# Patient Record
Sex: MALE | Race: WHITE | Employment: FULL TIME | URBAN - METROPOLITAN AREA
[De-identification: names, ages, dates, MRNs, and addresses within clinical notes are randomized per-mention and may not be internally consistent; named-entity substitution may affect disease eponyms.]

---

## 2019-08-11 ENCOUNTER — IP HISTORICAL/CONVERTED ENCOUNTER (OUTPATIENT)
Dept: OTHER | Age: 59
End: 2019-08-11

## 2020-11-02 ENCOUNTER — APPOINTMENT (OUTPATIENT)
Dept: NON INVASIVE DIAGNOSTICS | Age: 60
DRG: 201 | End: 2020-11-02
Attending: HOSPITALIST
Payer: MEDICAID

## 2020-11-02 ENCOUNTER — HOSPITAL ENCOUNTER (INPATIENT)
Age: 60
LOS: 3 days | Discharge: HOME | DRG: 201 | End: 2020-11-05
Attending: EMERGENCY MEDICINE | Admitting: HOSPITALIST
Payer: MEDICAID

## 2020-11-02 ENCOUNTER — APPOINTMENT (OUTPATIENT)
Dept: GENERAL RADIOLOGY | Age: 60
DRG: 201 | End: 2020-11-02
Attending: EMERGENCY MEDICINE
Payer: MEDICAID

## 2020-11-02 DIAGNOSIS — I48.91 ATRIAL FIBRILLATION WITH RVR (HCC): Primary | ICD-10-CM

## 2020-11-02 PROBLEM — Z72.0 TOBACCO ABUSE: Status: ACTIVE | Noted: 2020-11-02

## 2020-11-02 LAB
ALBUMIN SERPL-MCNC: 4 G/DL (ref 3.5–5)
ALBUMIN/GLOB SERPL: 1.1 {RATIO} (ref 1.1–2.2)
ALP SERPL-CCNC: 94 U/L (ref 45–117)
ALT SERPL-CCNC: 18 U/L (ref 12–78)
ANION GAP SERPL CALC-SCNC: 4 MMOL/L (ref 5–15)
APTT PPP: 31.1 SEC (ref 23–35.7)
AST SERPL W P-5'-P-CCNC: 15 U/L (ref 15–37)
ATRIAL RATE: 144 BPM
BASOPHILS # BLD: 0 K/UL (ref 0–0.1)
BASOPHILS NFR BLD: 0 % (ref 0–1)
BILIRUB SERPL-MCNC: 0.8 MG/DL (ref 0.2–1)
BUN SERPL-MCNC: 13 MG/DL (ref 6–20)
BUN/CREAT SERPL: 11 (ref 12–20)
CA-I BLD-MCNC: 9.3 MG/DL (ref 8.5–10.1)
CALCULATED R AXIS, ECG10: 80 DEGREES
CALCULATED T AXIS, ECG11: 41 DEGREES
CHLORIDE SERPL-SCNC: 106 MMOL/L (ref 97–108)
CK SERPL-CCNC: 155 NG/ML (ref 39–308)
CO2 SERPL-SCNC: 27 MMOL/L (ref 21–32)
CREAT SERPL-MCNC: 1.2 MG/DL (ref 0.7–1.3)
DIAGNOSIS, 93000: NORMAL
DIFFERENTIAL METHOD BLD: NORMAL
ECHO AV PEAK GRADIENT: 6 MMHG
ECHO EST RA PRESSURE: 8 MMHG
ECHO LV E' SEPTAL VELOCITY: 12.1 CM/S
ECHO LV EDV A2C: 120 CM3
ECHO LV EDV A4C: 135 CM3
ECHO LV EJECTION FRACTION A2C: 22 %
ECHO LV EJECTION FRACTION BIPLANE: 51.7 % (ref 55–100)
ECHO LV ESV A2C: 58 CM3
ECHO LV ESV A4C: 83 CM3
ECHO LV INTERNAL DIMENSION DIASTOLIC: 4.93 CM (ref 4.2–5.9)
ECHO LV INTERNAL DIMENSION SYSTOLIC: 3.87 CM
ECHO LV IVSD: 0.92 CM (ref 0.6–1)
ECHO LV MASS 2D: 156.8 G (ref 88–224)
ECHO LV MASS INDEX 2D: 74.4 G/M2 (ref 49–115)
ECHO LV POSTERIOR WALL DIASTOLIC: 0.9 CM (ref 0.6–1)
ECHO LVOT PEAK GRADIENT: 2 MMHG
ECHO MV A VELOCITY: 36.4 CM/S
ECHO MV E VELOCITY: 95 CM/S
ECHO MV E/A RATIO: 2.61
ECHO MV E/E' SEPTAL: 7.85
ECHO PV PEAK INSTANTANEOUS GRADIENT SYSTOLIC: 3 MMHG
ECHO PV PEAK INSTANTANEOUS GRADIENT SYSTOLIC: 6 MMHG
ECHO PV REGURGITANT MAX VELOCITY: 118 CM/S
ECHO PV REGURGITANT MAX VELOCITY: 127 CM/S
ECHO PV REGURGITANT MAX VELOCITY: 454 CM/S
ECHO PV REGURGITANT MAX VELOCITY: 70.3 CM/S
ECHO PV REGURGITANT MAX VELOCITY: 90.5 CM/S
ECHO RIGHT VENTRICULAR SYSTOLIC PRESSURE (RVSP): 35 MMHG
ECHO RV INTERNAL DIMENSION: 2.36 CM
ECHO TV MAX VELOCITY: 262 CM/S
ECHO TV REGURGITANT PEAK GRADIENT: 27 MMHG
EOSINOPHIL # BLD: 0.2 K/UL (ref 0–0.4)
EOSINOPHIL NFR BLD: 3 % (ref 0–7)
ERYTHROCYTE [DISTWIDTH] IN BLOOD BY AUTOMATED COUNT: 14.2 % (ref 11.5–14.5)
GLOBULIN SER CALC-MCNC: 3.8 G/DL (ref 2–4)
GLUCOSE SERPL-MCNC: 119 MG/DL (ref 65–100)
HCT VFR BLD AUTO: 48.5 % (ref 36.6–50.3)
HGB BLD-MCNC: 16.5 G/DL (ref 12.1–17)
IMM GRANULOCYTES # BLD AUTO: 0 K/UL (ref 0–0.04)
IMM GRANULOCYTES NFR BLD AUTO: 0 % (ref 0–0.5)
INR PPP: 1.1 (ref 0.9–1.1)
LYMPHOCYTES # BLD: 2.6 K/UL (ref 0.8–3.5)
LYMPHOCYTES NFR BLD: 29 % (ref 12–49)
MCH RBC QN AUTO: 31 PG (ref 26–34)
MCHC RBC AUTO-ENTMCNC: 34 G/DL (ref 30–36.5)
MCV RBC AUTO: 91 FL (ref 80–99)
MONOCYTES # BLD: 0.5 K/UL (ref 0–1)
MONOCYTES NFR BLD: 6 % (ref 5–13)
NEUTS SEG # BLD: 5.6 K/UL (ref 1.8–8)
NEUTS SEG NFR BLD: 62 % (ref 32–75)
PLATELET # BLD AUTO: 296 K/UL (ref 150–400)
PMV BLD AUTO: 10.2 FL (ref 8.9–12.9)
POTASSIUM SERPL-SCNC: 3.6 MMOL/L (ref 3.5–5.1)
PROT SERPL-MCNC: 7.8 G/DL (ref 6.4–8.2)
PROTHROMBIN TIME: 13.9 SEC (ref 11.9–14.7)
Q-T INTERVAL, ECG07: 318 MS
QRS DURATION, ECG06: 92 MS
QTC CALCULATION (BEZET), ECG08: 483 MS
RBC # BLD AUTO: 5.33 M/UL (ref 4.1–5.7)
SODIUM SERPL-SCNC: 137 MMOL/L (ref 136–145)
THERAPEUTIC RANGE,PTTT: NORMAL SEC (ref 68–109)
TROPONIN I SERPL-MCNC: <0.05 NG/ML
VENTRICULAR RATE, ECG03: 139 BPM
WBC # BLD AUTO: 8.9 K/UL (ref 4.1–11.1)

## 2020-11-02 PROCEDURE — 74011250637 HC RX REV CODE- 250/637: Performed by: HOSPITALIST

## 2020-11-02 PROCEDURE — 65270000029 HC RM PRIVATE

## 2020-11-02 PROCEDURE — 85025 COMPLETE CBC W/AUTO DIFF WBC: CPT

## 2020-11-02 PROCEDURE — 93306 TTE W/DOPPLER COMPLETE: CPT

## 2020-11-02 PROCEDURE — 80053 COMPREHEN METABOLIC PANEL: CPT

## 2020-11-02 PROCEDURE — 82550 ASSAY OF CK (CPK): CPT

## 2020-11-02 PROCEDURE — 96374 THER/PROPH/DIAG INJ IV PUSH: CPT

## 2020-11-02 PROCEDURE — 84484 ASSAY OF TROPONIN QUANT: CPT

## 2020-11-02 PROCEDURE — 93005 ELECTROCARDIOGRAM TRACING: CPT

## 2020-11-02 PROCEDURE — 85610 PROTHROMBIN TIME: CPT

## 2020-11-02 PROCEDURE — 85730 THROMBOPLASTIN TIME PARTIAL: CPT

## 2020-11-02 PROCEDURE — 36415 COLL VENOUS BLD VENIPUNCTURE: CPT

## 2020-11-02 PROCEDURE — 74011000250 HC RX REV CODE- 250: Performed by: EMERGENCY MEDICINE

## 2020-11-02 PROCEDURE — 99285 EMERGENCY DEPT VISIT HI MDM: CPT

## 2020-11-02 PROCEDURE — 71045 X-RAY EXAM CHEST 1 VIEW: CPT

## 2020-11-02 RX ORDER — SODIUM CHLORIDE 0.9 % (FLUSH) 0.9 %
5-40 SYRINGE (ML) INJECTION AS NEEDED
Status: DISCONTINUED | OUTPATIENT
Start: 2020-11-02 | End: 2020-11-05 | Stop reason: HOSPADM

## 2020-11-02 RX ORDER — POLYETHYLENE GLYCOL 3350 17 G/17G
17 POWDER, FOR SOLUTION ORAL DAILY PRN
Status: DISCONTINUED | OUTPATIENT
Start: 2020-11-02 | End: 2020-11-05 | Stop reason: HOSPADM

## 2020-11-02 RX ORDER — PROMETHAZINE HYDROCHLORIDE 25 MG/1
12.5 TABLET ORAL
Status: DISCONTINUED | OUTPATIENT
Start: 2020-11-02 | End: 2020-11-05 | Stop reason: HOSPADM

## 2020-11-02 RX ORDER — DILTIAZEM HCL/D5W 125 MG/125
5 PLASTIC BAG, INJECTION (ML) INTRAVENOUS CONTINUOUS
Status: DISCONTINUED | OUTPATIENT
Start: 2020-11-02 | End: 2020-11-04

## 2020-11-02 RX ORDER — FAMOTIDINE 20 MG/1
20 TABLET, FILM COATED ORAL 2 TIMES DAILY
Status: DISCONTINUED | OUTPATIENT
Start: 2020-11-02 | End: 2020-11-05 | Stop reason: HOSPADM

## 2020-11-02 RX ORDER — IBUPROFEN 200 MG
1 TABLET ORAL DAILY
Status: DISCONTINUED | OUTPATIENT
Start: 2020-11-02 | End: 2020-11-05 | Stop reason: HOSPADM

## 2020-11-02 RX ORDER — SODIUM CHLORIDE 0.9 % (FLUSH) 0.9 %
5-40 SYRINGE (ML) INJECTION EVERY 8 HOURS
Status: DISCONTINUED | OUTPATIENT
Start: 2020-11-02 | End: 2020-11-05 | Stop reason: HOSPADM

## 2020-11-02 RX ORDER — ACETAMINOPHEN 325 MG/1
650 TABLET ORAL
Status: DISCONTINUED | OUTPATIENT
Start: 2020-11-02 | End: 2020-11-05 | Stop reason: HOSPADM

## 2020-11-02 RX ORDER — ONDANSETRON 2 MG/ML
4 INJECTION INTRAMUSCULAR; INTRAVENOUS
Status: DISCONTINUED | OUTPATIENT
Start: 2020-11-02 | End: 2020-11-05 | Stop reason: HOSPADM

## 2020-11-02 RX ORDER — ACETAMINOPHEN 650 MG/1
650 SUPPOSITORY RECTAL
Status: DISCONTINUED | OUTPATIENT
Start: 2020-11-02 | End: 2020-11-05 | Stop reason: HOSPADM

## 2020-11-02 RX ORDER — DILTIAZEM HYDROCHLORIDE 5 MG/ML
20 INJECTION INTRAVENOUS
Status: COMPLETED | OUTPATIENT
Start: 2020-11-02 | End: 2020-11-02

## 2020-11-02 RX ORDER — DILTIAZEM HCL/D5W 125 MG/125
0-15 PLASTIC BAG, INJECTION (ML) INTRAVENOUS
Status: DISCONTINUED | OUTPATIENT
Start: 2020-11-02 | End: 2020-11-02

## 2020-11-02 RX ADMIN — APIXABAN 5 MG: 5 TABLET, FILM COATED ORAL at 21:45

## 2020-11-02 RX ADMIN — Medication 10 ML: at 17:31

## 2020-11-02 RX ADMIN — DILTIAZEM HYDROCHLORIDE 20 MG: 5 INJECTION INTRAVENOUS at 07:50

## 2020-11-02 RX ADMIN — APIXABAN 5 MG: 5 TABLET, FILM COATED ORAL at 11:30

## 2020-11-02 RX ADMIN — Medication 10 ML: at 21:45

## 2020-11-02 RX ADMIN — Medication 10 MG/HR: at 07:49

## 2020-11-02 RX ADMIN — FAMOTIDINE 20 MG: 20 TABLET ORAL at 11:30

## 2020-11-02 RX ADMIN — FAMOTIDINE 20 MG: 20 TABLET ORAL at 21:45

## 2020-11-02 NOTE — CONSULTS
Consult    NAME: Deejay Lay   :  1960   MRN:  [id number]     Date/Time:  2020 2:53 PM    Patient PCP: Christina Johnston MD  ________________________________________________________________________    This is an inpatient cardiology consultation requested by Dr. Milad Chairez for atrial fibrillation with rapid ventricular response. Assessment: This is a 23-year-old  man with a history of hypertension, paroxysmal atrial fibrillation and nicotine addiction who now presents with atrial fibrillation with rapid ventricular response. Patient in past has refused to take Coumadin and Xarelto for anticoagulation and has been noncompliant with medications and physician  follow-ups. Patient's cardiac enzymes in the hospital have been negative for myocardial injury. Plan:     1. Agree with IV Cardizem drip for rate control of atrial fibrillation along with Eliquis for anticoagulation. 2. Agree with echocardiogram for LV function wall motion abnormalities. 3.   Lexiscan stress Cardiolite scan to assess myocardial ischemia due to patient cardiac risk factors. This however can be done as an outpatient. 4.  I thank you for allowing me to see this patient. []        High complexity decision making was performed        Subjective:   CHIEF COMPLAINT: Atrial fibrillation with rapid ventricle response    HISTORY OF PRESENT ILLNESS:     This is a 23-year-old  man with history of hypertension, paroxysmal atrial fibrillation, noncompliance with medications and physician follow-up and nicotine addiction who a week ago post taking flu shot became febrile with feeling of palpitations and heart racing which resolved on its own. Patient however last night again had symptoms of heart racing with increasing shortness of breath and lightheadedness which persisted, making him come to emergency room.   In the emergency room patient was noted in atrial fibrillation with rapid ventricular response with a heart rate up to 140-150 range. Patient was started on IV Cardizem drip and now on IV Cardizem drip of 5 mg an hour patient's heart rate is less than 100 pulses per minute. Patient feels much better. Patient on close questioning denies any complaints of chest pain, orthopnea, PND or lower extremity edema. Past Medical History:   Diagnosis Date    A[facility]fib ([facility]     Bronchitis     Hypertension       History reviewed. No pertinent surgical history. No Known Allergies   Meds:  See below  Social History     Tobacco Use    Smoking status: Current Every Day Smoker     Packs/day: 1.00    Smokeless tobacco: Never Used   Substance Use Topics    Alcohol use: Yes     Comment: occasionally      History reviewed. No pertinent family history. REVIEW OF SYSTEMS:     []         Unable to obtain  ROS due to ---   [x]         Total of 12 systems reviewed as follows:    Constitutional: negative fever, negative chills, negative weight loss  Eyes:   negative visual changes  ENT:   negative sore throat, tongue or lip swelling  Respiratory:  Positive for smokers chronic cough, negative for wheezing   Cards: As per history of present illness  GI:   negative for nausea, vomiting, diarrhea, and abdominal pain  Genitourinary: negative for frequency, dysuria  Integument:  negative for rash   Hematologic:  negative for easy bruising and gum/nose bleeding  Musculoskel: negative for myalgias,  back pain  Neurological:  negative for headaches, dizziness, vertigo, weakness  Behavl/Psych: negative for feelings of anxiety, depression     Pertinent Positives include :    Objective:      Physical Exam:    Last 24hrs VS reviewed since prior progress note.  Most recent are:    Visit Vitals  BP (!) 135/93   Pulse 84   Temp 97.9 °F (36.6 °C)   Resp 20   Ht 5' 10.98\" (1.803 m)   Wt 90.7 kg (199 lb 15.3 oz)   SpO2 96%   BMI 27.90 kg/m²     No intake or output data in the 24 hours ending 11/02/20 1453     General Appearance: Well developed, well nourished, alert & oriented x 3,    no acute distress. Ears/Nose/Mouth/Throat: Pupils equal and round, Hearing grossly normal.  Neck: Supple. JVP within normal limits. Carotids good upstrokes, with no bruit. Chest: Lungs clear to auscultation bilaterally. Cardiovascular: Irregularly irregular, S1S2 normal, no murmur, rubs, gallops. No venous distention absent. Carotid bruits absent. Abdomen: Soft, non-tender, bowel sounds are active. No organomegaly. Extremities: No edema bilaterally. Femoral pulses +2, Distal Pulses +1. Skin: Warm and dry. Neuro: CN II-XII grossly intact, Strength and sensation grossly intact. []         Post-cath site without hematoma, bruit, tenderness, or thrill. Distal pulses intact. Data:      Telemetry:    EKG:  []  No new EKG for review. XR CHEST PORT   Final Result   Impression: No acute pulmonary process.                 Prior to Admission medications    Not on File       Recent Results (from the past 24 hour(s))   EKG, 12 LEAD, INITIAL    Collection Time: 11/02/20  6:52 AM   Result Value Ref Range    Ventricular Rate 139 BPM    Atrial Rate 144 BPM    QRS Duration 92 ms    Q-T Interval 318 ms    QTC Calculation (Bezet) 483 ms    Calculated R Axis 80 degrees    Calculated T Axis 41 degrees    Diagnosis       Atrial fibrillation with rapid ventricular response  Cannot rule out Anterior infarct , age undetermined  Abnormal ECG  When compared with ECG of 13-AUG-2019 11:33,  Atrial fibrillation has replaced Sinus rhythm  Confirmed by Benito Cantu MD, Ernesto Milligan ([id number]) on 11/2/2020 8:13:37 AM     CBC WITH AUTOMATED DIFF    Collection Time: 11/02/20  7:00 AM   Result Value Ref Range    WBC 8.9 4.1 - 11.1 K/uL    RBC 5.33 4.10 - 5.70 M/uL    HGB 16.5 12.1 - 17.0 g/dL    HCT 48.5 36.6 - 50.3 %    MCV 91.0 80.0 - 99.0 FL    MCH 31.0 26.0 - 34.0 PG    MCHC 34.0 30.0 - 36.5 g/dL    RDW 14.2 11.5 - 14.5 %    PLATELET 893 458 - 115 K/uL    MPV 10.2 8.9 - 12.9 FL    NEUTROPHILS 62 32 - 75 %    LYMPHOCYTES 29 12 - 49 %    MONOCYTES 6 5 - 13 %    EOSINOPHILS 3 0 - 7 %    BASOPHILS 0 0 - 1 %    IMMATURE GRANULOCYTES 0 0.0 - 0.5 %    ABS. NEUTROPHILS 5.6 1.8 - 8.0 K/UL    ABS. LYMPHOCYTES 2.6 0.8 - 3.5 K/UL    ABS. MONOCYTES 0.5 0.0 - 1.0 K/UL    ABS. EOSINOPHILS 0.2 0.0 - 0.4 K/UL    ABS. BASOPHILS 0.0 0.0 - 0.1 K/UL    ABS. IMM. GRANS. 0.0 0.00 - 0.04 K/UL    DF AUTOMATED     METABOLIC PANEL, COMPREHENSIVE    Collection Time: 11/02/20  7:00 AM   Result Value Ref Range    Sodium 137 136 - 145 mmol/L    Potassium 3.6 3.5 - 5.1 mmol/L    Chloride 106 97 - 108 mmol/L    CO2 27 21 - 32 mmol/L    Anion gap 4 (L) 5 - 15 mmol/L    Glucose 119 (H) 65 - 100 mg/dL    BUN 13 6 - 20 mg/dL    Creatinine 1.20 0.70 - 1.30 mg/dL    BUN/Creatinine ratio 11 (L) 12 - 20      GFR est AA >60 >60 ml/min/1.73m2    GFR est non-AA >60 >60 ml/min/1.73m2    Calcium 9.3 8.5 - 10.1 mg/dL    Bilirubin, total 0.8 0.2 - 1.0 mg/dL    AST (SGOT) 15 15 - 37 U/L    ALT (SGPT) 18 12 - 78 U/L    Alk. phosphatase 94 45 - 117 U/L    Protein, total 7.8 6.4 - 8.2 g/dL    Albumin 4.0 3.5 - 5.0 g/dL    Globulin 3.8 2.0 - 4.0 g/dL    A-G Ratio 1.1 1.1 - 2.2     TROPONIN I    Collection Time: 11/02/20  7:00 AM   Result Value Ref Range    Troponin-I, Qt. <0.05 <0.05 ng/mL   PROTHROMBIN TIME + INR    Collection Time: 11/02/20  7:25 AM   Result Value Ref Range    Prothrombin time 13.9 11.9 - 14.7 sec    INR 1.1 0.9 - 1.1     PTT    Collection Time: 11/02/20  7:25 AM   Result Value Ref Range    aPTT 31.1 23.0 - 35.7 sec    aPTT, therapeutic range   68 - 109 sec        Echo Results  (Last 48 hours)    None          Patient's  EKG and laboratory data were individually reviewed by me. Please send a copy of this dictation to above referring physician. Elina Valles MD    This dictation was done by Dragon computer voice recognition software. Occasionally grammatical, syntax and other interpretive errors escape final proof reading.  Please feel free to call me for any clarification.

## 2020-11-02 NOTE — H&P
History and Physical    Patient: Aileen Sims MRN: [id number]  SSN: [id number]    YOB: 1960  Age: 63 y.o. Sex: male      Subjective:      Chief Complaint: Palpitation and shortness of breath    HPI: Aileen Sims is a 63 y.o. male who has a history of A. Fibrillation came to ER with a complaint of shortness of breath and palpitation. Patient was found to have atrial fibrillation with rapid ventricular rate and was started on Cardizem drip. Patient states that he was diagnosed with atrial fibrillation about a year ago and was started on Xarelto and rate control medications. Patient could not afford Xarelto and stopped taking it after 3 months. He did not wanted to get anticoagulation with Coumadin as well if he had to get his blood work done for monitoring INR. Following which he completely stopped following with any cardiologist.  Today patient came to the ER with his worsening shortness of breath for last couple days and last night he started having palpitations and shortness of breath was much worse which prompted him to come to the hospital.  He denies any chest pain. Denies any fever rigors or chills. Hospitalist service was requested to admit the patient for further work-up and management. Past Medical History:   Diagnosis Date    [facility]     Bronchitis     Hypertension      History reviewed. No pertinent surgical history. History reviewed. No pertinent family history.   Social History     Tobacco Use    Smoking status: Current Every Day Smoker     Packs/day: 1.00    Smokeless tobacco: Never Used   Substance Use Topics    Alcohol use: Yes     Comment: occasionally      Prior to Admission medications    Not on File        No Known Allergies    Review of Systems:  Constitutional: No fevers, No chills, No fatigue, No weakness  Eyes: No visual disturbance  Ears, Nose, Mouth, Throat, and Face: No nasal congestion, No sore throat  Respiratory: No cough, No sputum, No wheezing,+++ SOB  Cardiovascular: No chest pain, No lower extremity edema, +++ Palpitations   Gastrointestinal: No nausea, No vomiting, No diarrhea, No constipation, No abdominal pain  Genitourinary: No frequency, No dysuria, No hematuria  Integument/Breast: No rash, No skin lesion(s), No dryness  Musculoskeletal: No arthralgias, No neck pain, No back pain  Neurological: No headaches, No dizziness, No confusion,  No seizures  Behavioral/Psychiatric: No anxiety, No depression      Objective:     Vitals:    11/02/20 0715 11/02/20 0758 11/02/20 0800 11/02/20 0918   BP:  (!) 149/103  114/85   Pulse:  (!) 115  87   Resp:  22  20   Temp:  97.9 °F (36.6 °C)     SpO2: 97% 98% 98% 96%   Weight:       Height:            Physical Exam:  General: Alert and Oriented x 3. Cooperative and friendly. No acute distress. HEAD: Normocephalic, atraumatic. Eye: PERRLA, EOMI. Throat and Neck: normal and no erythema or exudates noted. No mass   Lung: Clear to auscultation bilaterally without wheezes, rhonchi. Good air movement bilaterally. Heart:IRRegular rate and rhythm. NO appreciated murmurs, rubs or gallops. Abdomen: soft, non-tender. Bowel sounds present in all four quadrants. No masses appreciated. Extremities:  able to move all extremities normal, atraumatic  Skin: Clean, dry and intact without appreciated lesions. Neurologic: AOx3. Cranial nerves 2-12 and sensation grossly intact.   Psychiatric: non focal, normal affect, normal thought process    BMP:   Lab Results   Component Value Date/Time    Glucose 119 (H) 11/02/2020 07:00 AM    Sodium 137 11/02/2020 07:00 AM    Potassium 3.6 11/02/2020 07:00 AM    Chloride 106 11/02/2020 07:00 AM    CO2 27 11/02/2020 07:00 AM    BUN 13 11/02/2020 07:00 AM    Creatinine 1.20 11/02/2020 07:00 AM    Calcium 9.3 11/02/2020 07:00 AM     CMP:   Lab Results   Component Value Date/Time    Glucose 119 (H) 11/02/2020 07:00 AM    Sodium 137 11/02/2020 07:00 AM    Potassium 3.6 11/02/2020 07:00 AM    Chloride 106 11/02/2020 07:00 AM    CO2 27 11/02/2020 07:00 AM    BUN 13 11/02/2020 07:00 AM    Creatinine 1.20 11/02/2020 07:00 AM    Calcium 9.3 11/02/2020 07:00 AM    Anion gap 4 (L) 11/02/2020 07:00 AM    BUN/Creatinine ratio 11 (L) 11/02/2020 07:00 AM    Alk. phosphatase 94 11/02/2020 07:00 AM    Protein, total 7.8 11/02/2020 07:00 AM    Albumin 4.0 11/02/2020 07:00 AM    Globulin 3.8 11/02/2020 07:00 AM    A-G Ratio 1.1 11/02/2020 07:00 AM     Cardiac markers: No results found for: CPK, CKND1, HERNAN  ABGs: No results found for: PH, PO2, PCO2, HCO3, BD, BE  Radiology review:   XR CHEST PORT   Final Result   Impression: No acute pulmonary process.                Assessment:     Active Problems:    Atrial fibrillation with RVR ([address] (11/2/2020)      Tobacco abuse (11/2/2020)         Plan:     Admit to telemetry  Continue Cardizem drip  Start Eliquis  Discussed with cardiology- further workup as per cardiology  Check echo    Signed By: Pastor Carlie MD     November 2, 2020

## 2020-11-02 NOTE — LETTER
NOTIFICATION RETURN TO WORK / SCHOOL 
 
11/5/2020 Mr. Kimmy Gómez [address] [facility] [place] [address] To Whom It May Concern: 
 
Kimmy Gómez is currently under the care of [address] He was admitted to [facility] from 11/2/2020 to 11/5/2020. He will return to work/school on: 11/9/2020 If there are questions or concerns please have the patient contact our office. Sincerely, John Rush MD

## 2020-11-02 NOTE — ROUTINE PROCESS
TRANSFER - OUT REPORT: 
 
Verbal report given to Balbina(name) on Cinthia Batista  being transferred to 4(unit) for routine progression of care Report consisted of patients Situation, Background, Assessment and  
Recommendations(SBAR). Information from the following report(s) SBAR and ED Summary was reviewed with the receiving nurse. Lines:  
Peripheral IV 11/02/20 Left Antecubital (Active) Opportunity for questions and clarification was provided. Patient transported with: 
 Monitor Tech

## 2020-11-02 NOTE — ED TRIAGE NOTES
States the last 2 days has been sob worse when he is lying down. States some left arm uncomfortable feeling.  Has not been on bp meds or anticoags in months

## 2020-11-02 NOTE — ED PROVIDER NOTES
EMERGENCY DEPARTMENT HISTORY AND PHYSICAL EXAM        Date: 11/2/2020  Patient Name: Mere Palacios    History of Presenting Illness     Chief Complaint   Patient presents with    Chest Pain    Shortness of Breath       History Provided By: Patient    HPI: Mere Palacios, 63 y.o. male with past medical history of atrial fibrillation hypertension who presents with 2 days of shortness of breath. Symptoms were worse last night. States he has worse dyspnea with lying on his back. He also has worse dyspnea with exertion. He has some discomfort in his left arm but denies any chest pain. He does have history of atrial fibrillation and states he has not been taking his medications for the last 6 months including his anticoagulants and rate control medications. PCP: Jon Kraft MD        Past History     Past Medical History:  Past Medical History:   Diagnosis Date    [facility]     Bronchitis     Hypertension        Past Surgical History:  History reviewed. No pertinent surgical history. Family History:  History reviewed. No pertinent family history. Social History:  Social History     Tobacco Use    Smoking status: Current Every Day Smoker     Packs/day: 1.00    Smokeless tobacco: Never Used   Substance Use Topics    Alcohol use: Yes     Comment: occasionally    Drug use: Never       Allergies:  No Known Allergies    Review of Systems   Review of Systems   Constitutional: Negative for fever. HENT: Negative for congestion. Eyes: Negative for visual disturbance. Respiratory: Positive for shortness of breath. Negative for chest tightness. Cardiovascular: Negative for chest pain. Gastrointestinal: Negative for abdominal pain. Genitourinary: Negative for dysuria. Musculoskeletal: Negative for arthralgias. Skin: Negative for rash. Neurological: Negative for headaches. Physical Exam   General: No acute distressed. Well-nourished. Skin: No rash. Head: Normocephalic. Atraumatic. Eye: No proptosis or conjunctival injections. Respiratory: No apparent respiratory distress. Gastrointestinal: Nondistended. Musculoskeletal: No obvious bony deformities. Psychiatric: Cooperative. Appropriate mood and affect. Diagnostic Study Results     Labs -     Recent Results (from the past 24 hour(s))   EKG, 12 LEAD, INITIAL    Collection Time: 11/02/20  6:52 AM   Result Value Ref Range    Ventricular Rate 139 BPM    Atrial Rate 144 BPM    QRS Duration 92 ms    Q-T Interval 318 ms    QTC Calculation (Bezet) 483 ms    Calculated R Axis 80 degrees    Calculated T Axis 41 degrees    Diagnosis       Atrial fibrillation with rapid ventricular response  Cannot rule out Anterior infarct , age undetermined  Abnormal ECG  When compared with ECG of 13-AUG-2019 11:33,  Atrial fibrillation has replaced Sinus rhythm  Confirmed by Katherine Mullen MD, Angela Brownlee ([id number]) on 11/2/2020 8:13:37 AM     CBC WITH AUTOMATED DIFF    Collection Time: 11/02/20  7:00 AM   Result Value Ref Range    WBC 8.9 4.1 - 11.1 K/uL    RBC 5.33 4.10 - 5.70 M/uL    HGB 16.5 12.1 - 17.0 g/dL    HCT 48.5 36.6 - 50.3 %    MCV 91.0 80.0 - 99.0 FL    MCH 31.0 26.0 - 34.0 PG    MCHC 34.0 30.0 - 36.5 g/dL    RDW 14.2 11.5 - 14.5 %    PLATELET 135 376 - 135 K/uL    MPV 10.2 8.9 - 12.9 FL    NEUTROPHILS 62 32 - 75 %    LYMPHOCYTES 29 12 - 49 %    MONOCYTES 6 5 - 13 %    EOSINOPHILS 3 0 - 7 %    BASOPHILS 0 0 - 1 %    IMMATURE GRANULOCYTES 0 0.0 - 0.5 %    ABS. NEUTROPHILS 5.6 1.8 - 8.0 K/UL    ABS. LYMPHOCYTES 2.6 0.8 - 3.5 K/UL    ABS. MONOCYTES 0.5 0.0 - 1.0 K/UL    ABS. EOSINOPHILS 0.2 0.0 - 0.4 K/UL    ABS. BASOPHILS 0.0 0.0 - 0.1 K/UL    ABS. IMM.  GRANS. 0.0 0.00 - 0.04 K/UL    DF AUTOMATED     METABOLIC PANEL, COMPREHENSIVE    Collection Time: 11/02/20  7:00 AM   Result Value Ref Range    Sodium 137 136 - 145 mmol/L    Potassium 3.6 3.5 - 5.1 mmol/L    Chloride 106 97 - 108 mmol/L    CO2 27 21 - 32 mmol/L    Anion gap 4 (L) 5 - 15 mmol/L Glucose 119 (H) 65 - 100 mg/dL    BUN 13 6 - 20 mg/dL    Creatinine 1.20 0.70 - 1.30 mg/dL    BUN/Creatinine ratio 11 (L) 12 - 20      GFR est AA >60 >60 ml/min/1.73m2    GFR est non-AA >60 >60 ml/min/1.73m2    Calcium 9.3 8.5 - 10.1 mg/dL    Bilirubin, total 0.8 0.2 - 1.0 mg/dL    AST (SGOT) 15 15 - 37 U/L    ALT (SGPT) 18 12 - 78 U/L    Alk. phosphatase 94 45 - 117 U/L    Protein, total 7.8 6.4 - 8.2 g/dL    Albumin 4.0 3.5 - 5.0 g/dL    Globulin 3.8 2.0 - 4.0 g/dL    A-G Ratio 1.1 1.1 - 2.2     TROPONIN I    Collection Time: 11/02/20  7:00 AM   Result Value Ref Range    Troponin-I, Qt. <0.05 <0.05 ng/mL   PROTHROMBIN TIME + INR    Collection Time: 11/02/20  7:25 AM   Result Value Ref Range    Prothrombin time 13.9 11.9 - 14.7 sec    INR 1.1 0.9 - 1.1     PTT    Collection Time: 11/02/20  7:25 AM   Result Value Ref Range    aPTT 31.1 23.0 - 35.7 sec    aPTT, therapeutic range   68 - 109 sec       Radiologic Studies -   XR CHEST PORT   Final Result   Impression: No acute pulmonary process. CT Results  (Last 48 hours)    None        CXR Results  (Last 48 hours)               11/02/20 0724  XR CHEST PORT Final result    Impression:  Impression: No acute pulmonary process. Narrative:  Chest, frontal view, 11/2/2020       History: Chest pain. Comparison: Including chest 11/20/2019. Findings: The cardiac silhouette is within normal limits. The lungs are   adequately expanded. No hydrostatic edema is present. No focal consolidation,   pleural effusions or pneumothorax is identified. Degenerative changes are   present in the thoracic spine. Medical Decision Making and ED Course     I reviewed the available vital signs, nursing notes, past medical history, past surgical history, family history, and social history. Vital Signs - Reviewed the patient's vital signs.   Patient Vitals for the past 12 hrs:   Temp Pulse Resp BP SpO2   11/02/20 0800     98 %   11/02/20 0758 97.9 °F (36.6 °C) (!) 115 22 (!) 149/103 98 %   11/02/20 0715     97 %   11/02/20 0712  (!) 147 30 (!) 142/114    11/02/20 0655 97.9 °F (36.6 °C) (!) 107 22  97 %       EKG interpretation: Atrial fibrillation with RVR at 139 bpm.  NE interval is indistinguishable. Normal QRS duration. QTC is 483 ms. No significant ST segment abnormalities. Normal axis. Medical Decision Making:   Presented with dyspnea. The  differential diagnosis is ACS, atrial fibrillation with RVR, CHF, pneumonia. Work-up is negative except for EKG. He does have elevated heart rate with atrial fibrillation on EKG. Placed on diltiazem. Did give him a bolus as well as started him on a drip. Patient has been noncompliant with his medications which is likely cause of his presentation. No significant concern for pulmonary embolism or other severe cause of his symptoms at this time. Will admit for further care. Procedures       Critical Care Note:   7:07 AM  Amount of critical care time: 30 minutes  Impending deterioration: Cardiovascular  Associated risk factors: Hypotension and Dysrhythmia  Management: Bedside Assessment and Supervision of Care  Interpretation: ECG and Blood Pressure  Interventions: diltiazem infusion and bolus  Case review: hospitalist, nursing  Treatment response: improving  Performed by: Self  Notes: I have spent critical care time involved in lab review, decision making, bedside attention, and documentation. This time excludes time spent in any separate billed procedures. During this entire length of time I was immediately available to the patient. Chadwick Anderson DO    Disposition     Admitted        Diagnosis     Clinical impression:   1. Atrial fibrillation with RVR ([address]           Attestation:  Please note that this dictation was completed with [employer], the [employer] voice recognition software.  Quite often unanticipated grammatical, syntax, homophones, and other interpretive errors are inadvertently transcribed by the computer software. Please disregard these errors. Please excuse any errors that have escaped final proofreading. Thank you.   Suzie Donnelly, DO

## 2020-11-03 LAB
ALBUMIN SERPL-MCNC: 3.9 G/DL (ref 3.5–5)
ALBUMIN/GLOB SERPL: 1.2 {RATIO} (ref 1.1–2.2)
ALP SERPL-CCNC: 83 U/L (ref 45–117)
ALT SERPL-CCNC: 15 U/L (ref 12–78)
ANION GAP SERPL CALC-SCNC: 7 MMOL/L (ref 5–15)
AST SERPL W P-5'-P-CCNC: 12 U/L (ref 15–37)
BASOPHILS # BLD: 0 K/UL (ref 0–0.1)
BASOPHILS NFR BLD: 0 % (ref 0–1)
BILIRUB SERPL-MCNC: 0.7 MG/DL (ref 0.2–1)
BNP SERPL-MCNC: 1549 PG/ML
BUN SERPL-MCNC: 18 MG/DL (ref 6–20)
BUN/CREAT SERPL: 16 (ref 12–20)
CA-I BLD-MCNC: 9.2 MG/DL (ref 8.5–10.1)
CHLORIDE SERPL-SCNC: 108 MMOL/L (ref 97–108)
CO2 SERPL-SCNC: 25 MMOL/L (ref 21–32)
CREAT SERPL-MCNC: 1.11 MG/DL (ref 0.7–1.3)
DIFFERENTIAL METHOD BLD: NORMAL
EOSINOPHIL # BLD: 0.3 K/UL (ref 0–0.4)
EOSINOPHIL NFR BLD: 3 % (ref 0–7)
ERYTHROCYTE [DISTWIDTH] IN BLOOD BY AUTOMATED COUNT: 14.2 % (ref 11.5–14.5)
GLOBULIN SER CALC-MCNC: 3.3 G/DL (ref 2–4)
GLUCOSE SERPL-MCNC: 88 MG/DL (ref 65–100)
HCT VFR BLD AUTO: 46.8 % (ref 36.6–50.3)
HGB BLD-MCNC: 15.7 G/DL (ref 12.1–17)
IMM GRANULOCYTES # BLD AUTO: 0 K/UL (ref 0–0.04)
IMM GRANULOCYTES NFR BLD AUTO: 0 % (ref 0–0.5)
LYMPHOCYTES # BLD: 3.1 K/UL (ref 0.8–3.5)
LYMPHOCYTES NFR BLD: 33 % (ref 12–49)
MCH RBC QN AUTO: 30.7 PG (ref 26–34)
MCHC RBC AUTO-ENTMCNC: 33.5 G/DL (ref 30–36.5)
MCV RBC AUTO: 91.4 FL (ref 80–99)
MONOCYTES # BLD: 0.7 K/UL (ref 0–1)
MONOCYTES NFR BLD: 7 % (ref 5–13)
NEUTS SEG # BLD: 5.3 K/UL (ref 1.8–8)
NEUTS SEG NFR BLD: 57 % (ref 32–75)
PLATELET # BLD AUTO: 266 K/UL (ref 150–400)
PMV BLD AUTO: 10.7 FL (ref 8.9–12.9)
POTASSIUM SERPL-SCNC: 3.9 MMOL/L (ref 3.5–5.1)
PROT SERPL-MCNC: 7.2 G/DL (ref 6.4–8.2)
RBC # BLD AUTO: 5.12 M/UL (ref 4.1–5.7)
SODIUM SERPL-SCNC: 140 MMOL/L (ref 136–145)
WBC # BLD AUTO: 9.3 K/UL (ref 4.1–11.1)

## 2020-11-03 PROCEDURE — 74011250637 HC RX REV CODE- 250/637: Performed by: INTERNAL MEDICINE

## 2020-11-03 PROCEDURE — 85025 COMPLETE CBC W/AUTO DIFF WBC: CPT

## 2020-11-03 PROCEDURE — 74011250637 HC RX REV CODE- 250/637: Performed by: HOSPITALIST

## 2020-11-03 PROCEDURE — 74011000250 HC RX REV CODE- 250: Performed by: HOSPITALIST

## 2020-11-03 PROCEDURE — 65270000029 HC RM PRIVATE

## 2020-11-03 PROCEDURE — 36415 COLL VENOUS BLD VENIPUNCTURE: CPT

## 2020-11-03 PROCEDURE — 83880 ASSAY OF NATRIURETIC PEPTIDE: CPT

## 2020-11-03 PROCEDURE — 80053 COMPREHEN METABOLIC PANEL: CPT

## 2020-11-03 RX ORDER — DILTIAZEM HYDROCHLORIDE 120 MG/1
120 CAPSULE, COATED, EXTENDED RELEASE ORAL DAILY
Status: DISCONTINUED | OUTPATIENT
Start: 2020-11-03 | End: 2020-11-04

## 2020-11-03 RX ORDER — METOPROLOL TARTRATE 25 MG/1
25 TABLET, FILM COATED ORAL EVERY 12 HOURS
Status: DISCONTINUED | OUTPATIENT
Start: 2020-11-03 | End: 2020-11-05 | Stop reason: HOSPADM

## 2020-11-03 RX ADMIN — METOPROLOL TARTRATE 25 MG: 25 TABLET, FILM COATED ORAL at 17:26

## 2020-11-03 RX ADMIN — APIXABAN 5 MG: 5 TABLET, FILM COATED ORAL at 08:40

## 2020-11-03 RX ADMIN — APIXABAN 5 MG: 5 TABLET, FILM COATED ORAL at 22:27

## 2020-11-03 RX ADMIN — Medication 5 MG/HR: at 12:37

## 2020-11-03 RX ADMIN — Medication 10 ML: at 06:48

## 2020-11-03 RX ADMIN — METOPROLOL TARTRATE 25 MG: 25 TABLET, FILM COATED ORAL at 22:27

## 2020-11-03 RX ADMIN — ACETAMINOPHEN 650 MG: 325 TABLET, FILM COATED ORAL at 22:47

## 2020-11-03 RX ADMIN — FAMOTIDINE 20 MG: 20 TABLET ORAL at 22:27

## 2020-11-03 RX ADMIN — FAMOTIDINE 20 MG: 20 TABLET ORAL at 08:39

## 2020-11-03 RX ADMIN — Medication 10 ML: at 22:33

## 2020-11-03 RX ADMIN — DILTIAZEM HYDROCHLORIDE 120 MG: 120 CAPSULE, COATED, EXTENDED RELEASE ORAL at 11:41

## 2020-11-03 NOTE — PROGRESS NOTES
Progress Note      11/3/2020 1:09 PM  NAME: Latrice Cruz   MRN:  [id number]   Admit Diagnosis: Atrial fibrillation with RVR ([address] [I48.91]      Problem List:     1. Atrial fibrillation with rapid ventricular response. 2.  Hypertension     Assessment/Plan:   1. Patient on telemetry still experiencing atrial fibrillation with rapid ventricular response with heart rate up to 150 beats per minute with activity. I will go up on IV Cardizem drip dose and at beta-blocker to optimize rate control. Patient will continue Eliquis for anticoagulation. 2.   Hypertension, stable. 3.   Cardiomyopathy, LV ejection fraction 40-45%. No clinical evidence of congestive heart failure. I will check BNP. 3.  Lexiscan stress Cardiolite scan tomorrow  Due to patient's  Cardiomyopathy and cardiac risk factors. []       High complexity decision making was performed in this patient at high risk for decompensation with multiple organ involvement. Subjective:     Latrice Cruz denies chest pain, dyspnea. Discussed with RN events overnight. Review of Systems:    Symptom Y/N Comments  Symptom Y/N Comments   Fever/Chills N   Chest Pain N    Poor Appetite N   Edema N    Cough N   Abdominal Pain N    Sputum N   Joint Pain N    SOB/GONZALES N   Pruritis/Rash N    Nausea/vomit N   Tolerating PT/OT Y    Diarrhea N   Tolerating Diet Y    Constipation N   Other       Could NOT obtain due to:      Objective:      Physical Exam:    Last 24hrs VS reviewed since prior progress note. Most recent are:    Visit Vitals  /75   Pulse 82   Temp 98.2 °F (36.8 °C)   Resp 18   Ht 5' 10.98\" (1.803 m)   Wt 90.7 kg (199 lb 15.3 oz)   SpO2 96%   BMI 27.90 kg/m²     No intake or output data in the 24 hours ending 11/03/20 1309     General Appearance: Well developed, well nourished, alert & oriented x 3,    no acute distress. Ears/Nose/Mouth/Throat: Hearing grossly normal.  Neck: Supple.   Chest: Lungs clear to auscultation bilaterally. Cardiovascular: Regular rate and rhythm, S1S2 normal, no murmur. Abdomen: Soft, non-tender, bowel sounds are active. Extremities: No edema bilaterally. Skin: Warm and dry. []         Post-cath site without hematoma, bruit, tenderness, or thrill. Distal pulses intact. PMH/SH reviewed - no change compared to H&P    Data Review      Echocardiogram, 11/02/2020:  · LV: Estimated LVEF is 40 - 45%. Visually measured ejection fraction. Normal wall thickness. Mildly dilated left ventricle. Moderately and globally reduced systolic function. · LA: Mildly dilated left atrium. · AV: Cannot rule out bicuspid aortic valve. · MV: Mitral valve thickening. Mild to moderate mitral valve regurgitation is present. · TV: Moderate tricuspid valve regurgitation is present. · IVC: Severely elevated central venous pressure (15+ mmHg); IVC diameter is larger than 21 mm and collapses less than 50% with respiration. EKG:   []  No new EKG for review    XR CHEST PORT   Final Result   Impression: No acute pulmonary process.               Recent Results (from the past 24 hour(s))   ECHO ADULT COMPLETE    Collection Time: 11/02/20  2:47 PM   Result Value Ref Range    LV ED Vol A4C 135.00 cm3    LV ED Vol A2C 120.00 cm3    LV ES Vol A4C 83.00 cm3    LV ES Vol A2C 58.00 cm3    IVSd 0.92 0.6 - 1.0 cm    LVIDd 4.93 4.2 - 5.9 cm    LVIDs 3.87 cm    Pulmonic Regurgitant End Max Velocity 70.30 cm/s    LVOT Peak Gradient 2.00 mmHg    LVPWd 0.90 0.6 - 1.0 cm    LV E' Septal Velocity 12.10 cm/s    BP EF 51.7 (A) 55 - 100 %    E/E' septal 7.85     LV Ejection Fraction MOD 2C 22 %    Pulmonic Regurgitant End Max Velocity 118.00 cm/s    AoV PG 6.00 mmHg    Pulmonic Regurgitant End Max Velocity 454.00 cm/s    MV A Angus 36.40 cm/s    MV E Angus 95.00 cm/s    MV E/A 2.61     Pulmonic Regurgitant End Max Velocity 127.00 cm/s    Pulmonic Valve Systolic Peak Instantaneous Gradient 6.00 mmHg    Pulmonic Regurgitant End Max Velocity 90.50 cm/s    Pulmonic Valve Systolic Peak Instantaneous Gradient 3.00 mmHg    Est. RA Pressure 8.00 mmHg    RVIDd 2.36 cm    RVSP 35.00 mmHg    Tricuspid Valve Max Velocity 262.00 cm/s    Triscuspid Valve Regurgitation Peak Gradient 27.00 mmHg    LV Mass .8 88 - 224 g    LV Mass AL Index 74.4 49 - 807 g/m2   METABOLIC PANEL, COMPREHENSIVE    Collection Time: 11/03/20  2:42 AM   Result Value Ref Range    Sodium 140 136 - 145 mmol/L    Potassium 3.9 3.5 - 5.1 mmol/L    Chloride 108 97 - 108 mmol/L    CO2 25 21 - 32 mmol/L    Anion gap 7 5 - 15 mmol/L    Glucose 88 65 - 100 mg/dL    BUN 18 6 - 20 mg/dL    Creatinine 1.11 0.70 - 1.30 mg/dL    BUN/Creatinine ratio 16 12 - 20      GFR est AA >60 >60 ml/min/1.73m2    GFR est non-AA >60 >60 ml/min/1.73m2    Calcium 9.2 8.5 - 10.1 mg/dL    Bilirubin, total 0.7 0.2 - 1.0 mg/dL    AST (SGOT) 12 (L) 15 - 37 U/L    ALT (SGPT) 15 12 - 78 U/L    Alk. phosphatase 83 45 - 117 U/L    Protein, total 7.2 6.4 - 8.2 g/dL    Albumin 3.9 3.5 - 5.0 g/dL    Globulin 3.3 2.0 - 4.0 g/dL    A-G Ratio 1.2 1.1 - 2.2     CBC WITH AUTOMATED DIFF    Collection Time: 11/03/20  2:42 AM   Result Value Ref Range    WBC 9.3 4.1 - 11.1 K/uL    RBC 5.12 4.10 - 5.70 M/uL    HGB 15.7 12.1 - 17.0 g/dL    HCT 46.8 36.6 - 50.3 %    MCV 91.4 80.0 - 99.0 FL    MCH 30.7 26.0 - 34.0 PG    MCHC 33.5 30.0 - 36.5 g/dL    RDW 14.2 11.5 - 14.5 %    PLATELET 233 933 - 967 K/uL    MPV 10.7 8.9 - 12.9 FL    NEUTROPHILS 57 32 - 75 %    LYMPHOCYTES 33 12 - 49 %    MONOCYTES 7 5 - 13 %    EOSINOPHILS 3 0 - 7 %    BASOPHILS 0 0 - 1 %    IMMATURE GRANULOCYTES 0 0.0 - 0.5 %    ABS. NEUTROPHILS 5.3 1.8 - 8.0 K/UL    ABS. LYMPHOCYTES 3.1 0.8 - 3.5 K/UL    ABS. MONOCYTES 0.7 0.0 - 1.0 K/UL    ABS. EOSINOPHILS 0.3 0.0 - 0.4 K/UL    ABS. BASOPHILS 0.0 0.0 - 0.1 K/UL    ABS. IMM.  GRANS. 0.0 0.00 - 0.04 K/UL    DF AUTOMATED          Echo Results  (Last 48 hours)    None          Patient's  EKG, laboratory data and echocardiogram were individually reviewed by me. Lab Data:    Recent Labs     11/03/20 0242 11/02/20  0700   WBC 9.3 8.9   HGB 15.7 16.5   HCT 46.8 48.5    296     Recent Labs     11/02/20  0725   INR 1.1   PTP 13.9   APTT 31.1      Recent Labs     11/03/20 0242 11/02/20  0700    137   K 3.9 3.6    106   CO2 25 27   BUN 18 13   CREA 1.11 1.20   GLU 88 119*   CA 9.2 9.3     Recent Labs     11/02/20  0700   TROIQ <0.05     No results found for: CHOL, CHOLX, CHLST, CHOLV, HDL, HDLP, LDL, LDLC, DLDLP, TGLX, TRIGL, TRIGP, CHHD, CHHDX    Recent Labs     11/03/20 0242 11/02/20  0700   AP 83 94   TP 7.2 7.8   ALB 3.9 4.0   GLOB 3.3 3.8     No results for input(s): PH, PCO2, PO2 in the last 72 hours.     Medications Personally Reviewed:    Current Facility-Administered Medications   Medication Dose Route Frequency    dilTIAZem ER (CARDIZEM CD) capsule 120 mg  120 mg Oral DAILY    apixaban (ELIQUIS) tablet 5 mg  5 mg Oral BID    sodium chloride (NS) flush 5-40 mL  5-40 mL IntraVENous Q8H    sodium chloride (NS) flush 5-40 mL  5-40 mL IntraVENous PRN    acetaminophen (TYLENOL) tablet 650 mg  650 mg Oral Q6H PRN    Or    acetaminophen (TYLENOL) suppository 650 mg  650 mg Rectal Q6H PRN    polyethylene glycol (MIRALAX) packet 17 g  17 g Oral DAILY PRN    promethazine (PHENERGAN) tablet 12.5 mg  12.5 mg Oral Q6H PRN    Or    ondansetron (ZOFRAN) injection 4 mg  4 mg IntraVENous Q6H PRN    nicotine (NICODERM CQ) 21 mg/24 hr patch 1 Patch  1 Patch TransDERmal DAILY    famotidine (PEPCID) tablet 20 mg  20 mg Oral BID    dilTIAZem (CARDIZEM) 125 mg/125 mL (1 mg/mL) dextrose 5% infusion  5 mg/hr IntraVENous CONTINUOUS         Prior to Admission medications    Not on File      clinical care time spent 30 minutes      Rich Amaya MD

## 2020-11-03 NOTE — PROGRESS NOTES
Hospitalist Progress Note    NAME: Melissa Rivera   :  1960   MRN:  [id number]           Subjective:     Chief Complaint / Reason for Physician Visit  Patient seen and examined at bedside, of note patient feels significantly better, no acute events overnight, patient is currently still in atrial fibrillation with RVR. Discussed with RN events overnight. Review of Systems:  Symptom Y/N Comments  Symptom Y/N Comments   Fever/Chills N   Chest Pain N    Poor Appetite N   Edema N    Cough N   Abdominal Pain N    Sputum N   Joint Pain N    SOB/GONZALES Y   Pruritis/Rash N    Nausea/vomit N   Tolerating PT/OT NA    Diarrhea N   Tolerating Diet Y     Constipation N   Other       Could NOT obtain due to:    patient denies any fevers chills nausea vomiting lightheadedness dizziness orthopnea paroxysmal nocturnal dyspnea, chest pain headache focal weakness loss of sensation auditory or visual symptoms abdominal stool or urinary complaints or any other associated symptoms. Objective:     VITALS:   Last 24hrs VS reviewed since prior progress note. Most recent are:  Patient Vitals for the past 24 hrs:   Temp Pulse Resp BP SpO2   20 1532 97.9 °F (36.6 °C) (!) 110 18 134/79 97 %   20 1200  82      20 1125 98.2 °F (36.8 °C) 82 18 127/75 96 %   20 0800  82      20 0741 98 °F (36.7 °C) 76 18 131/78 97 %   20 0400  71      20 0000  71      20 2353 97.8 °F (36.6 °C) 79 20 123/86 96 %   20 2023 97.8 °F (36.6 °C) 97 20 (!) 146/99 98 %   20 2000  79      20 1757 97.4 °F (36.3 °C) 83 20 (!) 146/95 98 %     No intake or output data in the 24 hours ending 20 1719     PHYSICAL EXAM:  General: WD, WN. Alert, cooperative, no acute distress    EENT:  EOMI. Anicteric sclerae. MMM  Resp:  CTA bilaterally, no wheezing or rales.   No accessory muscle use  CV:  Irregularly irregular, tachycardic, no murmurs rubs or gallops,  No edema  GI:  Soft, Non distended, Non tender. +Bowel sounds  Neurologic:  Alert and oriented X 3, normal speech,   Psych:   Good insight. Not anxious nor agitated  Skin:  No rashes. No jaundice    Reviewed most current lab test results and cultures  YES  Reviewed most current radiology test results   YES  Review and summation of old records today    NO  Reviewed patient's current orders and MAR    YES  PMH/SH reviewed - no change compared to H&P    Procedures: see electronic medical records for all procedures/Xrays and details which were not copied into this note but were reviewed prior to creation of Plan. LABS:  I reviewed today's most current labs and imaging studies. Pertinent labs include:  Recent Labs     11/03/20 0242 11/02/20  0700   WBC 9.3 8.9   HGB 15.7 16.5   HCT 46.8 48.5    296     Recent Labs     11/03/20 0242 11/02/20  0725 11/02/20  0700     --  137   K 3.9  --  3.6     --  106   CO2 25  --  27   GLU 88  --  119*   BUN 18  --  13   CREA 1.11  --  1.20   CA 9.2  --  9.3   ALB 3.9  --  4.0   TBILI 0.7  --  0.8   ALT 15  --  18   INR  --  1.1  --        Signed: Yane Tracey MD      Assessment / Plan:  Atrial fibrillation with RVRpatient presented with above-mentioned symptomatology and was found to be in atrial fibrillation with RVR, of note patient is still on Cardizem gtt. at this time  Patient's transthoracic echo is consistent with an EF of 40 to 45%  Continue Cardizem GTT, initiate p.o. Cardizem, initiate Lopressor twice daily  Attempt to wean off of Cardizem gtt.   Continue Eliquis twice daily  Cardiology consult appreciated, will continue to follow    Hypokalemiacurrently resolved    Acute kidney injurylikely multifactorial, serum creatinine currently downtrending  Continue to trend serum creatinine    Dyspepsiacontinue Pepcid    Prophylaxispatient is on Eliquis  FENcardiac diet, replete potassium and magnesium  Full code  Dispositionpending clinical improvement, likely home tomorrow    18.5 - 24.9 Normal weight / Body mass index is 27.9 kg/m². Code status: Full  Prophylaxis: Eliquis  Recommended Disposition: Home w/Family     ________________________________________________________________________  Care Plan discussed with:    Comments   Patient X    Family      RN X    Care Manager X    Consultant  X                     X Multidiciplinary team rounds were held today with , nursing, pharmacist and clinical coordinator. Patient's plan of care was discussed; medications were reviewed and discharge planning was addressed.      ________________________________________________________________________  Total NON critical care TIME:  35   Minutes      Comments   >50% of visit spent in counseling and coordination of care X    ________________________________________________________________________  Harding Kussmaul, MD

## 2020-11-03 NOTE — PROGRESS NOTES
Bedside shift change report given to Giovani Stewart RN(oncoming nurse) by Genaro Ridley RN (offgoing nurse). Report included the following information SBAR, Kardex, Intake/Output, MAR, Accordion, Recent Results, Med Rec Status and Cardiac Rhythm Afib.     1936: Bedside and Verbal shift change report given to CHEYENNE Vivas (oncoming nurse) by Alida Ly RN (offgoing nurse). Report included the following information SBAR, Kardex, Intake/Output, MAR, Accordion, Recent Results, Med Rec Status and Cardiac Rhythm Afib.

## 2020-11-04 ENCOUNTER — APPOINTMENT (OUTPATIENT)
Dept: NON INVASIVE DIAGNOSTICS | Age: 60
DRG: 201 | End: 2020-11-04
Attending: INTERNAL MEDICINE
Payer: MEDICAID

## 2020-11-04 ENCOUNTER — APPOINTMENT (OUTPATIENT)
Dept: NUCLEAR MEDICINE | Age: 60
DRG: 201 | End: 2020-11-04
Attending: INTERNAL MEDICINE
Payer: MEDICAID

## 2020-11-04 LAB
ANION GAP SERPL CALC-SCNC: 6 MMOL/L (ref 5–15)
BUN SERPL-MCNC: 17 MG/DL (ref 6–20)
BUN/CREAT SERPL: 13 (ref 12–20)
CA-I BLD-MCNC: 9.7 MG/DL (ref 8.5–10.1)
CHLORIDE SERPL-SCNC: 105 MMOL/L (ref 97–108)
CO2 SERPL-SCNC: 26 MMOL/L (ref 21–32)
CREAT SERPL-MCNC: 1.29 MG/DL (ref 0.7–1.3)
ERYTHROCYTE [DISTWIDTH] IN BLOOD BY AUTOMATED COUNT: 14.4 % (ref 11.5–14.5)
GLUCOSE SERPL-MCNC: 117 MG/DL (ref 65–100)
HCT VFR BLD AUTO: 53.3 % (ref 36.6–50.3)
HGB BLD-MCNC: 18.2 G/DL (ref 12.1–17)
MAGNESIUM SERPL-MCNC: 2.3 MG/DL (ref 1.6–2.4)
MCH RBC QN AUTO: 31.4 PG (ref 26–34)
MCHC RBC AUTO-ENTMCNC: 34.1 G/DL (ref 30–36.5)
MCV RBC AUTO: 91.9 FL (ref 80–99)
NRBC # BLD: 0 K/UL (ref 0–0.01)
NRBC BLD-RTO: 0 PER 100 WBC
PLATELET # BLD AUTO: 288 K/UL (ref 150–400)
PMV BLD AUTO: 10.7 FL (ref 8.9–12.9)
POTASSIUM SERPL-SCNC: 4 MMOL/L (ref 3.5–5.1)
RBC # BLD AUTO: 5.8 M/UL (ref 4.1–5.7)
SODIUM SERPL-SCNC: 137 MMOL/L (ref 136–145)
STRESS BASELINE DIAS BP: 99 MMHG
STRESS BASELINE HR: 94 BPM
STRESS BASELINE SYS BP: 139 MMHG
STRESS PERCENT HR ACHIEVED: 85 %
STRESS POST PEAK HR: 135 BPM
STRESS ST DEPRESSION: 0 MM
STRESS ST ELEVATION: 0 MM
STRESS STAGE 1 DURATION: NORMAL MIN:SEC
STRESS STAGE 1 HR: 95 BPM
STRESS STAGE 2 BP: NORMAL MMHG
STRESS STAGE 2 DURATION: NORMAL MIN:SEC
STRESS STAGE 2 HR: 125 BPM
STRESS STAGE 3 BP: NORMAL MMHG
STRESS STAGE 3 DURATION: NORMAL MIN:SEC
STRESS STAGE 3 HR: 115 BPM
STRESS STAGE 4 BP: NORMAL MMHG
STRESS STAGE 4 DURATION: NORMAL MIN:SEC
STRESS STAGE 4 HR: 109 BPM
STRESS TARGET HR: 159 BPM
WBC # BLD AUTO: 11.6 K/UL (ref 4.1–11.1)

## 2020-11-04 PROCEDURE — A9500 TC99M SESTAMIBI: HCPCS

## 2020-11-04 PROCEDURE — 74011000250 HC RX REV CODE- 250: Performed by: HOSPITALIST

## 2020-11-04 PROCEDURE — 74011250637 HC RX REV CODE- 250/637: Performed by: HOSPITALIST

## 2020-11-04 PROCEDURE — 65270000029 HC RM PRIVATE

## 2020-11-04 PROCEDURE — 85027 COMPLETE CBC AUTOMATED: CPT

## 2020-11-04 PROCEDURE — 74011250636 HC RX REV CODE- 250/636: Performed by: INTERNAL MEDICINE

## 2020-11-04 PROCEDURE — 74011250636 HC RX REV CODE- 250/636

## 2020-11-04 PROCEDURE — 36415 COLL VENOUS BLD VENIPUNCTURE: CPT

## 2020-11-04 PROCEDURE — 74011250637 HC RX REV CODE- 250/637: Performed by: INTERNAL MEDICINE

## 2020-11-04 PROCEDURE — 93017 CV STRESS TEST TRACING ONLY: CPT

## 2020-11-04 PROCEDURE — 83735 ASSAY OF MAGNESIUM: CPT

## 2020-11-04 PROCEDURE — 74011000258 HC RX REV CODE- 258: Performed by: INTERNAL MEDICINE

## 2020-11-04 PROCEDURE — 80048 BASIC METABOLIC PNL TOTAL CA: CPT

## 2020-11-04 RX ORDER — DILTIAZEM HYDROCHLORIDE 120 MG/1
240 CAPSULE, COATED, EXTENDED RELEASE ORAL DAILY
Qty: 30 CAP | Refills: 0 | Status: SHIPPED | OUTPATIENT
Start: 2020-11-04 | End: 2021-09-04

## 2020-11-04 RX ORDER — DILTIAZEM HYDROCHLORIDE 120 MG/1
240 CAPSULE, COATED, EXTENDED RELEASE ORAL DAILY
Status: DISCONTINUED | OUTPATIENT
Start: 2020-11-04 | End: 2020-11-05 | Stop reason: HOSPADM

## 2020-11-04 RX ORDER — METOPROLOL TARTRATE 25 MG/1
25 TABLET, FILM COATED ORAL EVERY 12 HOURS
Qty: 60 TAB | Refills: 0 | Status: SHIPPED | OUTPATIENT
Start: 2020-11-04 | End: 2020-11-05

## 2020-11-04 RX ADMIN — Medication 10 ML: at 05:56

## 2020-11-04 RX ADMIN — DILTIAZEM HYDROCHLORIDE 240 MG: 120 CAPSULE, COATED, EXTENDED RELEASE ORAL at 10:40

## 2020-11-04 RX ADMIN — FAMOTIDINE 20 MG: 20 TABLET ORAL at 21:16

## 2020-11-04 RX ADMIN — METOPROLOL TARTRATE 25 MG: 25 TABLET, FILM COATED ORAL at 21:17

## 2020-11-04 RX ADMIN — REGADENOSON 0.4 MG: 0.08 INJECTION, SOLUTION INTRAVENOUS at 11:00

## 2020-11-04 RX ADMIN — APIXABAN 5 MG: 5 TABLET, FILM COATED ORAL at 12:27

## 2020-11-04 RX ADMIN — METOPROLOL TARTRATE 25 MG: 25 TABLET, FILM COATED ORAL at 12:25

## 2020-11-04 RX ADMIN — AMIODARONE HYDROCHLORIDE 0.5 MG/MIN: 50 INJECTION, SOLUTION INTRAVENOUS at 16:30

## 2020-11-04 RX ADMIN — ACETAMINOPHEN 650 MG: 325 TABLET, FILM COATED ORAL at 12:32

## 2020-11-04 RX ADMIN — FAMOTIDINE 20 MG: 20 TABLET ORAL at 12:25

## 2020-11-04 RX ADMIN — AMIODARONE HYDROCHLORIDE: 50 INJECTION, SOLUTION INTRAVENOUS at 15:48

## 2020-11-04 RX ADMIN — Medication 10 ML: at 15:57

## 2020-11-04 RX ADMIN — Medication 10 ML: at 21:16

## 2020-11-04 RX ADMIN — APIXABAN 5 MG: 5 TABLET, FILM COATED ORAL at 21:16

## 2020-11-04 RX ADMIN — Medication 5 MG/HR: at 08:30

## 2020-11-04 NOTE — PROGRESS NOTES
Bedside and Verbal shift change report given to Fahad Gillis RN (oncoming nurse) by Abiodun Tucker RN (offgoing nurse). Report included the following information SBAR, Kardex, Procedure Summary, Intake/Output, MAR and Recent Results. 1800: Bedside and Verbal shift change report given to CHEYENNE Cisneros (oncoming nurse) by Fahad Gillis RN (offgoing nurse). Report included the following information SBAR, Kardex, Intake/Output and Recent Results.

## 2020-11-04 NOTE — PROGRESS NOTES
Progress Note      11/4/2020 1:09 PM  NAME: Allen Castillo   MRN:  [id number]   Admit Diagnosis: Atrial fibrillation with RVR ([address] [I48.91]      Problem List:     1. Atrial fibrillation with rapid ventricular response. 2.  Hypertension     Assessment/Plan:   1. Patient on telemetry still experiencing atrial fibrillation with rapid ventricular response with heart rate up to 110-120 beats per minute  At rest which increases to 150 beats per minute with activity  with shortness of breath. I will change patient's Cardizem to IV amiodarone to optimize rate control with possible chemical cardioversion. Patient will continue Eliquis for anticoagulation. If patient's rate control despite amiodarone therapy remains an issue then I will consider electro-cardioversion. 2.   Hypertension, stable. 3.   Cardiomyopathy, LV ejection fraction 40-45%. No clinical evidence of congestive heart failure. I will check BNP. 4.    Question CAD with ischemic cardiomyopathy in view of patient's echo findings and risk factors. Lexiscan stress Cardiolite scan  results  from today are pending. 5.  Patient's care plan was discussed with attending physician Dr. Joey Carrillo.         []       High complexity decision making was performed in this patient at high risk for decompensation with multiple organ involvement. Subjective:     Allen Castillo denies chest pain, dyspnea. Discussed with RN events overnight. Review of Systems:    Symptom Y/N Comments  Symptom Y/N Comments   Fever/Chills N   Chest Pain N    Poor Appetite N   Edema N    Cough N   Abdominal Pain N    Sputum N   Joint Pain N    SOB/GONZALES y   Pruritis/Rash N    Nausea/vomit N   Tolerating PT/OT Y    Diarrhea N   Tolerating Diet Y    Constipation N   Other       Could NOT obtain due to:      Objective:      Physical Exam:    Last 24hrs VS reviewed since prior progress note.  Most recent are:    Visit Vitals  /85 (BP 1 Location: Right arm, BP Patient Position: Supine)   Pulse (!) 57   Temp 97.8 °F (36.6 °C)   Resp 18   Ht 5' 10\" (1.778 m)   Wt 90.3 kg (199 lb)   SpO2 96%   BMI 28.55 kg/m²     No intake or output data in the 24 hours ending 11/04/20 1402     General Appearance: Well developed, well nourished, alert & oriented x 3,    no acute distress. Ears/Nose/Mouth/Throat: Hearing grossly normal.  Neck: Supple. Chest: Lungs clear to auscultation bilaterally. Cardiovascular:   Tachycardia,Irregularly regular, S1S2 normal, no murmur. Abdomen: Soft, non-tender, bowel sounds are active. Extremities: No edema bilaterally. Skin: Warm and dry. []         Post-cath site without hematoma, bruit, tenderness, or thrill. Distal pulses intact. PMH/SH reviewed - no change compared to H&P    Data Review      Echocardiogram, 11/02/2020:  · LV: Estimated LVEF is 40 - 45%. Visually measured ejection fraction. Normal wall thickness. Mildly dilated left ventricle. Moderately and globally reduced systolic function. · LA: Mildly dilated left atrium. · AV: Cannot rule out bicuspid aortic valve. · MV: Mitral valve thickening. Mild to moderate mitral valve regurgitation is present. · TV: Moderate tricuspid valve regurgitation is present. · IVC: Severely elevated central venous pressure (15+ mmHg); IVC diameter is larger than 21 mm and collapses less than 50% with respiration. EKG:   []  No new EKG for review    XR CHEST PORT   Final Result   Impression: No acute pulmonary process.               Recent Results (from the past 24 hour(s))   BNP    Collection Time: 11/03/20  4:26 PM   Result Value Ref Range    NT pro-BNP 1,549 (H) <125 pg/mL   CBC W/O DIFF    Collection Time: 11/04/20  8:30 AM   Result Value Ref Range    WBC 11.6 (H) 4.1 - 11.1 K/uL    RBC 5.80 (H) 4.10 - 5.70 M/uL    HGB 18.2 (H) 12.1 - 17.0 g/dL    HCT 53.3 (H) 36.6 - 50.3 %    MCV 91.9 80.0 - 99.0 FL    MCH 31.4 26.0 - 34.0 PG    MCHC 34.1 30.0 - 36.5 g/dL    RDW 14.4 11.5 - 14.5 %    PLATELET 458 150 - 400 K/uL    MPV 10.7 8.9 - 12.9 FL    NRBC 0.0 0  WBC    ABSOLUTE NRBC 0.00 0.00 - 0.01 K/uL   NUCLEAR CARDIAC STRESS TEST    Collection Time: 11/04/20 12:07 PM   Result Value Ref Range    Target  bpm    Baseline HR 94 bpm    Baseline  mmHg    Percent HR 85 %    Post peak  bpm    Stress Base Diastolic BP 99 mmHg    Stress Stage 1 Duration 1min min:sec    Stress Stage 1 HR 95 bpm    Stress Stage 2 Duration 2min min:sec    Stress Stage 2  bpm    Stress Stage 2 /99 mmHg    Stress Stage 3 Duration 3min min:sec    Stress Stage 3  bpm    Stress Stage 3 /98 mmHg    Stress Stage 4 Duration 4min min:sec    Stress Stage 4  bpm    Stress Stage 4 /100 mmHg    ST Elevation (mm) 0 mm    ST Depression (mm) 0 mm        Echo Results  (Last 48 hours)    None          Patient's  EKG, laboratory data and echocardiogram were individually reviewed by me. Lab Data:    Recent Labs     11/04/20  0830 11/03/20  0242   WBC 11.6* 9.3   HGB 18.2* 15.7   HCT 53.3* 46.8    266     Recent Labs     11/02/20  0725   INR 1.1   PTP 13.9   APTT 31.1      Recent Labs     11/03/20  0242 11/02/20  0700    137   K 3.9 3.6    106   CO2 25 27   BUN 18 13   CREA 1.11 1.20   GLU 88 119*   CA 9.2 9.3     Recent Labs     11/02/20  0700   TROIQ <0.05     No results found for: CHOL, CHOLX, CHLST, CHOLV, HDL, HDLP, LDL, LDLC, DLDLP, TGLX, TRIGL, TRIGP, CHHD, CHHDX    Recent Labs     11/03/20  0242 11/02/20  0700   AP 83 94   TP 7.2 7.8   ALB 3.9 4.0   GLOB 3.3 3.8     No results for input(s): PH, PCO2, PO2 in the last 72 hours.     Medications Personally Reviewed:    Current Facility-Administered Medications   Medication Dose Route Frequency    dilTIAZem ER (CARDIZEM CD) capsule 240 mg  240 mg Oral DAILY    metoprolol tartrate (LOPRESSOR) tablet 25 mg  25 mg Oral Q12H    apixaban (ELIQUIS) tablet 5 mg  5 mg Oral BID    sodium chloride (NS) flush 5-40 mL  5-40 mL IntraVENous Q8H    sodium chloride (NS) flush 5-40 mL  5-40 mL IntraVENous PRN    acetaminophen (TYLENOL) tablet 650 mg  650 mg Oral Q6H PRN    Or    acetaminophen (TYLENOL) suppository 650 mg  650 mg Rectal Q6H PRN    polyethylene glycol (MIRALAX) packet 17 g  17 g Oral DAILY PRN    promethazine (PHENERGAN) tablet 12.5 mg  12.5 mg Oral Q6H PRN    Or    ondansetron (ZOFRAN) injection 4 mg  4 mg IntraVENous Q6H PRN    nicotine (NICODERM CQ) 21 mg/24 hr patch 1 Patch  1 Patch TransDERmal DAILY    famotidine (PEPCID) tablet 20 mg  20 mg Oral BID         Prior to Admission medications    Medication Sig Start Date End Date Taking? Authorizing Provider   apixaban (ELIQUIS) 5 mg tablet Take 1 Tab by mouth two (2) times a day. 11/4/20  Yes Jg Polk MD   metoprolol tartrate (LOPRESSOR) 25 mg tablet Take 1 Tab by mouth every twelve (12) hours. 11/4/20  Yes Jg Polk MD   dilTIAZem ER (CARDIZEM CD) 120 mg capsule Take 2 Caps by mouth daily.  11/4/20  Yes Jg Polk MD       Critical care time spent 40 minutes      Osmani Otto MD

## 2020-11-04 NOTE — DISCHARGE INSTRUCTIONS

## 2020-11-04 NOTE — PROGRESS NOTES
Problem: Discharge Planning  Goal: *Discharge to safe environment  Outcome: Progressing Towards Goal  Goal: *Knowledge of medication management  Outcome: Progressing Towards Goal     Problem: Discharge Planning  Goal: *Knowledge of medication management  Outcome: Progressing Towards Goal

## 2020-11-04 NOTE — DISCHARGE SUMMARY
Hospitalist Discharge Summary     Patient ID:  Elliott Nolan  [id number]  89 y.o.  1960  11/2/2020    PCP on record: Alonzo Alfredo MD    Admit date: 11/2/2020  Discharge date and time: 11/4/2020    DISCHARGE DIAGNOSIS:    Atrial fibrillation with RVR    CONSULTATIONS:  IP CONSULT TO CARDIOLOGY    Excerpted HPI from H&P of Yoko Lion MD:  Elliott Nolan is a 63 y.o. male who has a history of A. Fibrillation came to ER with a complaint of shortness of breath and palpitation. Patient was found to have atrial fibrillation with rapid ventricular rate and was started on Cardizem drip. Patient states that he was diagnosed with atrial fibrillation about a year ago and was started on Xarelto and rate control medications. Patient could not afford Xarelto and stopped taking it after 3 months. He did not wanted to get anticoagulation with Coumadin as well if he had to get his blood work done for monitoring INR. Following which he completely stopped following with any cardiologist.  Today patient came to the ER with his worsening shortness of breath for last couple days and last night he started having palpitations and shortness of breath was much worse which prompted him to come to the hospital.  He denies any chest pain. Denies any fever rigors or chills. Hospitalist service was requested to admit the patient for further work-up and management.    ______________________________________________________________________  DISCHARGE SUMMARY/HOSPITAL COURSE:  for full details see H&P, daily progress notes, labs, consult notes. Patient was subsequently admitted with a diagnosis of atrial fibrillation with RVR, patient was evaluated by cardiology, patient received a transthoracic echo that showed an EF of 45%. Patient was started on p.o.  Cardizem as well as metoprolol twice a day, patient's anticoagulation was transitioned to Eliquis twice daily, subsequent to this patient received a stress test, following which as patient remained hemodynamically stable they were deemed stable for discharge with outpatient follow-up with his primary care physician as well as with cardiology in a 2-week.          _______________________________________________________________________  Patient seen and examined by me on discharge day. Pertinent Findings:  Gen:    Not in distress  Chest: Clear lungs  CVS:   Regular rhythm. No edema  Abd:  Soft, not distended, not tender  Neuro:  Alert, Oriented x 4  _______________________________________________________________________  DISCHARGE MEDICATIONS:   Current Discharge Medication List      START taking these medications    Details   apixaban (ELIQUIS) 5 mg tablet Take 1 Tab by mouth two (2) times a day. Qty: 60 Tab, Refills: 0      metoprolol tartrate (LOPRESSOR) 25 mg tablet Take 1 Tab by mouth every twelve (12) hours. Qty: 60 Tab, Refills: 0      dilTIAZem ER (CARDIZEM CD) 120 mg capsule Take 2 Caps by mouth daily. Qty: 30 Cap, Refills: 0               Patient Follow Up Instructions: Activity: Activity as tolerated  Diet: Cardiac Diet  Wound Care: None needed    Follow-up with PCP/Dr. Elissa Burch in 2 weeks.   Follow-up tests/labs As per PCP/Cardiology  Follow-up Information     Follow up With Specialties Details Why Contact Info    Frantz Fletcher MD Family Medicine In 2 weeks  [address] [facility] Essencerupert Sanchez MD Cardiology In 2 weeks  [address] [facility]  [phone]          ________________________________________________________________    Risk of deterioration: Low    Condition at Discharge:  Stable  __________________________________________________________________    Disposition  Home with family, no needs    ____________________________________________________________________    Code Status: Full Code  ___________________________________________________________________      Total time in minutes spent coordinating this discharge (includes going over instructions, follow-up, prescriptions, and preparing report for sign off to her PCP) :  40 minutes    Signed:   Kim Clarke MD

## 2020-11-04 NOTE — PROGRESS NOTES
Hospitalist Progress Note    NAME: Bernadette Womack   :  1960   MRN:  [id number]           Subjective:     Chief Complaint / Reason for Physician Visit  Patient seen and examined at bedside, of note patient feels significantly better, no acute events overnight, patient is currently still in atrial fibrillation with RVR. Discussed with RN events overnight. Review of Systems:  Symptom Y/N Comments  Symptom Y/N Comments   Fever/Chills N   Chest Pain N    Poor Appetite N   Edema N    Cough N   Abdominal Pain N    Sputum N   Joint Pain N    SOB/GONZALES Y   Pruritis/Rash N    Nausea/vomit N   Tolerating PT/OT NA    Diarrhea N   Tolerating Diet Y     Constipation N   Other       Could NOT obtain due to:    patient denies any fevers chills nausea vomiting lightheadedness dizziness orthopnea paroxysmal nocturnal dyspnea, chest pain headache focal weakness loss of sensation auditory or visual symptoms abdominal stool or urinary complaints or any other associated symptoms. Objective:     VITALS:   Last 24hrs VS reviewed since prior progress note. Most recent are:  Patient Vitals for the past 24 hrs:   Temp Pulse Resp BP SpO2   20 1217 97.8 °F (36.6 °C) (!) 57 18 130/85 96 %   20 1200  (!) 120      20 1053    (!) 139/99    20 1040  91      20 0844 97.8 °F (36.6 °C) (!) 53 20 112/82 97 %   20 0800  (!) 53      20 0535 97.4 °F (36.3 °C) 70 20 126/75 98 %   20 0400  70      20 0120 97.9 °F (36.6 °C) 75 20 113/82 96 %   20 0000  70      20 2059 97.8 °F (36.6 °C) 76 18 134/85 97 %   20 2000  70      20 1532 97.9 °F (36.6 °C) (!) 110 18 134/79 97 %     No intake or output data in the 24 hours ending 20 1335     PHYSICAL EXAM:  General: WD, WN. Alert, cooperative, no acute distress    EENT:  EOMI. Anicteric sclerae. MMM  Resp:  CTA bilaterally, no wheezing or rales.   No accessory muscle use  CV:  Irregularly irregular, tachycardic, no murmurs rubs or gallops,  No edema  GI:  Soft, Non distended, Non tender. +Bowel sounds  Neurologic:  Alert and oriented X 3, normal speech,   Psych:   Good insight. Not anxious nor agitated  Skin:  No rashes. No jaundice    Reviewed most current lab test results and cultures  YES  Reviewed most current radiology test results   YES  Review and summation of old records today    NO  Reviewed patient's current orders and MAR    YES  PMH/SH reviewed - no change compared to H&P    Procedures: see electronic medical records for all procedures/Xrays and details which were not copied into this note but were reviewed prior to creation of Plan. LABS:  I reviewed today's most current labs and imaging studies. Pertinent labs include:  Recent Labs     11/03/20 0242 11/02/20  0700   WBC 9.3 8.9   HGB 15.7 16.5   HCT 46.8 48.5    296     Recent Labs     11/03/20 0242 11/02/20  0725 11/02/20  0700     --  137   K 3.9  --  3.6     --  106   CO2 25  --  27   GLU 88  --  119*   BUN 18  --  13   CREA 1.11  --  1.20   CA 9.2  --  9.3   ALB 3.9  --  4.0   TBILI 0.7  --  0.8   ALT 15  --  18   INR  --  1.1  --        Signed: Cynthia Wayne MD      Assessment / Plan:  Atrial fibrillation with RVRpatient presented with above-mentioned symptomatology and was found to be in atrial fibrillation with RVR, of note patient is still in atrial fibrillation with RVR on increased p.o. Cardizem dosage as well as metoprolol twice daily  Initiate amiodarone gtt.   If patient remains in A. fib tomorrow we will cardiovert  Continue Eliquis twice daily  Cardiology consult appreciated, will continue to follow    Hypokalemiacurrently resolved    Acute kidney injurylikely multifactorial, serum creatinine currently downtrending  Follow-up repeat serum creatinine    Dyspepsiacontinue Pepcid    Prophylaxispatient is on Eliquis  FENcardiac diet, replete potassium and magnesium  Full code  [address] [facility] clinical improvement, likely home tomorrow depending on progress with atrial fibrillation    18.5 - 24.9 Normal weight / Body mass index is 27.9 kg/m². Code status: Full  Prophylaxis: Eliquis  Recommended Disposition: Home w/Family     ________________________________________________________________________  Care Plan discussed with:    Comments   Patient X    Family      RN X    Care Manager X    Consultant  X                     X Multidiciplinary team rounds were held today with , nursing, pharmacist and clinical coordinator. Patient's plan of care was discussed; medications were reviewed and discharge planning was addressed.      ________________________________________________________________________  Total NON critical care TIME:  35   Minutes      Comments   >50% of visit spent in counseling and coordination of care X    ________________________________________________________________________  Ashlee Howard MD

## 2020-11-04 NOTE — PROGRESS NOTES
Physician Progress Note      PATIENT:               Krishna Thompson  CSN #:                  [id number]  :                       1960  ADMIT DATE:       2020 6:59 AM  DISCH DATE:  RESPONDING  PROVIDER #:        Duyen Collazo MD          QUERY TEXT:    Dear Hospitalist,    Patient admitted with shortness of breath and palpitations, noted to have atrial fibrillation. If possible, please document in progress notes and discharge summary further specificity regarding the type of atrial fibrillation: The medical record reflects the following:  Risk Factors: 63year old male, PMH A-Fib  Clinical Indicators:  H&P - has a history of A. Fibrillation came to ER with a complaint of shortness of breath and palpitation. Patient was found to have atrial fibrillation with rapid ventricular rate and was started on Cardizem drip.  EKG - Atrial fibrillation with rapid ventricular response  Treatment: IV Cardizem and Eliquis    Chronic: nonspecific term that could be referring to paroxysmal, persistent, or permanent  Longstanding persistent: persistent and continuous, lasting > 1 year. Paroxysmal - self-terminating or intermittent; resolves with or without intervention within 7 days of onset; may recur with various frequency. Persistent - Fails to terminate within 7 days; Often requires meds or cardioversion to restore to NSR. Permanent - longstanding & persistent; Medication has been ineffective in restoring NSR &/or cardioversion is contraindicated    Definitions per MS-[employer] Training Guide and Quick Reference Guide, Nelsy Crouch 112 5 Diseases and Disorders of the Circulatory System; 2019; play140. Software content from the play140?  [employer] Transformation Program.    Please call [phone] with any questions  Options provided:  -- Paroxysmal Atrial Fibrillation  -- Longstanding Persistent Atrial Fibrillation  -- Permanent Atrial Fibrillation  -- Persistent Atrial Fibrillation  -- Chronic Atrial Fibrillation, unspecified  -- Other - I will add my own diagnosis  -- Disagree - Not applicable / Not valid  -- Disagree - Clinically unable to determine / Unknown  -- Refer to Clinical Documentation Reviewer    PROVIDER RESPONSE TEXT:    This patient has paroxysmal atrial fibrillation. Query created by:  Abner Holbrook on 11/4/2020 12:25 PM      Electronically signed by:  Shahram Villagran MD 11/4/2020 12:33 PM

## 2020-11-05 VITALS
OXYGEN SATURATION: 95 % | BODY MASS INDEX: 28.49 KG/M2 | RESPIRATION RATE: 20 BRPM | HEART RATE: 98 BPM | SYSTOLIC BLOOD PRESSURE: 122 MMHG | HEIGHT: 70 IN | DIASTOLIC BLOOD PRESSURE: 78 MMHG | WEIGHT: 199 LBS | TEMPERATURE: 98.1 F

## 2020-11-05 LAB
ATRIAL RATE: 85 BPM
CALCULATED R AXIS, ECG10: 96 DEGREES
CALCULATED T AXIS, ECG11: 82 DEGREES
DIAGNOSIS, 93000: NORMAL
Q-T INTERVAL, ECG07: 406 MS
QRS DURATION, ECG06: 102 MS
QTC CALCULATION (BEZET), ECG08: 483 MS
VENTRICULAR RATE, ECG03: 85 BPM

## 2020-11-05 PROCEDURE — 74011250637 HC RX REV CODE- 250/637: Performed by: INTERNAL MEDICINE

## 2020-11-05 PROCEDURE — 93005 ELECTROCARDIOGRAM TRACING: CPT

## 2020-11-05 PROCEDURE — 74011250637 HC RX REV CODE- 250/637: Performed by: HOSPITALIST

## 2020-11-05 RX ORDER — AMIODARONE HYDROCHLORIDE 200 MG/1
200 TABLET ORAL 2 TIMES DAILY
Status: DISCONTINUED | OUTPATIENT
Start: 2020-11-05 | End: 2020-11-05 | Stop reason: HOSPADM

## 2020-11-05 RX ORDER — AMIODARONE HYDROCHLORIDE 200 MG/1
200 TABLET ORAL EVERY 12 HOURS
Qty: 60 TAB | Refills: 0 | Status: SHIPPED | OUTPATIENT
Start: 2020-11-05

## 2020-11-05 RX ADMIN — ACETAMINOPHEN 650 MG: 325 TABLET, FILM COATED ORAL at 01:57

## 2020-11-05 RX ADMIN — AMIODARONE HYDROCHLORIDE 200 MG: 200 TABLET ORAL at 14:45

## 2020-11-05 RX ADMIN — METOPROLOL TARTRATE 25 MG: 25 TABLET, FILM COATED ORAL at 09:31

## 2020-11-05 RX ADMIN — Medication 10 ML: at 06:00

## 2020-11-05 RX ADMIN — FAMOTIDINE 20 MG: 20 TABLET ORAL at 09:31

## 2020-11-05 RX ADMIN — APIXABAN 5 MG: 5 TABLET, FILM COATED ORAL at 09:31

## 2020-11-05 RX ADMIN — DILTIAZEM HYDROCHLORIDE 240 MG: 120 CAPSULE, COATED, EXTENDED RELEASE ORAL at 09:31

## 2020-11-05 NOTE — DISCHARGE SUMMARY
Hospitalist Discharge Summary     Patient ID:  Samanta Hanna  [id number]  89 y.o.  1960  11/2/2020    PCP on record: Дмитрий Wilson MD    Admit date: 11/2/2020  Discharge date and time: 11/5/2020    DISCHARGE DIAGNOSIS:    Atrial fibrillation with RVR    CONSULTATIONS:  IP CONSULT TO CARDIOLOGY    Excerpted HPI from H&P of Alethea Jenkins MD:  Nurialance Paul a 63 y. o. male who has a history of A.  Fibrillation came to ER with a complaint of shortness of breath and palpitation.  Patient was found to have atrial fibrillation with rapid ventricular rate and was started on Cardizem drip.  Patient states that he was diagnosed with atrial fibrillation about a year ago and was started on Xarelto and rate control medications.  Patient could not afford Xarelto and stopped taking it after 3 months.  He did not wanted to get anticoagulation with Coumadin as well if he had to get his blood work done for monitoring INR.  Following which he completely stopped following with any cardiologist. Marnie Mendoza patient came to the ER with his worsening shortness of breath for last couple days and last night he started having palpitations and shortness of breath was much worse which prompted him to come to the hospital. Dagmar Álvarez denies any chest pain.  Denies any fever rigors or chills.  Hospitalist service was requested to admit the patient for further work-up and management.    ______________________________________________________________________  DISCHARGE SUMMARY/HOSPITAL COURSE:  for full details see H&P, daily progress notes, labs, consult notes. Patient was subsequently admitted with a diagnosis of atrial fibrillation with RVR, patient was evaluated by cardiology, patient received a transthoracic echo that showed an EF of 45%. Patient was started on p.o.  Cardizem as well as metoprolol twice a day, with no improvement, pt subsequently was placed on IV amiodarone and transitioned to PO amiodarone and PO Cardizem, patient's anticoagulation was transitioned to Eliquis twice daily, subsequent to this patient received a stress test, following which as patient remained hemodynamically stable they were deemed stable for discharge with outpatient follow-up with his primary care physician as well as with cardiology in a 4 weeks for further evaluation. _______________________________________________________________________  Patient seen and examined by me on discharge day. Pertinent Findings:  Gen:    Not in distress  Chest: Clear lungs  CVS:   Regular rhythm. No edema  Abd:  Soft, not distended, not tender  Neuro:  Alert,Oriented x 4  _______________________________________________________________________  DISCHARGE MEDICATIONS:   Current Discharge Medication List      START taking these medications    Details   amiodarone (CORDARONE) 200 mg tablet Take 1 Tab by mouth every twelve (12) hours every twelve (12) hours. Qty: 60 Tab, Refills: 0      apixaban (ELIQUIS) 5 mg tablet Take 1 Tab by mouth two (2) times a day. Qty: 60 Tab, Refills: 0      dilTIAZem ER (CARDIZEM CD) 120 mg capsule Take 2 Caps by mouth daily. Qty: 30 Cap, Refills: 0               Patient Follow Up Instructions: Activity: Activity as tolerated  Diet: Cardiac Diet  Wound Care: As directed    Follow-up with PCP/Cardiology in 2 weeks.   Follow-up tests/labs As per PCP/Cardiology  Follow-up Information     Follow up With Specialties Details Why Contact Info    Long Sim MD Family Medicine In 2 weeks Patient or family member must call to make appointment [address] [facility] Essencerupert Mackey MD Internal Medicine On 11/18/2020 @ 10:45am [place] [address]      Charly Velarde MD Cardiology In 4 weeks  [place] [address]  [phone]          ________________________________________________________________    Risk of deterioration: Low    Condition at Discharge: Stable  __________________________________________________________________    Disposition  Home with family, no needs    ____________________________________________________________________    Code Status: Full Code  ___________________________________________________________________      Total time in minutes spent coordinating this discharge (includes going over instructions, follow-up, prescriptions, and preparing report for sign off to her PCP) :  40 minutes    Signed:   Harding Kussmaul, MD

## 2020-11-05 NOTE — PROGRESS NOTES
Progress Note      11/5/2020 1:09 PM  NAME: Allen Castillo   MRN:  [id number]   Admit Diagnosis: Atrial fibrillation with RVR ([facility] [address] [I48.91]      Problem List:     1. Atrial fibrillation with rapid ventricular response. 2.  Hypertension     Assessment/Plan:   1. Patient feels better today post optimization of rate control with the addition of IV amiodarone drip. Patient's heart rate now is ranging from 70s low 100 on ambulation. I will change patient's IV amiodarone drip to oral amiodarone 200 mg p.o. twice daily. Patient will continue long-acting Cardizem and Eliquis at present doses. 2.   Hypertension, stable. 3.   Cardiomyopathy, LV ejection fraction 40-45%. Patient's BNP is elevated however chest x-ray and clinically has no evidence of CHF. Patient's stress Cardiolite unremarkable for ischemia. Cardiomyopathy appears to be nonischemic cardiomyopathy probably related to A. fib with RVR. 5.  Patient's care plan was discussed with attending physician Dr. Joey Carrillo. Patient has been cleared for discharge with follow-up with me in 3 to 4 weeks time for consideration of of an electrocardioversion as an outpatient. []       High complexity decision making was performed in this patient at high risk for decompensation with multiple organ involvement. Subjective:     Allen Castillo denies chest pain, dyspnea. Discussed with RN events overnight. Review of Systems:    Symptom Y/N Comments  Symptom Y/N Comments   Fever/Chills N   Chest Pain N    Poor Appetite N   Edema N    Cough N   Abdominal Pain N    Sputum N   Joint Pain N    SOB/GONZALES y   Pruritis/Rash N    Nausea/vomit N   Tolerating PT/OT Y    Diarrhea N   Tolerating Diet Y    Constipation N   Other       Could NOT obtain due to:      Objective:      Physical Exam:    Last 24hrs VS reviewed since prior progress note. Most recent are:    Visit Vitals  /76 (BP 1 Location: Left arm, BP Patient Position: At rest;Supine; Head of bed elevated (Comment degrees))   Pulse 88   Temp 97.6 °F (36.4 °C)   Resp 18   Ht 5' 10\" (1.778 m)   Wt 90.3 kg (199 lb)   SpO2 100%   BMI 28.55 kg/m²       Intake/Output Summary (Last 24 hours) at 11/5/2020 1105  Last data filed at 11/4/2020 1841  Gross per 24 hour   Intake 906 ml   Output    Net 906 ml        General Appearance: Well developed, well nourished, alert & oriented x 3,    no acute distress. Ears/Nose/Mouth/Throat: Hearing grossly normal.  Neck: Supple. Chest: Lungs clear to auscultation bilaterally. Cardiovascular:   Tachycardia,Irregularly regular, S1S2 normal, no murmur. Abdomen: Soft, non-tender, bowel sounds are active. Extremities: No edema bilaterally. Skin: Warm and dry. []         Post-cath site without hematoma, bruit, tenderness, or thrill. Distal pulses intact. PMH/SH reviewed - no change compared to H&P    Data Review      Echocardiogram, 11/02/2020:  · LV: Estimated LVEF is 40 - 45%. Visually measured ejection fraction. Normal wall thickness. Mildly dilated left ventricle. Moderately and globally reduced systolic function. · LA: Mildly dilated left atrium. · AV: Cannot rule out bicuspid aortic valve. · MV: Mitral valve thickening. Mild to moderate mitral valve regurgitation is present. · TV: Moderate tricuspid valve regurgitation is present. · IVC: Severely elevated central venous pressure (15+ mmHg); IVC diameter is larger than 21 mm and collapses less than 50% with respiration. EKG:   []  No new EKG for review    XR CHEST PORT   Final Result   Impression: No acute pulmonary process.               Recent Results (from the past 24 hour(s))   NUCLEAR CARDIAC STRESS TEST    Collection Time: 11/04/20 12:07 PM   Result Value Ref Range    Target  bpm    Baseline HR 94 bpm    Baseline  mmHg    Percent HR 85 %    Post peak  bpm    Stress Base Diastolic BP 99 mmHg    Stress Stage 1 Duration 1min min:sec    Stress Stage 1 HR 95 bpm    Stress Stage 2 Duration 2min min:sec    Stress Stage 2  bpm    Stress Stage 2 /99 mmHg    Stress Stage 3 Duration 3min min:sec    Stress Stage 3  bpm    Stress Stage 3 /98 mmHg    Stress Stage 4 Duration 4min min:sec    Stress Stage 4  bpm    Stress Stage 4 /100 mmHg    ST Elevation (mm) 0 mm    ST Depression (mm) 0 mm   EKG, 12 LEAD, SUBSEQUENT    Collection Time: 11/05/20  8:45 AM   Result Value Ref Range    Ventricular Rate 85 BPM    Atrial Rate 85 BPM    QRS Duration 102 ms    Q-T Interval 406 ms    QTC Calculation (Bezet) 483 ms    Calculated R Axis 96 degrees    Calculated T Axis 82 degrees    Diagnosis       Afib with  controlled VR  Rightward axis  Prolonged QT  Abnormal ECG  No previous ECGs available  Confirmed by AZALIA Noriega MD ([id number]) on 11/5/2020 11:03:09 AM          Echo Results  (Last 48 hours)    None          Patient's  EKG, laboratory data and echocardiogram were individually reviewed by me. Lab Data:    Recent Labs     11/04/20  0830 11/03/20  0242   WBC 11.6* 9.3   HGB 18.2* 15.7   HCT 53.3* 46.8    266     No results for input(s): INR, PTP, APTT, INREXT, INREXT in the last 72 hours. Recent Labs     11/04/20  1030 11/03/20  0242    140   K 4.0 3.9    108   CO2 26 25   BUN 17 18   CREA 1.29 1.11   * 88   CA 9.7 9.2   MG 2.3  --      No results for input(s): CPK, CKNDX, TROIQ in the last 72 hours. No lab exists for component: CPKMB  No results found for: CHOL, CHOLX, CHLST, CHOLV, HDL, HDLP, LDL, LDLC, DLDLP, TGLX, TRIGL, TRIGP, CHHD, CHHDX    Recent Labs     11/03/20  0242   AP 83   TP 7.2   ALB 3.9   GLOB 3.3     No results for input(s): PH, PCO2, PO2 in the last 72 hours.     Medications Personally Reviewed:    Current Facility-Administered Medications   Medication Dose Route Frequency    dilTIAZem ER (CARDIZEM CD) capsule 240 mg  240 mg Oral DAILY    amiodarone (CORDARONE) 900 mg in dextrose 5% 250 mL infusion  0.5-1 mg/min IntraVENous TITRATE  metoprolol tartrate (LOPRESSOR) tablet 25 mg  25 mg Oral Q12H    apixaban (ELIQUIS) tablet 5 mg  5 mg Oral BID    sodium chloride (NS) flush 5-40 mL  5-40 mL IntraVENous Q8H    sodium chloride (NS) flush 5-40 mL  5-40 mL IntraVENous PRN    acetaminophen (TYLENOL) tablet 650 mg  650 mg Oral Q6H PRN    Or    acetaminophen (TYLENOL) suppository 650 mg  650 mg Rectal Q6H PRN    polyethylene glycol (MIRALAX) packet 17 g  17 g Oral DAILY PRN    promethazine (PHENERGAN) tablet 12.5 mg  12.5 mg Oral Q6H PRN    Or    ondansetron (ZOFRAN) injection 4 mg  4 mg IntraVENous Q6H PRN    nicotine (NICODERM CQ) 21 mg/24 hr patch 1 Patch  1 Patch TransDERmal DAILY    famotidine (PEPCID) tablet 20 mg  20 mg Oral BID         Prior to Admission medications    Medication Sig Start Date End Date Taking? Authorizing Provider   amiodarone (CORDARONE) 200 mg tablet Take 1 Tab by mouth every twelve (12) hours every twelve (12) hours. 11/5/20  Yes Trena Lind MD   apixaban (ELIQUIS) 5 mg tablet Take 1 Tab by mouth two (2) times a day. 11/4/20  Yes Trena Lind MD   dilTIAZem ER (CARDIZEM CD) 120 mg capsule Take 2 Caps by mouth daily.  11/4/20  Yes Trena Lind MD       Clinical  care time spent 30 minutes      Brooke Duncan MD

## 2020-11-05 NOTE — PROGRESS NOTES
Hospitalist Progress Note    NAME: Orin Garner   :  1960   MRN:  [id number]           Subjective:     Chief Complaint / Reason for Physician Visit  Patient seen and examined at bedside. He notes nocturnal dyspnea that improved upon sitting up; he states that he was unable to sleep due to the dyspnea. Denies any chest pain, nausea, vomiting, or diarrhea. Review of Systems:  Symptom Y/N Comments  Symptom Y/N Comments   Fever/Chills N   Chest Pain N    Poor Appetite N   Edema N    Cough N   Abdominal Pain N    Sputum N   Joint Pain N    SOB/GONZALES Y   Pruritis/Rash N    Nausea/vomit N   Tolerating PT/OT NA    Diarrhea N   Tolerating Diet Y     Constipation N   Other       Could NOT obtain due to:      Objective:     VITALS:   Last 24hrs VS reviewed since prior progress note. Most recent are:  Patient Vitals for the past 24 hrs:   Temp Pulse Resp BP SpO2   20 0720 97.6 °F (36.4 °C) 88 18 117/76 100 %   20 0301 98 °F (36.7 °C) 72 18 110/71 97 %   20 2309 97.7 °F (36.5 °C) (!) 107 18 114/84 96 %   20 1923 97.7 °F (36.5 °C) 64 18 113/84 100 %   20 1842  84  108/80    20 1818  86      20 1628 97.4 °F (36.3 °C) 65 20 106/72 97 %   20 1217 97.8 °F (36.6 °C) (!) 57 18 130/85 96 %   20 1200  (!) 120      20 1053    (!) 139/99    20 1040  91          Intake/Output Summary (Last 24 hours) at 2020 0911  Last data filed at 2020 1841  Gross per 24 hour   Intake 906 ml   Output    Net 906 ml        PHYSICAL EXAM:  General: WD, WN. Alert, cooperative, no acute distress    EENT:  EOMI. Anicteric sclerae. MMM  Resp:  CTA bilaterally, no wheezing or rales. No accessory muscle use  CV:  Irregularly irregular, tachycardic, no murmurs rubs or gallops,  No edema  GI:  Soft, Non distended, Non tender. +Bowel sounds  Neurologic:  Alert and oriented X 3, normal speech,   Psych:   Good insight. Not anxious nor agitated  Skin:  No rashes. No jaundice    Reviewed most current lab test results and cultures  YES  Reviewed most current radiology test results   YES  Review and summation of old records today    NO  Reviewed patient's current orders and MAR    YES  PMH/SH reviewed - no change compared to H&P    Procedures: see electronic medical records for all procedures/Xrays and details which were not copied into this note but were reviewed prior to creation of Plan. LABS:  I reviewed today's most current labs and imaging studies. Pertinent labs include:  Recent Labs     11/03/20  0242 11/02/20  0700   WBC 9.3 8.9   HGB 15.7 16.5   HCT 46.8 48.5    296     Recent Labs     11/03/20  0242 11/02/20  0725 11/02/20  0700     --  137   K 3.9  --  3.6     --  106   CO2 25  --  27   GLU 88  --  119*   BUN 18  --  13   CREA 1.11  --  1.20   CA 9.2  --  9.3   ALB 3.9  --  4.0   TBILI 0.7  --  0.8   ALT 15  --  18   INR  --  1.1  --        Signed: Oscar Cano MD      Assessment / Plan:  Atrial fibrillation with RVRpatient presented with above-mentioned symptomatology and was found to be in atrial fibrillation with RVR, of note patient is still in atrial fibrillation with RVR on increased p.o. Cardizem dosage as well as metoprolol twice daily  Initiate amiodarone gtt. If patient remains in A. fib we will cardiovert  Continue Eliquis twice daily  Cardiology consult appreciated, will continue to follow    Hypokalemiacurrently resolved    Acute kidney injurylikely multifactorial, serum creatinine currently downtrending  Follow-up repeat serum creatinine    Dyspepsiacontinue Pepcid    Prophylaxispatient is on Eliquis  FENcardiac diet, replete potassium and magnesium  Full code  Dispositionpending clinical improvement, likely home tomorrow depending on progress with atrial fibrillation    18.5 - 24.9 Normal weight / Body mass index is 27.9 kg/m².     Code status: Full  Prophylaxis: Eliquis  Recommended Disposition: Home w/Family     ________________________________________________________________________  Care Plan discussed with:    Comments   Patient X    Family      RN X    Care Manager X    Consultant  X                     X Multidiciplinary team rounds were held today with , nursing, pharmacist and clinical coordinator. Patient's plan of care was discussed; medications were reviewed and discharge planning was addressed.      ________________________________________________________________________  Total NON critical care TIME:  35   Minutes      Comments   >50% of visit spent in counseling and coordination of care X    ________________________________________________________________________  Marijo Holter

## 2021-03-07 ENCOUNTER — HOSPITAL ENCOUNTER (EMERGENCY)
Age: 61
Discharge: HOME | End: 2021-03-07
Attending: EMERGENCY MEDICINE
Payer: COMMERCIAL

## 2021-03-07 VITALS
BODY MASS INDEX: 28 KG/M2 | SYSTOLIC BLOOD PRESSURE: 137 MMHG | WEIGHT: 200 LBS | TEMPERATURE: 97.6 F | RESPIRATION RATE: 16 BRPM | DIASTOLIC BLOOD PRESSURE: 98 MMHG | HEIGHT: 71 IN | HEART RATE: 124 BPM | OXYGEN SATURATION: 100 %

## 2021-03-07 DIAGNOSIS — T50.904A DRUG OVERDOSE, UNDETERMINED INTENT, INITIAL ENCOUNTER: Primary | ICD-10-CM

## 2021-03-07 DIAGNOSIS — R55 SYNCOPE AND COLLAPSE: ICD-10-CM

## 2021-03-07 DIAGNOSIS — I48.20 ATRIAL FIBRILLATION, CHRONIC (HCC): ICD-10-CM

## 2021-03-07 LAB
AMPHET UR QL SCN: NEGATIVE
ANION GAP SERPL CALC-SCNC: 6 MMOL/L (ref 5–15)
BARBITURATES UR QL SCN: NEGATIVE
BASOPHILS # BLD: 0 K/UL (ref 0–0.1)
BASOPHILS NFR BLD: 0 % (ref 0–1)
BENZODIAZ UR QL: NEGATIVE
BUN SERPL-MCNC: 15 MG/DL (ref 6–20)
BUN/CREAT SERPL: 12 (ref 12–20)
BUPRENORPHINE UR QL: NEGATIVE
CA-I BLD-MCNC: 9.5 MG/DL (ref 8.5–10.1)
CANNABINOIDS UR QL SCN: NEGATIVE
CHLORIDE SERPL-SCNC: 101 MMOL/L (ref 97–108)
CO2 SERPL-SCNC: 31 MMOL/L (ref 21–32)
COCAINE UR QL SCN: POSITIVE
CREAT SERPL-MCNC: 1.22 MG/DL (ref 0.7–1.3)
DIFFERENTIAL METHOD BLD: ABNORMAL
EOSINOPHIL # BLD: 0.1 K/UL (ref 0–0.4)
EOSINOPHIL NFR BLD: 1 % (ref 0–7)
ERYTHROCYTE [DISTWIDTH] IN BLOOD BY AUTOMATED COUNT: 14.8 % (ref 11.5–14.5)
ETHANOL SERPL-MCNC: <4 MG/DL
GLUCOSE SERPL-MCNC: 190 MG/DL (ref 65–100)
HCT VFR BLD AUTO: 46.4 % (ref 36.6–50.3)
HGB BLD-MCNC: 15.1 G/DL (ref 12.1–17)
IMM GRANULOCYTES # BLD AUTO: 0 K/UL (ref 0–0.04)
IMM GRANULOCYTES NFR BLD AUTO: 0 % (ref 0–0.5)
INR PPP: 1.1 (ref 0.9–1.1)
LYMPHOCYTES # BLD: 2.5 K/UL (ref 0.8–3.5)
LYMPHOCYTES NFR BLD: 20 % (ref 12–49)
MCH RBC QN AUTO: 31.5 PG (ref 26–34)
MCHC RBC AUTO-ENTMCNC: 32.5 G/DL (ref 30–36.5)
MCV RBC AUTO: 96.7 FL (ref 80–99)
METHADONE UR QL: NEGATIVE
METHAMPHET UR QL: NEGATIVE
MONOCYTES # BLD: 0.7 K/UL (ref 0–1)
MONOCYTES NFR BLD: 5 % (ref 5–13)
NEUTS SEG # BLD: 9.2 K/UL (ref 1.8–8)
NEUTS SEG NFR BLD: 74 % (ref 32–75)
OPIATES UR QL: NEGATIVE
OXYCODONE UR QL SCN: NEGATIVE
PCP UR QL: NEGATIVE
PLATELET # BLD AUTO: 286 K/UL (ref 150–400)
PMV BLD AUTO: 9.6 FL (ref 8.9–12.9)
POTASSIUM SERPL-SCNC: 4.7 MMOL/L (ref 3.5–5.1)
PROPOXYPH UR QL: NEGATIVE
PROTHROMBIN TIME: 11.1 SEC (ref 9–11.1)
RBC # BLD AUTO: 4.8 M/UL (ref 4.1–5.7)
SODIUM SERPL-SCNC: 138 MMOL/L (ref 136–145)
TRICYCLICS UR QL: NEGATIVE
TROPONIN I SERPL-MCNC: <0.05 NG/ML
WBC # BLD AUTO: 12.5 K/UL (ref 4.1–11.1)

## 2021-03-07 PROCEDURE — 96374 THER/PROPH/DIAG INJ IV PUSH: CPT

## 2021-03-07 PROCEDURE — 82077 ASSAY SPEC XCP UR&BREATH IA: CPT

## 2021-03-07 PROCEDURE — 36415 COLL VENOUS BLD VENIPUNCTURE: CPT

## 2021-03-07 PROCEDURE — 93005 ELECTROCARDIOGRAM TRACING: CPT

## 2021-03-07 PROCEDURE — 74011000258 HC RX REV CODE- 258: Performed by: EMERGENCY MEDICINE

## 2021-03-07 PROCEDURE — 80048 BASIC METABOLIC PNL TOTAL CA: CPT

## 2021-03-07 PROCEDURE — 99284 EMERGENCY DEPT VISIT MOD MDM: CPT

## 2021-03-07 PROCEDURE — 80307 DRUG TEST PRSMV CHEM ANLYZR: CPT

## 2021-03-07 PROCEDURE — 85610 PROTHROMBIN TIME: CPT

## 2021-03-07 PROCEDURE — 85025 COMPLETE CBC W/AUTO DIFF WBC: CPT

## 2021-03-07 PROCEDURE — 74011250636 HC RX REV CODE- 250/636: Performed by: EMERGENCY MEDICINE

## 2021-03-07 PROCEDURE — 84484 ASSAY OF TROPONIN QUANT: CPT

## 2021-03-07 RX ADMIN — AMIODARONE HYDROCHLORIDE 150 MG: 50 INJECTION, SOLUTION INTRAVENOUS at 22:00

## 2021-03-08 LAB
ATRIAL RATE: 192 BPM
CALCULATED R AXIS, ECG10: 81 DEGREES
CALCULATED T AXIS, ECG11: 29 DEGREES
DIAGNOSIS, 93000: NORMAL
Q-T INTERVAL, ECG07: 288 MS
QRS DURATION, ECG06: 100 MS
QTC CALCULATION (BEZET), ECG08: 405 MS
VENTRICULAR RATE, ECG03: 119 BPM
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
## 2021-06-11 ENCOUNTER — APPOINTMENT (OUTPATIENT)
Dept: CT IMAGING | Age: 61
End: 2021-06-11
Attending: EMERGENCY MEDICINE
Payer: COMMERCIAL

## 2021-06-11 ENCOUNTER — HOSPITAL ENCOUNTER (EMERGENCY)
Age: 61
Discharge: HOME | End: 2021-06-11
Attending: EMERGENCY MEDICINE
Payer: COMMERCIAL

## 2021-06-11 VITALS
RESPIRATION RATE: 13 BRPM | OXYGEN SATURATION: 94 % | BODY MASS INDEX: 28 KG/M2 | HEIGHT: 71 IN | WEIGHT: 200 LBS | TEMPERATURE: 97.7 F | HEART RATE: 91 BPM | DIASTOLIC BLOOD PRESSURE: 95 MMHG | SYSTOLIC BLOOD PRESSURE: 122 MMHG

## 2021-06-11 DIAGNOSIS — T40.2X1A OPIOID OVERDOSE, ACCIDENTAL OR UNINTENTIONAL, INITIAL ENCOUNTER (HCC): Primary | ICD-10-CM

## 2021-06-11 LAB
AMPHET UR QL SCN: NEGATIVE
ANION GAP SERPL CALC-SCNC: 5 MMOL/L (ref 5–15)
BARBITURATES UR QL SCN: NEGATIVE
BENZODIAZ UR QL: NEGATIVE
BUN SERPL-MCNC: 27 MG/DL (ref 6–20)
BUN/CREAT SERPL: 19 (ref 12–20)
CA-I BLD-MCNC: 9.6 MG/DL (ref 8.5–10.1)
CANNABINOIDS UR QL SCN: NEGATIVE
CHLORIDE SERPL-SCNC: 99 MMOL/L (ref 97–108)
CO2 SERPL-SCNC: 30 MMOL/L (ref 21–32)
COCAINE UR QL SCN: POSITIVE
CREAT SERPL-MCNC: 1.4 MG/DL (ref 0.7–1.3)
DRUG SCRN COMMENT,DRGCM: ABNORMAL
ERYTHROCYTE [DISTWIDTH] IN BLOOD BY AUTOMATED COUNT: 13.8 % (ref 11.5–14.5)
GLUCOSE SERPL-MCNC: 92 MG/DL (ref 65–100)
HCT VFR BLD AUTO: 53.4 % (ref 36.6–50.3)
HGB BLD-MCNC: 17.6 G/DL (ref 12.1–17)
MCH RBC QN AUTO: 30 PG (ref 26–34)
MCHC RBC AUTO-ENTMCNC: 33 G/DL (ref 30–36.5)
MCV RBC AUTO: 91 FL (ref 80–99)
METHADONE UR QL: NEGATIVE
NRBC # BLD: 0 K/UL (ref 0–0.01)
NRBC BLD-RTO: 0 PER 100 WBC
OPIATES UR QL: NEGATIVE
PCP UR QL: NEGATIVE
PLATELET # BLD AUTO: 326 K/UL (ref 150–400)
PMV BLD AUTO: 9.5 FL (ref 8.9–12.9)
POTASSIUM SERPL-SCNC: 4.3 MMOL/L (ref 3.5–5.1)
RBC # BLD AUTO: 5.87 M/UL (ref 4.1–5.7)
SODIUM SERPL-SCNC: 134 MMOL/L (ref 136–145)
WBC # BLD AUTO: 14.6 K/UL (ref 4.1–11.1)

## 2021-06-11 PROCEDURE — 80307 DRUG TEST PRSMV CHEM ANLYZR: CPT

## 2021-06-11 PROCEDURE — 93005 ELECTROCARDIOGRAM TRACING: CPT

## 2021-06-11 PROCEDURE — 36415 COLL VENOUS BLD VENIPUNCTURE: CPT

## 2021-06-11 PROCEDURE — 70450 CT HEAD/BRAIN W/O DYE: CPT

## 2021-06-11 PROCEDURE — 74011250636 HC RX REV CODE- 250/636

## 2021-06-11 PROCEDURE — 99285 EMERGENCY DEPT VISIT HI MDM: CPT

## 2021-06-11 PROCEDURE — 74011250636 HC RX REV CODE- 250/636: Performed by: EMERGENCY MEDICINE

## 2021-06-11 PROCEDURE — 85027 COMPLETE CBC AUTOMATED: CPT

## 2021-06-11 PROCEDURE — 80048 BASIC METABOLIC PNL TOTAL CA: CPT

## 2021-06-11 RX ORDER — NALOXONE HYDROCHLORIDE 0.4 MG/ML
INJECTION, SOLUTION INTRAMUSCULAR; INTRAVENOUS; SUBCUTANEOUS
Status: COMPLETED
Start: 2021-06-11 | End: 2021-06-11

## 2021-06-11 RX ORDER — NALOXONE HYDROCHLORIDE 1 MG/ML
0.4 INJECTION INTRAMUSCULAR; INTRAVENOUS; SUBCUTANEOUS
Status: DISCONTINUED | OUTPATIENT
Start: 2021-06-11 | End: 2021-06-11 | Stop reason: HOSPADM

## 2021-06-11 RX ORDER — NALOXONE HYDROCHLORIDE 4 MG/.1ML
SPRAY NASAL
Qty: 2 EACH | Refills: 0 | Status: SHIPPED | OUTPATIENT
Start: 2021-06-11 | End: 2021-09-04

## 2021-06-11 RX ADMIN — SODIUM CHLORIDE 1000 ML: 9 INJECTION, SOLUTION INTRAVENOUS at 16:19

## 2021-06-11 RX ADMIN — NALOXONE HYDROCHLORIDE 0.4 MG: 0.4 INJECTION, SOLUTION INTRAMUSCULAR; INTRAVENOUS; SUBCUTANEOUS at 15:10
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
## 2021-06-11 NOTE — ED PROVIDER NOTES
EMERGENCY DEPARTMENT HISTORY AND PHYSICAL EXAM        Date: 6/11/2021  Patient Name: Theresa Bess    History of Presenting Illness     Chief Complaint   Patient presents with    Drug Overdose     History Provided By: Patient, nurse in ER    HPI: Theresa Bess, 57 y.o. male with history of A. fib, bronchitis, hypertension, and polysubstance abuse who presents with episode of altered mental status. EMS was called for altered mental status. They given Narcan which helped resolve his altered mental status. There was concern for fentanyl use at the scene. Patient denies any drug use. He states that he has had a headache for a couple days and neck pain. He states that he has been in the heat a lot and thinks he may have overheated. States that his headache is moderate, constant, posterior, bilateral.  Denies any other symptoms. PCP: None    Current Facility-Administered Medications   Medication Dose Route Frequency Provider Last Rate Last Admin    naloxone (NARCAN) injection 0.4 mg  0.4 mg IntraVENous NOW Evelia Harrington MD        sodium chloride 0.9 % bolus infusion 1,000 mL  1,000 mL IntraVENous ONCE Amrit Borges, DO 1,000 mL/hr at 06/11/21 1619 1,000 mL at 06/11/21 1619     Current Outpatient Medications   Medication Sig Dispense Refill    naloxone (Narcan) 4 mg/actuation nasal spray Use 1 spray intranasally, then discard. Repeat with new spray every 2 min as needed for opioid overdose symptoms, alternating nostrils. 2 Each 0    amiodarone (CORDARONE) 200 mg tablet Take 1 Tab by mouth every twelve (12) hours every twelve (12) hours. 60 Tab 0    apixaban (ELIQUIS) 5 mg tablet Take 1 Tab by mouth two (2) times a day. 60 Tab 0    dilTIAZem ER (CARDIZEM CD) 120 mg capsule Take 2 Caps by mouth daily.  30 Cap 0       Past History     Past Medical History:  Past Medical History:   Diagnosis Date    A-fib ([address]     Bronchitis     Hypertension        Past Surgical History:  Reviewed and noncontributory    Family History:  History reviewed. No pertinent family history. Social History:  Social History     Tobacco Use    Smoking status: Current Every Day Smoker     Packs/day: 1.50    Smokeless tobacco: Never Used   Substance Use Topics    Alcohol use: Yes     Comment: weekly    Drug use: Yes     Types: Cocaine       Allergies:  No Known Allergies      Review of Systems   Review of Systems   Constitutional: Negative for fever. HENT: Negative for congestion. Eyes: Negative for visual disturbance. Respiratory: Negative for shortness of breath. Cardiovascular: Negative for chest pain. Gastrointestinal: Negative for abdominal pain. Genitourinary: Negative for dysuria. Musculoskeletal: Negative for arthralgias. Skin: Negative for rash. Neurological: Positive for headaches. Physical Exam   Constitutional: No acute distress. Well-nourished. Skin: No rash. ENT: No rhinorrhea. No cough. Head is normocephalic and atraumatic. Eye: No proptosis or conjunctival injections. Respiratory: No apparent respiratory distress. Gastrointestinal: Nondistended. Musculoskeletal: No obvious bony deformities. Psychiatric: Cooperative. Appropriate mood and affect.     Diagnostic Study Results     Labs -     Recent Results (from the past 24 hour(s))   METABOLIC PANEL, BASIC    Collection Time: 06/11/21  4:36 PM   Result Value Ref Range    Sodium 134 (L) 136 - 145 mmol/L    Potassium 4.3 3.5 - 5.1 mmol/L    Chloride 99 97 - 108 mmol/L    CO2 30 21 - 32 mmol/L    Anion gap 5 5 - 15 mmol/L    Glucose 92 65 - 100 mg/dL    BUN 27 (H) 6 - 20 mg/dL    Creatinine 1.40 (H) 0.70 - 1.30 mg/dL    BUN/Creatinine ratio 19 12 - 20      GFR est AA >60 >60 ml/min/1.73m2    GFR est non-AA 51 (L) >60 ml/min/1.73m2    Calcium 9.6 8.5 - 10.1 mg/dL   CBC W/O DIFF    Collection Time: 06/11/21  4:36 PM   Result Value Ref Range    WBC 14.6 (H) 4.1 - 11.1 K/uL    RBC 5.87 (H) 4.10 - 5.70 M/uL    HGB 17.6 (H) 12.1 - 17.0 g/dL    HCT 53.4 (H) 36.6 - 50.3 %    MCV 91.0 80.0 - 99.0 FL    MCH 30.0 26.0 - 34.0 PG    MCHC 33.0 30.0 - 36.5 g/dL    RDW 13.8 11.5 - 14.5 %    PLATELET 253 819 - 623 K/uL    MPV 9.5 8.9 - 12.9 FL    NRBC 0.0 0.0  WBC    ABSOLUTE NRBC 0.00 0.00 - 0.01 K/uL       Radiologic Studies -   CT HEAD WO CONT   Final Result      1. No acute intracranial hemorrhage. 2. White matter hypodensities are nonspecific, most compatible with chronic   microvascular disease in a patient this stated age. CT Results  (Last 48 hours)               06/11/21 1611  CT HEAD WO CONT Final result    Impression:      1. No acute intracranial hemorrhage. 2. White matter hypodensities are nonspecific, most compatible with chronic   microvascular disease in a patient this stated age. Narrative:      Technique: axial noncontrast images were obtained from the skull base through   the vertex. All CT scans at this facility are performed using dose reduction optimization   techniques as appropriate to a performed exam including the following: Automated   exposure control, adjustments to the MA and/or KV according to patient size or   use of iterative reconstruction technique       Comparison: None available       Findings:       Motion artifact at the skull base       There is no acute intracranial hemorrhage. There is no midline shift, mass   effect or herniation. The ventricles are symmetric, and within normal limits   for size. There is no discrete extra-axial fluid collection. Cortical grey/white differentiation is preserved. Scattered hypodensities in the   supratentorial white matter, most probably in the anterior left frontal corona   radiata        The paranasal sinuses and mastoid air cells are clear. The osseous structures   are intact. The orbits are unremarkable.                CXR Results  (Last 48 hours)    None          Medical Decision Making and ED Course     I reviewed the available vital signs, nursing notes, past medical history, past surgical history, family history, and social history. Vital Signs - Reviewed the patient's vital signs. Patient Vitals for the past 12 hrs:   Temp Pulse Resp BP SpO2   06/11/21 1651     100 %   06/11/21 1650  91 13 (!) 122/95 100 %   06/11/21 1509     98 %   06/11/21 1507     90 %   06/11/21 1505     (!) 84 %   06/11/21 1432 97.7 °F (36.5 °C) 67 (!) 67 109/70 96 %       EKG interpretation: Obtained on 6/11/2021 at 1516. Read at [id number] by myself. Atrial fibrillation at rate of 88 bpm.  WI interval indistinguishable. QRS duration 94 ms. QTc 454 ms. Normal axis. No ST segment abnormalities. Medical Decision Making:   Presented with altered mental status. Differential diagnosis includes seizure, syncope, opioid overdose, heroin overdose, polysubstance abuse, ethanol overdose. Work-up is negative. I did do CT head since patient is complaining of headache. He did require another dose of Narcan while in the ED. He was observed for multiple hours without decline. Reassured and discharged in good condition with return cautions. Procedures     Critical Care Note:   3:27 PM  Amount of critical care time: 30 minutes  Impending deterioration: CNS  Associated risk factors: Overdose  Management: Bedside Assessment and Supervision of Care  Interpretation: CT Scan  Interventions: Observation, Narcan  Case review: n/a  Treatment response: improved  Performed by: Self  Notes: I have spent critical care time involved in lab review, decision making, bedside attention, and documentation. This time excludes time spent in any separate billed procedures. During this entire length of time I was immediately available to the patient. Charmaine Guido DO    Disposition     Discharged    DISCHARGE PLAN:  1.    Current Discharge Medication List      CONTINUE these medications which have NOT CHANGED    Details   amiodarone (CORDARONE) 200 mg tablet Take 1 Tab by mouth every twelve (12) hours every twelve (12) hours. Qty: 60 Tab, Refills: 0      apixaban (ELIQUIS) 5 mg tablet Take 1 Tab by mouth two (2) times a day. Qty: 60 Tab, Refills: 0      dilTIAZem ER (CARDIZEM CD) 120 mg capsule Take 2 Caps by mouth daily. Qty: 30 Cap, Refills: 0           2. Follow-up Information     Follow up With Specialties Details Why [address] [place] [address] EMERGENCY DEPT Emergency Medicine Go today As soon as possible if symptoms worsen [address] [facility] [address] [phone]    Primary care doctor  Schedule an appointment as soon as possible for a visit in 3 days          3. Return to ED if worse     Diagnosis     Clinical impression:   1. Opioid overdose, accidental or unintentional, initial encounter [facility])           Attestation:  Please note that this dictation was completed with [employer], the computer voice recognition software. Quite often unanticipated grammatical, syntax, homophones, and other interpretive errors are inadvertently transcribed by the computer software. Please disregard these errors. Please excuse any errors that have escaped final proofreading. Thank you.   Clau Ramos, DO

## 2021-06-11 NOTE — ED NOTES
1710 This RN walked into pt room - pt was standing over bed, connected to monitor, trying to leave. I tried to convince pt to stay as his oxygen has been low, but he refused. Talked to Karlee Weeks DO, who stated if pt's RA O2 is fine he can leave. 1730 O2 sat 94% RA. Peripheral IVx2 removed. Pt ambulated A&Ox4, GCS 15 to ED lobby. In possession of personal belongings and discharge instructions.

## 2021-06-14 LAB
ATRIAL RATE: 101 BPM
CALCULATED R AXIS, ECG10: 57 DEGREES
CALCULATED T AXIS, ECG11: 169 DEGREES
DIAGNOSIS, 93000: NORMAL
Q-T INTERVAL, ECG07: 376 MS
QRS DURATION, ECG06: 94 MS
QTC CALCULATION (BEZET), ECG08: 454 MS
VENTRICULAR RATE, ECG03: 88 BPM

## 2021-09-02 ENCOUNTER — APPOINTMENT (OUTPATIENT)
Dept: GENERAL RADIOLOGY | Age: 61
End: 2021-09-02
Attending: EMERGENCY MEDICINE
Payer: COMMERCIAL

## 2021-09-02 ENCOUNTER — APPOINTMENT (OUTPATIENT)
Dept: CT IMAGING | Age: 61
End: 2021-09-02
Attending: EMERGENCY MEDICINE
Payer: COMMERCIAL

## 2021-09-02 ENCOUNTER — HOSPITAL ENCOUNTER (OUTPATIENT)
Age: 61
Setting detail: OBSERVATION
Discharge: HOME | End: 2021-09-04
Attending: EMERGENCY MEDICINE | Admitting: FAMILY MEDICINE
Payer: COMMERCIAL

## 2021-09-02 DIAGNOSIS — I63.9 CEREBROVASCULAR ACCIDENT (CVA), UNSPECIFIED MECHANISM (HCC): Primary | ICD-10-CM

## 2021-09-02 LAB
ALBUMIN SERPL-MCNC: 3.9 G/DL (ref 3.5–5)
ALBUMIN/GLOB SERPL: 1.1 {RATIO} (ref 1.1–2.2)
ALP SERPL-CCNC: 76 U/L (ref 45–117)
ALT SERPL-CCNC: 17 U/L (ref 12–78)
ANION GAP SERPL CALC-SCNC: 5 MMOL/L (ref 5–15)
APTT PPP: 31.2 SEC (ref 21.2–34.1)
AST SERPL W P-5'-P-CCNC: 13 U/L (ref 15–37)
ATRIAL RATE: 97 BPM
BASOPHILS # BLD: 0 K/UL (ref 0–0.1)
BASOPHILS NFR BLD: 0 % (ref 0–1)
BILIRUB SERPL-MCNC: 0.7 MG/DL (ref 0.2–1)
BUN SERPL-MCNC: 14 MG/DL (ref 6–20)
BUN/CREAT SERPL: 15 (ref 12–20)
CA-I BLD-MCNC: 9.2 MG/DL (ref 8.5–10.1)
CALCULATED R AXIS, ECG10: 84 DEGREES
CALCULATED T AXIS, ECG11: 67 DEGREES
CHLORIDE SERPL-SCNC: 103 MMOL/L (ref 97–108)
CO2 SERPL-SCNC: 25 MMOL/L (ref 21–32)
CREAT SERPL-MCNC: 0.93 MG/DL (ref 0.7–1.3)
DIAGNOSIS, 93000: NORMAL
DIFFERENTIAL METHOD BLD: NORMAL
EOSINOPHIL # BLD: 0.2 K/UL (ref 0–0.4)
EOSINOPHIL NFR BLD: 2 % (ref 0–7)
ERYTHROCYTE [DISTWIDTH] IN BLOOD BY AUTOMATED COUNT: 14.1 % (ref 11.5–14.5)
GLOBULIN SER CALC-MCNC: 3.7 G/DL (ref 2–4)
GLUCOSE SERPL-MCNC: 128 MG/DL (ref 65–100)
HCT VFR BLD AUTO: 49.2 % (ref 36.6–50.3)
HGB BLD-MCNC: 16.8 G/DL (ref 12.1–17)
IMM GRANULOCYTES # BLD AUTO: 0 K/UL (ref 0–0.04)
IMM GRANULOCYTES NFR BLD AUTO: 0 % (ref 0–0.5)
INR PPP: 1 (ref 0.9–1.1)
LYMPHOCYTES # BLD: 2.8 K/UL (ref 0.8–3.5)
LYMPHOCYTES NFR BLD: 29 % (ref 12–49)
MCH RBC QN AUTO: 30.7 PG (ref 26–34)
MCHC RBC AUTO-ENTMCNC: 34.1 G/DL (ref 30–36.5)
MCV RBC AUTO: 89.9 FL (ref 80–99)
MONOCYTES # BLD: 0.8 K/UL (ref 0–1)
MONOCYTES NFR BLD: 8 % (ref 5–13)
NEUTS SEG # BLD: 6 K/UL (ref 1.8–8)
NEUTS SEG NFR BLD: 61 % (ref 32–75)
NRBC # BLD: 0 K/UL (ref 0–0.01)
NRBC BLD-RTO: 0 PER 100 WBC
PLATELET # BLD AUTO: 263 K/UL (ref 150–400)
PMV BLD AUTO: 10.6 FL (ref 8.9–12.9)
POTASSIUM SERPL-SCNC: 3.9 MMOL/L (ref 3.5–5.1)
PROT SERPL-MCNC: 7.6 G/DL (ref 6.4–8.2)
PROTHROMBIN TIME: 13.4 SEC (ref 11.9–14.7)
Q-T INTERVAL, ECG07: 348 MS
QRS DURATION, ECG06: 94 MS
QTC CALCULATION (BEZET), ECG08: 457 MS
RBC # BLD AUTO: 5.47 M/UL (ref 4.1–5.7)
SODIUM SERPL-SCNC: 133 MMOL/L (ref 136–145)
THERAPEUTIC RANGE,PTTT: NORMAL SEC (ref 82–109)
TROPONIN I SERPL-MCNC: <0.05 NG/ML
VENTRICULAR RATE, ECG03: 104 BPM
WBC # BLD AUTO: 9.8 K/UL (ref 4.1–11.1)

## 2021-09-02 PROCEDURE — 74011250636 HC RX REV CODE- 250/636: Performed by: FAMILY MEDICINE

## 2021-09-02 PROCEDURE — 80053 COMPREHEN METABOLIC PANEL: CPT

## 2021-09-02 PROCEDURE — 70450 CT HEAD/BRAIN W/O DYE: CPT

## 2021-09-02 PROCEDURE — 36415 COLL VENOUS BLD VENIPUNCTURE: CPT

## 2021-09-02 PROCEDURE — 74011000636 HC RX REV CODE- 636: Performed by: EMERGENCY MEDICINE

## 2021-09-02 PROCEDURE — 99284 EMERGENCY DEPT VISIT MOD MDM: CPT

## 2021-09-02 PROCEDURE — 85730 THROMBOPLASTIN TIME PARTIAL: CPT

## 2021-09-02 PROCEDURE — 74011250637 HC RX REV CODE- 250/637: Performed by: EMERGENCY MEDICINE

## 2021-09-02 PROCEDURE — 93005 ELECTROCARDIOGRAM TRACING: CPT

## 2021-09-02 PROCEDURE — 85610 PROTHROMBIN TIME: CPT

## 2021-09-02 PROCEDURE — 84484 ASSAY OF TROPONIN QUANT: CPT

## 2021-09-02 PROCEDURE — 71045 X-RAY EXAM CHEST 1 VIEW: CPT

## 2021-09-02 PROCEDURE — 74011250637 HC RX REV CODE- 250/637: Performed by: FAMILY MEDICINE

## 2021-09-02 PROCEDURE — 70496 CT ANGIOGRAPHY HEAD: CPT

## 2021-09-02 PROCEDURE — 65270000029 HC RM PRIVATE

## 2021-09-02 PROCEDURE — G0378 HOSPITAL OBSERVATION PER HR: HCPCS

## 2021-09-02 PROCEDURE — 85025 COMPLETE CBC W/AUTO DIFF WBC: CPT

## 2021-09-02 RX ORDER — DILTIAZEM HYDROCHLORIDE 120 MG/1
240 CAPSULE, COATED, EXTENDED RELEASE ORAL DAILY
Status: DISCONTINUED | OUTPATIENT
Start: 2021-09-03 | End: 2021-09-04 | Stop reason: HOSPADM

## 2021-09-02 RX ORDER — AMIODARONE HYDROCHLORIDE 200 MG/1
200 TABLET ORAL EVERY 12 HOURS
Status: DISCONTINUED | OUTPATIENT
Start: 2021-09-02 | End: 2021-09-04 | Stop reason: HOSPADM

## 2021-09-02 RX ORDER — ATORVASTATIN CALCIUM 40 MG/1
40 TABLET, FILM COATED ORAL
Status: DISCONTINUED | OUTPATIENT
Start: 2021-09-02 | End: 2021-09-04 | Stop reason: HOSPADM

## 2021-09-02 RX ORDER — ASPIRIN 325 MG
325 TABLET ORAL
Status: COMPLETED | OUTPATIENT
Start: 2021-09-02 | End: 2021-09-02

## 2021-09-02 RX ORDER — ACETAMINOPHEN 650 MG/1
650 SUPPOSITORY RECTAL
Status: DISCONTINUED | OUTPATIENT
Start: 2021-09-02 | End: 2021-09-04 | Stop reason: HOSPADM

## 2021-09-02 RX ORDER — ASPIRIN 325 MG
325 TABLET ORAL DAILY
Status: DISCONTINUED | OUTPATIENT
Start: 2021-09-03 | End: 2021-09-04 | Stop reason: HOSPADM

## 2021-09-02 RX ORDER — ONDANSETRON 4 MG/1
4 TABLET, ORALLY DISINTEGRATING ORAL
Status: DISCONTINUED | OUTPATIENT
Start: 2021-09-02 | End: 2021-09-04 | Stop reason: HOSPADM

## 2021-09-02 RX ORDER — ACETAMINOPHEN 325 MG/1
650 TABLET ORAL
Status: DISCONTINUED | OUTPATIENT
Start: 2021-09-02 | End: 2021-09-04 | Stop reason: HOSPADM

## 2021-09-02 RX ORDER — SODIUM CHLORIDE 9 MG/ML
75 INJECTION, SOLUTION INTRAVENOUS CONTINUOUS
Status: DISCONTINUED | OUTPATIENT
Start: 2021-09-02 | End: 2021-09-04

## 2021-09-02 RX ORDER — POLYETHYLENE GLYCOL 3350 17 G/17G
17 POWDER, FOR SOLUTION ORAL DAILY PRN
Status: DISCONTINUED | OUTPATIENT
Start: 2021-09-02 | End: 2021-09-04 | Stop reason: HOSPADM

## 2021-09-02 RX ORDER — ONDANSETRON 2 MG/ML
4 INJECTION INTRAMUSCULAR; INTRAVENOUS
Status: DISCONTINUED | OUTPATIENT
Start: 2021-09-02 | End: 2021-09-04 | Stop reason: HOSPADM

## 2021-09-02 RX ADMIN — ASPIRIN 325 MG ORAL TABLET 325 MG: 325 PILL ORAL at 17:17

## 2021-09-02 RX ADMIN — SODIUM CHLORIDE 75 ML/HR: 9 INJECTION, SOLUTION INTRAVENOUS at 23:38

## 2021-09-02 RX ADMIN — IOPAMIDOL 100 ML: 755 INJECTION, SOLUTION INTRAVENOUS at 12:34

## 2021-09-02 RX ADMIN — ATORVASTATIN CALCIUM 40 MG: 40 TABLET, FILM COATED ORAL at 22:59

## 2021-09-02 RX ADMIN — AMIODARONE HYDROCHLORIDE 200 MG: 200 TABLET ORAL at 22:59

## 2021-09-02 RX ADMIN — APIXABAN 5 MG: 5 TABLET, FILM COATED ORAL at 22:59

## 2021-09-02 NOTE — Clinical Note
Status[de-identified] INPATIENT [101]   Type of Bed: Telemetry [19]   Cardiac Monitoring Required?: Yes   Inpatient Hospitalization Certified Necessary for the Following Reasons: 3.  Patient receiving treatment that can only be provided in an inpatient setting (further clarification in H&P documentation)   Admitting Diagnosis: CVA (cerebral vascular accident) [facility]) [[id number]]   Admitting Physician: Jaclyn Roy   Attending Physician: Milagro Elizondo [[id number]]   Estimated Length of Stay: 2 Midnights   Discharge Plan[de-identified] Home with Office Follow-up

## 2021-09-02 NOTE — ACP (ADVANCE CARE PLANNING)
Advance Care Planning   Healthcare Decision Maker:       Primary Decision Maker: Demetrio Pham - Daughter - [phone]

## 2021-09-02 NOTE — ED TRIAGE NOTES
Left side leg and arm weakness with numbness and tingling onset last night . Pt state he had a ha as well.

## 2021-09-02 NOTE — H&P
History and Physical    NAME: Britton Velarde   :  1960   MRN:  [id number]     Date/Time:  2021 7:47 PM    Patient PCP: None  ______________________________________________________________________             Subjective:     CHIEF COMPLAINT:     Headache and left-sided weakness    HISTORY OF PRESENT ILLNESS:       Patient is a 57y.o. year old male with signal past medical history of chronic A. fib hypertension came to emergency room complaining of left-sided weakness left facial numbness unsteady gait for last few days patient have slurred speech this morning smoke 1 pack for 4 days came to emergency room seen by the ER physician and patient was recommend to be admitted for further evaluation and treatment of possible stroke patient on Eliquis for A. fib due to delay in mail order unable to take Eliquis for last 5 days    Past Medical History:   Diagnosis Date    A-fib ([facility] [address]     Bronchitis     Hypertension         No past surgical history on file. Social History     Tobacco Use    Smoking status: Current Every Day Smoker     Packs/day: 1.50    Smokeless tobacco: Never Used   Substance Use Topics    Alcohol use: Yes     Comment: weekly        No family history on file. No Known Allergies     Prior to Admission medications    Medication Sig Start Date End Date Taking? Authorizing Provider   naloxone (Narcan) 4 mg/actuation nasal spray Use 1 spray intranasally, then discard. Repeat with new spray every 2 min as needed for opioid overdose symptoms, alternating nostrils. 21   Elida Hernandez C,    amiodarone (CORDARONE) 200 mg tablet Take 1 Tab by mouth every twelve (12) hours every twelve (12) hours. 20   Claudetta Reins, MD   apixaban (ELIQUIS) 5 mg tablet Take 1 Tab by mouth two (2) times a day. 20   Claudetta Reins, MD   dilTIAZem ER (CARDIZEM CD) 120 mg capsule Take 2 Caps by mouth daily.  20   Claudetta Reins, MD         Current Facility-Administered Medications:     amiodarone (CORDARONE) tablet 200 mg, 200 mg, Oral, Q12H, Heike Mendoza MD    apixaban (ELIQUIS) tablet 5 mg, 5 mg, Oral, BID, Heike Mendoza MD  [place]  [START ON 9/3/2021] dilTIAZem ER (CARDIZEM CD) capsule 240 mg, 240 mg, Oral, DAILY, Heike Mendoza MD    0.9% sodium chloride infusion, 75 mL/hr, IntraVENous, CONTINUOUS, Heike Mendoza MD    acetaminophen (TYLENOL) tablet 650 mg, 650 mg, Oral, Q6H PRN **OR** acetaminophen (TYLENOL) suppository 650 mg, 650 mg, Rectal, Q6H PRN, Heike Mendoza MD    polyethylene glycol (MIRALAX) packet 17 g, 17 g, Oral, DAILY PRN, Heike Mendoza MD    ondansetron (ZOFRAN ODT) tablet 4 mg, 4 mg, Oral, Q8H PRN **OR** ondansetron (ZOFRAN) injection 4 mg, 4 mg, IntraVENous, Q6H PRN, Rosmery Mendoza MD    [START ON 9/3/2021] aspirin tablet 325 mg, 325 mg, Oral, DAILY, Rosmery Mendoza MD    atorvastatin (LIPITOR) tablet 40 mg, 40 mg, Oral, QHS, Rosmery Mendoza MD    Current Outpatient Medications:     naloxone (Narcan) 4 mg/actuation nasal spray, Use 1 spray intranasally, then discard. Repeat with new spray every 2 min as needed for opioid overdose symptoms, alternating nostrils. , Disp: 2 Each, Rfl: 0    amiodarone (CORDARONE) 200 mg tablet, Take 1 Tab by mouth every twelve (12) hours every twelve (12) hours. , Disp: 60 Tab, Rfl: 0    apixaban (ELIQUIS) 5 mg tablet, Take 1 Tab by mouth two (2) times a day., Disp: 60 Tab, Rfl: 0    dilTIAZem ER (CARDIZEM CD) 120 mg capsule, Take 2 Caps by mouth daily. , Disp: 30 Cap, Rfl: 0    LAB DATA REVIEWED:    Recent Results (from the past 24 hour(s))   CBC WITH AUTOMATED DIFF    Collection Time: 09/02/21  1:00 PM   Result Value Ref Range    WBC 9.8 4.1 - 11.1 K/uL    RBC 5.47 4.10 - 5.70 M/uL    HGB 16.8 12.1 - 17.0 g/dL    HCT 49.2 36.6 - 50.3 %    MCV 89.9 80.0 - 99.0 FL    MCH 30.7 26.0 - 34.0 PG    MCHC 34.1 30.0 - 36.5 g/dL    RDW 14.1 11.5 - 14.5 %    PLATELET 656 707 - 694 K/uL    MPV 10.6 8.9 - 12.9 FL    NRBC 0.0 0.0  WBC    ABSOLUTE NRBC 0.00 0.00 - 0.01 K/uL    NEUTROPHILS 61 32 - 75 %    LYMPHOCYTES 29 12 - 49 %    MONOCYTES 8 5 - 13 %    EOSINOPHILS 2 0 - 7 %    BASOPHILS 0 0 - 1 %    IMMATURE GRANULOCYTES 0 0 - 0.5 %    ABS. NEUTROPHILS 6.0 1.8 - 8.0 K/UL    ABS. LYMPHOCYTES 2.8 0.8 - 3.5 K/UL    ABS. MONOCYTES 0.8 0.0 - 1.0 K/UL    ABS. EOSINOPHILS 0.2 0.0 - 0.4 K/UL    ABS. BASOPHILS 0.0 0.0 - 0.1 K/UL    ABS. IMM. GRANS. 0.0 0.00 - 0.04 K/UL    DF AUTOMATED     METABOLIC PANEL, COMPREHENSIVE    Collection Time: 09/02/21  1:00 PM   Result Value Ref Range    Sodium 133 (L) 136 - 145 mmol/L    Potassium 3.9 3.5 - 5.1 mmol/L    Chloride 103 97 - 108 mmol/L    CO2 25 21 - 32 mmol/L    Anion gap 5 5 - 15 mmol/L    Glucose 128 (H) 65 - 100 mg/dL    BUN 14 6 - 20 mg/dL    Creatinine 0.93 0.70 - 1.30 mg/dL    BUN/Creatinine ratio 15 12 - 20      GFR est AA >60 >60 ml/min/1.73m2    GFR est non-AA >60 >60 ml/min/1.73m2    Calcium 9.2 8.5 - 10.1 mg/dL    Bilirubin, total 0.7 0.2 - 1.0 mg/dL    AST (SGOT) 13 (L) 15 - 37 U/L    ALT (SGPT) 17 12 - 78 U/L    Alk.  phosphatase 76 45 - 117 U/L    Protein, total 7.6 6.4 - 8.2 g/dL    Albumin 3.9 3.5 - 5.0 g/dL    Globulin 3.7 2.0 - 4.0 g/dL    A-G Ratio 1.1 1.1 - 2.2     PROTHROMBIN TIME + INR    Collection Time: 09/02/21  1:00 PM   Result Value Ref Range    Prothrombin time 13.4 11.9 - 14.7 sec    INR 1.0 0.9 - 1.1     TROPONIN I    Collection Time: 09/02/21  1:00 PM   Result Value Ref Range    Troponin-I, Qt. <0.05 <0.05 ng/mL   PTT    Collection Time: 09/02/21  1:00 PM   Result Value Ref Range    aPTT 31.2 21.2 - 34.1 sec    aPTT, therapeutic range   82 - 109 sec   EKG, 12 LEAD, INITIAL    Collection Time: 09/02/21  5:12 PM   Result Value Ref Range    Ventricular Rate 104 BPM    Atrial Rate 97 BPM    QRS Duration 94 ms    Q-T Interval 348 ms    QTC Calculation (Bezet) 457 ms    Calculated R Axis 84 degrees    Calculated T Axis 67 degrees    Diagnosis       Afib with moderate VR  Nonspecific ST and T wave abnormality  Abnormal ECG    Confirmed by Yudy Hernandez MD, [facility] ([id number]) on 9/2/2021 6:32:44 PM         XR Results (most recent):  Results from [facility] encounter on 09/02/21    XR CHEST SNGL V    Narrative  Shortness of breath. Comparison chest x-ray 11/2/2020. Findings: Single frontal view of the chest. Normal cardiomediastinal silhouette. No vascular congestion or pulmonary edema. The lungs are well-inflated. No  infiltrate, effusion, pneumothorax. No free air under the hemidiaphragms. Bones  grossly intact. Impression  No acute findings. XR CHEST SNGL V   Final Result   No acute findings. CT CODE NEURO HEAD WO CONTRAST   Final Result   Evidence for degree nonacute end vessel occlusive change. No gross intracranial hemorrhage as above. Study findings called to Dr. Gordon De Anda 12:45 PM 9/20/2021. CTA CODE NEURO HEAD AND NECK W CONT   Final Result   1. Normal head CT examination. No evidence for aneurysms, focal stenoses or   branch occlusions. Neck CTA         Technique: 3 mm noncontrasted images were obtained through the entire neck. Then   1.5mm axial images with nonionic intravenous contrast were obtained. 2-D and 3-D   reformats of head and neck CT angiographic data performed. Comparisons: None. Findings: A standard arch is present. No significant stenosis of the origin of   the great vessels evident. Right carotid artery: No stenoses of the right common carotid artery evident. There is no stenosis of the origin of the right internal carotid artery by   NASCET criteria. Left carotid artery: No stenoses of the left common carotid artery evident. There is no stenosis of the origin of the left internal carotid artery by NASCET   criteria. The vertebral arteries are codominant without focal stenosis. IMPRESSION:    1.  No stenosis of the origin of the right internal carotid artery by NASCET   criteria. 2. No stenosis of the origin of the left internal carotid artery by NASCET   criteria. DUPLEX CAROTID BILATERAL    (Results Pending)   MRI BRAIN W WO CONT    (Results Pending)        Review of Systems:  Constitutional: Negative for chills and fever. HENT: Negative. Eyes: Negative. Respiratory: Negative. Cardiovascular: Negative. Gastrointestinal: Negative for abdominal pain and nausea. Skin: Negative. Neurological: Negative. Objective:   VITALS:    Visit Vitals  BP (!) 165/106   Pulse 100   Temp 97.4 °F (36.3 °C)   Resp 18   Ht 5' 11\" (1.803 m)   Wt 90.7 kg (200 lb)   SpO2 99%   BMI 27.89 kg/m²       Physical Exam:   Constitutional: pt is oriented to person, place, and time. HENT:   Head: Normocephalic and atraumatic. Eyes: Pupils are equal, round, and reactive to light. EOM are normal.   Cardiovascular: Irregularly irregular  Pulmonary/Chest: Breath sounds normal. No wheezes. No rales. Exhibits no tenderness. Abdominal: Soft. Bowel sounds are normal. There is no abdominal tenderness. There is no rebound and no guarding. Musculoskeletal: Normal range of motion. Neurological: pt is alert and oriented to person, place, and time. Alert. Normal strength. No cranial nerve deficit or sensory deficit. Displays a negative Romberg sign. ASSESSMENT & PLAN:    CVA  Chronic A.  Fib  Hypertension  Ongoing smoking      Current Facility-Administered Medications:     amiodarone (CORDARONE) tablet 200 mg, 200 mg, Oral, Q12H, Rosmery Mendoza MD    apixaban (ELIQUIS) tablet 5 mg, 5 mg, Oral, BID, Yury Mendoza MD  Lety Larger  [START ON 9/3/2021] dilTIAZem ER (CARDIZEM CD) capsule 240 mg, 240 mg, Oral, DAILY, Rosmery Mendoza MD    0.9% sodium chloride infusion, 75 mL/hr, IntraVENous, CONTINUOUS, Rosmery Mendoza MD    acetaminophen (TYLENOL) tablet 650 mg, 650 mg, Oral, Q6H PRN **OR** acetaminophen (TYLENOL) suppository 650 mg, 650 mg, Rectal, Q6H PRN, Mohiuddin, Laban Mcardle, MD    polyethylene glycol (MIRALAX) packet 17 g, 17 g, Oral, DAILY PRN, Mohiuddin, Laban Mcardle, MD    ondansetron (ZOFRAN ODT) tablet 4 mg, 4 mg, Oral, Q8H PRN **OR** ondansetron (ZOFRAN) injection 4 mg, 4 mg, IntraVENous, Q6H PRN, Rosmery Mendoza MD    [START ON 9/3/2021] aspirin tablet 325 mg, 325 mg, Oral, DAILY, Rosmery Mendoza MD    atorvastatin (LIPITOR) tablet 40 mg, 40 mg, Oral, QHS, Rosmery Mendoza MD    Current Outpatient Medications:     naloxone (Narcan) 4 mg/actuation nasal spray, Use 1 spray intranasally, then discard. Repeat with new spray every 2 min as needed for opioid overdose symptoms, alternating nostrils. , Disp: 2 Each, Rfl: 0    amiodarone (CORDARONE) 200 mg tablet, Take 1 Tab by mouth every twelve (12) hours every twelve (12) hours. , Disp: 60 Tab, Rfl: 0    apixaban (ELIQUIS) 5 mg tablet, Take 1 Tab by mouth two (2) times a day., Disp: 60 Tab, Rfl: 0    dilTIAZem ER (CARDIZEM CD) 120 mg capsule, Take 2 Caps by mouth daily. , Disp: 30 Cap, Rfl: 0    Patient admitted to telemetry floor continue home medication  Start on aspirin and statin  2D echo carotid Doppler  Neurology consult  PT OT speech therapy consult  Neuro check  ________________________________________________________________________    Signed: Dillon Dunbar MD

## 2021-09-02 NOTE — PROGRESS NOTES
Spiritual Care Assessment/Progress Note  [facility]      NAME: Shalonda Reynolds      MRN: [id number]  AGE: 57 y.o. SEX: male  Quaker Affiliation: No preference   Language: English     9/2/2021     Total Time (in minutes): 20     Spiritual Assessment begun in [facility] EMERGENCY DEPT through conversation with:         [x]Patient        [x] Family    [] Friend(s)        Reason for Consult:  Other (comment) (STROKE ALERT)     Spiritual beliefs: (Please include comment if needed)     [x] Identifies with a stefani tradition:         [] Supported by a stefani community:            [] Claims no spiritual orientation:           [] Seeking spiritual identity:                [x] Adheres to an individual form of spirituality:           [] Not able to assess:                           Identified resources for coping:      [] Prayer                               [] Music                  [] Guided Imagery     [x] Family/friends                 [] Pet visits     [] Devotional reading                         [] Unknown     [] Other:                                             Interventions offered during this visit: (See comments for more details)    Patient Interventions: Affirmation of emotions/emotional suffering, Affirmation of stefani, Catharsis/review of pertinent events in supportive environment, Coping skills reviewed/reinforced, Initial visit, Other (comment)           Plan of Care:     [] Support spiritual and/or cultural needs    [] Support AMD and/or advance care planning process      [] Support grieving process   [] Coordinate Rites and/or Rituals    [] Coordination with community clergy   [] No spiritual needs identified at this time   [] Detailed Plan of Care below (See Comments)  [] Make referral to Music Therapy  [] Make referral to Pet Therapy     [] Make referral to Addiction services  [] Make referral to [facility]  [] Make referral to Spiritual Care Partner  [] No future visits requested [x] Follow up upon further referrals     Comments: The purpose of the visit was in response to a [address] on the patient. The patient expressed appreciation for the visit. He mentioned that he appreciates his wife who was with him during the visit. He expressed having a relationship with God and how he valued the presence of the . The  provided the ministry of presence and the comfort of spiritual care to the patient and on the unit. [address] [facility] RICKY Baez.Div.    can be reached by calling the  at [facility]  [phone]

## 2021-09-02 NOTE — ED PROVIDER NOTES
EMERGENCY DEPARTMENT HISTORY AND PHYSICAL EXAM      Date: 9/2/2021  Patient Name: Brannon Jewell    History of Presenting Illness     Chief Complaint   Patient presents with    Extremity Weakness       History Provided By: Patient    HPI: Brannon Jewell, 57 y.o. male with a past medical history significant hypertension and afib presents to the ED with chief complaint of Extremity Weakness  . 71-year-old male history of atrial fibrillation. Is on a blood thinner. He gets it from the mail order but had not shown up in 4 days in the mail. So he has been off of it for 4 days. Eliquis. Patient developed left leg weakness left arm left arm tingling yesterday before he went to bed noticed at about 12. Is also having some dizziness now. No head injury no falls or trauma. Presents as a level 2 stroke alert. Also denies any chest pain or shortness of breath. There are no other complaints, changes, or physical findings at this time. PCP: None    Current Facility-Administered Medications   Medication Dose Route Frequency Provider Last Rate Last Admin    aspirin tablet 325 mg  325 mg Oral NOW Hayde Frausto MD         Current Outpatient Medications   Medication Sig Dispense Refill    naloxone (Narcan) 4 mg/actuation nasal spray Use 1 spray intranasally, then discard. Repeat with new spray every 2 min as needed for opioid overdose symptoms, alternating nostrils. 2 Each 0    amiodarone (CORDARONE) 200 mg tablet Take 1 Tab by mouth every twelve (12) hours every twelve (12) hours. 60 Tab 0    apixaban (ELIQUIS) 5 mg tablet Take 1 Tab by mouth two (2) times a day. 60 Tab 0    dilTIAZem ER (CARDIZEM CD) 120 mg capsule Take 2 Caps by mouth daily. 30 Cap 0       Past History     Past Medical History:  Past Medical History:   Diagnosis Date    A-fib ([facility] [address]     Bronchitis     Hypertension        Past Surgical History:  No past surgical history on file.     Family History:  No family history on file.    Social History:  Social History     Tobacco Use    Smoking status: Current Every Day Smoker     Packs/day: 1.50    Smokeless tobacco: Never Used   Substance Use Topics    Alcohol use: Yes     Comment: weekly    Drug use: Yes     Types: Cocaine       Allergies:  No Known Allergies      Review of Systems   Review of Systems   Constitutional: Negative. Negative for chills, fatigue and fever. HENT: Negative. Negative for congestion, ear pain, nosebleeds and sore throat. Eyes: Negative. Negative for pain, discharge and visual disturbance. Respiratory: Negative. Negative for cough, chest tightness and shortness of breath. Cardiovascular: Negative. Negative for chest pain and leg swelling. Gastrointestinal: Negative. Negative for abdominal pain, blood in stool, constipation, diarrhea, nausea and vomiting. Endocrine: Negative. Genitourinary: Negative. Negative for difficulty urinating, dysuria and flank pain. Musculoskeletal: Negative. Negative for back pain and myalgias. Skin: Negative. Negative for rash and wound. Allergic/Immunologic: Negative. Neurological: Positive for weakness and numbness. Negative for dizziness, syncope and headaches. Hematological: Negative. Does not bruise/bleed easily. Psychiatric/Behavioral: Negative. Negative for agitation, confusion, hallucinations and suicidal ideas. All other systems reviewed and are negative. Physical Exam   Physical Exam  Vitals and nursing note reviewed. Constitutional:       General: He is not in acute distress. Appearance: He is normal weight. He is not ill-appearing. HENT:      Head: Normocephalic and atraumatic. Right Ear: External ear normal.      Left Ear: External ear normal.      Nose: Nose normal. No rhinorrhea. Mouth/Throat:      Mouth: Mucous membranes are moist.      Pharynx: Oropharynx is clear. Eyes:      Extraocular Movements: Extraocular movements intact. Conjunctiva/sclera: Conjunctivae normal.      Pupils: Pupils are equal, round, and reactive to light. Cardiovascular:      Rate and Rhythm: Normal rate and regular rhythm. Pulses: Normal pulses. Heart sounds: Normal heart sounds. Pulmonary:      Effort: Pulmonary effort is normal. No respiratory distress. Breath sounds: Normal breath sounds. Abdominal:      General: Abdomen is flat. Bowel sounds are normal.      Palpations: Abdomen is soft. Musculoskeletal:         General: No tenderness or deformity. Normal range of motion. Cervical back: Normal range of motion and neck supple. Skin:     General: Skin is warm and dry. Capillary Refill: Capillary refill takes less than 2 seconds. Findings: No bruising, lesion or rash. Neurological:      General: No focal deficit present. Mental Status: He is alert and oriented to person, place, and time. Mental status is at baseline. Psychiatric:         Mood and Affect: Mood normal.         Behavior: Behavior normal.         Thought Content: Thought content normal.         Judgment: Judgment normal.         Diagnostic Study Results     Labs -     Recent Results (from the past 12 hour(s))   CBC WITH AUTOMATED DIFF    Collection Time: 09/02/21  1:00 PM   Result Value Ref Range    WBC 9.8 4.1 - 11.1 K/uL    RBC 5.47 4.10 - 5.70 M/uL    HGB 16.8 12.1 - 17.0 g/dL    HCT 49.2 36.6 - 50.3 %    MCV 89.9 80.0 - 99.0 FL    MCH 30.7 26.0 - 34.0 PG    MCHC 34.1 30.0 - 36.5 g/dL    RDW 14.1 11.5 - 14.5 %    PLATELET 844 406 - 445 K/uL    MPV 10.6 8.9 - 12.9 FL    NRBC 0.0 0.0  WBC    ABSOLUTE NRBC 0.00 0.00 - 0.01 K/uL    NEUTROPHILS 61 32 - 75 %    LYMPHOCYTES 29 12 - 49 %    MONOCYTES 8 5 - 13 %    EOSINOPHILS 2 0 - 7 %    BASOPHILS 0 0 - 1 %    IMMATURE GRANULOCYTES 0 0 - 0.5 %    ABS. NEUTROPHILS 6.0 1.8 - 8.0 K/UL    ABS. LYMPHOCYTES 2.8 0.8 - 3.5 K/UL    ABS. MONOCYTES 0.8 0.0 - 1.0 K/UL    ABS. EOSINOPHILS 0.2 0.0 - 0.4 K/UL    ABS. BASOPHILS 0.0 0.0 - 0.1 K/UL    ABS. IMM. GRANS. 0.0 0.00 - 0.04 K/UL    DF AUTOMATED     METABOLIC PANEL, COMPREHENSIVE    Collection Time: 09/02/21  1:00 PM   Result Value Ref Range    Sodium 133 (L) 136 - 145 mmol/L    Potassium 3.9 3.5 - 5.1 mmol/L    Chloride 103 97 - 108 mmol/L    CO2 25 21 - 32 mmol/L    Anion gap 5 5 - 15 mmol/L    Glucose 128 (H) 65 - 100 mg/dL    BUN 14 6 - 20 mg/dL    Creatinine 0.93 0.70 - 1.30 mg/dL    BUN/Creatinine ratio 15 12 - 20      GFR est AA >60 >60 ml/min/1.73m2    GFR est non-AA >60 >60 ml/min/1.73m2    Calcium 9.2 8.5 - 10.1 mg/dL    Bilirubin, total 0.7 0.2 - 1.0 mg/dL    AST (SGOT) 13 (L) 15 - 37 U/L    ALT (SGPT) 17 12 - 78 U/L    Alk. phosphatase 76 45 - 117 U/L    Protein, total 7.6 6.4 - 8.2 g/dL    Albumin 3.9 3.5 - 5.0 g/dL    Globulin 3.7 2.0 - 4.0 g/dL    A-G Ratio 1.1 1.1 - 2.2     PROTHROMBIN TIME + INR    Collection Time: 09/02/21  1:00 PM   Result Value Ref Range    Prothrombin time 13.4 11.9 - 14.7 sec    INR 1.0 0.9 - 1.1     TROPONIN I    Collection Time: 09/02/21  1:00 PM   Result Value Ref Range    Troponin-I, Qt. <0.05 <0.05 ng/mL   PTT    Collection Time: 09/02/21  1:00 PM   Result Value Ref Range    aPTT 31.2 21.2 - 34.1 sec    aPTT, therapeutic range   82 - 109 sec       Radiologic Studies -   CT CODE NEURO HEAD WO CONTRAST   Final Result   Evidence for degree nonacute end vessel occlusive change. No gross intracranial hemorrhage as above. Study findings called to Dr. Kathy Clancy 12:45 PM 9/20/2021. CTA CODE NEURO HEAD AND NECK W CONT    (Results Pending)   XR CHEST SNGL V    (Results Pending)     CT Results  (Last 48 hours)               09/02/21 1238  CT CODE NEURO HEAD WO CONTRAST Final result    Impression:  Evidence for degree nonacute end vessel occlusive change. No gross intracranial hemorrhage as above. Study findings called to Dr. Kathy Clancy 12:45 PM 9/20/2021. Narrative:  CT head.        Axial images are reviewed along with reformatted sagittal/coronal images. Dose reduction: All CT scans at this facility are performed using dose reduction   optimization techniques as appropriate to a performed exam including the   following-   automated exposure control, adjustments of mA and/or Kv according to patient   size, or use of iterative reconstructive technique. Bone windows demonstrate normal aeration sinuses and mastoid air cells. Review of intracranial content reveals degree periventricular/subcortical white   matter gliosis, evidence to suggest nonacute end vessel occlusive change. Suspect small amount of IV contrast through central vessels of undetermined   origin on this labeled nonenhanced CT head. This may obscure small content   intracranial bleed. No gross intracranial hemorrhage. No hydrocephalus. CXR Results  (Last 48 hours)    None          Medical Decision Making and ED Course   I am the first provider for this patient. I reviewed the vital signs, available nursing notes, past medical history, past surgical history, family history and social history. Vital Signs-Reviewed the patient's vital signs. Patient Vitals for the past 12 hrs:   Temp Pulse Resp BP SpO2   09/02/21 1217 97.4 °F (36.3 °C) 100 18 (!) 165/106 99 %       EKG interpretation:         Records Reviewed: Previous Hospital chart. EMS run report      ED Course:   Initial assessment performed. The patients presenting problems have been discussed, and they are in agreement with the care plan formulated and outlined with them. I have encouraged them to ask questions as they arise throughout their visit.     Orders Placed This Encounter    CT CODE NEURO HEAD WO CONTRAST     Standing Status:   Standing     Number of Occurrences:   1     Order Specific Question:   Transport     Answer:   Wheelchair [7]     Order Specific Question:   Reason for Exam     Answer:   stroke alert     Order Specific Question: Decision Support Exception     Answer:   Emergency Medical Condition (MA) [1]    CTA CODE NEURO HEAD AND NECK W CONT     Standing Status:   Standing     Number of Occurrences:   1     Order Specific Question:   Does patient have history of Renal Disease? Answer:   No     Order Specific Question:   Reason for Exam     Answer:   Code Stroke     Order Specific Question:   Transport     Answer:   Stretcher [5]     Order Specific Question:   Decision Support Exception     Answer:   Emergency Medical Condition (MA) [1]    XR CHEST SNGL V     Standing Status:   Standing     Number of Occurrences:   1     Order Specific Question:   Transport     Answer:   BED [2]     Order Specific Question:   Reason for Exam     Answer:   sob    CBC WITH AUTOMATED DIFF     Standing Status:   Standing     Number of Occurrences:   1    METABOLIC PANEL, COMPREHENSIVE     Standing Status:   Standing     Number of Occurrences:   1    PROTHROMBIN TIME + INR     Standing Status:   Standing     Number of Occurrences:   1    TROPONIN I     Standing Status:   Standing     Number of Occurrences:   1    PTT     Standing Status:   Standing     Number of Occurrences:   1    PT     Standing Status:   Standing     Number of Occurrences:   1    DIET NPO     Standing Status:   Standing     Number of Occurrences:   1    CARDIAC MONITOR - ED ONLY     Standing Status:   Standing     Number of Occurrences:   1     Order Specific Question:   Type:      Answer:   Bedside    PULSE OXIMETRY CONTINUOUS     Standing Status:   Standing     Number of Occurrences:   1    VITAL SIGNS     Standing Status:   Standing     Number of Occurrences:   1    INITIATE NURSING DYSPHAGIA SCREENING     Standing Status:   Standing     Number of Occurrences:   1    MEASURE HEIGHT     Standing Status:   Standing     Number of Occurrences:   1    WEIGH PATIENT     Standing Status:   Standing     Number of Occurrences:   1    NEUROLOGIC STATUS ASSESSMENT     Standing Status:   Standing     Number of Occurrences:   1    NIH STROKE SCALE ASSESSMENT     Standing Status:   Standing     Number of Occurrences:   1    ELEVATE HEAD OF BED     Standing Status:   Standing     Number of Occurrences:   1     Order Specific Question:   Degrees of Elevation     Answer:   30    OXYGEN CANNULA     Titrate up to 4l/minute to maintain oxygen saturations at 90% or greater     Standing Status:   Standing     Number of Occurrences:   1     Order Specific Question:   Liters per minute: Answer:   2     Order Specific Question:   Indications for O2 therapy     Answer:   OTHER    POC GLUCOSE     Standing Status:   Standing     Number of Occurrences:   1    EKG, 12 LEAD, INITIAL     Standing Status:   Standing     Number of Occurrences:   1     Order Specific Question:   Reason for Exam:     Answer:   CVA    SALINE LOCK IV ONE TIME STAT     Standing Status:   Standing     Number of Occurrences:   1    iopamidoL (ISOVUE-370) 76 % injection 100 mL    aspirin tablet 325 mg              CONSULTANTS:  Consults      Provider Notes (Medical Decision Making):   40-year-old male presents as a level 2 stroke alert with left arm left leg weakness since 12 am last night. .  Patient is not a TPA candidate secondary to duration of time. CTA performed. Patient's not a thrombectomy candidate as well. Was seen by telemetry neurology recommend stroke work-up admission for MRI/MRA. Symptoms may be related to not being on his anticoagulation for the last 4 days. Is an embolic stroke.     kameron admit    aalam cosult    Procedures           CRITICAL CARE NOTE :  2:22 PM  Amount of Critical Care Time: 45 minutes    IMPENDING DETERIORATION -Airway, Respiratory and Cardiovascular  ASSOCIATED RISK FACTORS - Hypotension and CNS Decompensation  MANAGEMENT- Bedside Assessment and Supervision of Care  INTERPRETATION -  CT Scan  INTERVENTIONS - hemodynamic mngmt  CASE REVIEW - Hospitalist/Intensivist and Medical Sub-Specialist  TREATMENT RESPONSE -Improved  PERFORMED BY - Self    NOTES   :  I have spent critical care time involved in lab review, consultations with specialist, family decision- making, bedside attention and documentation. This time excludes time spent in any separate billed procedures. During this entire length of time I was immediately available to the patient . Angela Leon MD                Disposition       Emergency Department Disposition:  Admitted      Diagnosis     Clinical Impression:   1. Cerebrovascular accident (CVA), unspecified mechanism ([facility] [address]        Attestations:    Angela Leon MD    Please note that this dictation was completed with [employer], the computer voice recognition software. Quite often unanticipated grammatical, syntax, homophones, and other interpretive errors are inadvertently transcribed by the computer software. Please disregard these errors. Please excuse any errors that have escaped final proofreading. Thank you.

## 2021-09-02 NOTE — PROGRESS NOTES
Reason for Admission:  CVA                     RUR Score: 9%                    Plan for utilizing home health:  None/uses no DME/no needs. PCP: First and Last name:  Javon Wilson     Name of Practice:    Are you a current patient: Yes/No: Yes   Approximate date of last visit: Been a while. Can you participate in a virtual visit with your PCP:                   Yes/Call  Current Advanced Directive/Advance Care Plan: Prior      Healthcare Decision Maker:                Primary Decision Maker: Hai Phelps - [facility] - [phone]                  Transition of Care Plan:   D/C Plan is home with family & family member will transport home upon discharge.

## 2021-09-02 NOTE — PROGRESS NOTES
9/2/21. PCP is Dr. Zahira Bullard. D/C Plan is home with family & family member will transport home upon discharge. Uses no DME/no needs for home health.

## 2021-09-02 NOTE — CONSULTS
Neurology Consult Note    HPI  Mr. Trice Cano is a 57 y.o.  male with a history significant for Afib (apixaban), HTN who presented with headache and LHB weakness/numbness. Per chart review, patient has not been taking his apixaban for 4 days prior to presentation because he gets it through the mail and it had not come yet. On the evening prior to presentation, patient developed his presenting symptoms. In the ED, he also had some mild dizziness. Emergent CT head was negative for any acute findings and patient was not deemed a candidate for tPA or neuro intervention. Patient corroborates the above story. He reports he has no prior history of strokes, seizures or neoplastic disorders and walks at home without assistance or device. He reports he only has a little bit of slight numbness in the right palm of his hand otherwise his symptoms have completely resolved. Home Stroke Prophylaxis: Apixaban 5 BID     Stroke Workup     CTH WO  NAICA  Chronic microvascular ischemic changes    CTA Head/Neck  No occlusions, dissections, significant stenosis, pseudoaneurysms or aneurysms in the neurovascular system      IMPRESSION  Mr. Trice Cano is a 57 y.o.  male with the above history presented with headache, dizziness and left hemibody weakness / numbness. Emergent CT head and CTA head/ neck were negative for any acute findings. Patient is not deemed a candidate for tPA or neuro intervention. Patient had not been taking his apixaban for 4 days prior to presentation. Will follow up on MRI of the brain to evaluate for any evidence of cardioembolic infarcts. Will continue patient on antiplatelet therapy for right now but plan to restart apixaban after her MRI of the brain has been obtained.   No need to get rest of stroke workup given if patient does have ischemia is likely cardioembolic given his history of atrial fibrillation being off anticoagulation for a few days which increases is hypercoagulability transiently. RECOMMENDATIONS      LHB Weakness/Numbness, Headache, Dizziness, Rule Out RMCA & Multifocal Infarcts  Afib, Off of Apixaban Recently  -Q6Hr NeuroChecks, TELE  -Stroke Prophylaxis: Apixaban 5 BID  -SBP Goal < 160  -Glucose Goal < 180  -Head of Bed at 30 degrees  -VTE Prophylaxis: SCDs for now  -PT/OT/ST as needed  -Management of metabolic/infectious derangements to referring teams  -F/U MRI Brain WO  --> If there is no evidence of acute/subacute ischemia then no further neurologic work-up will be necessary       Review of Systems  A 14 point review was done and pertinent positives & negative findings are listed as part of the history of present illness, all other systems were reviewed and are negative. Physical/Neurological Exam  Cardiovascular: tachycardic at times  Pulmonary: no increased work of breathing  Skin: warm and dry      Patient awake, alert; following central and peripheral commands   No expressive or receptive aphasia; No dysarthria   Pupils react to light bilaterally; EOM Intact   No visual field deficits on gross exam   No facial droop   No gross hearing loss   Tongue is midline   Motor: 5/5 Throughout   No abnormal movements   Normal tone throughout   Sensation to light touch intact grossly throughout     NIHSS: 0      Past Medical History:   Diagnosis Date    A-fib ([facility] [address]     Bronchitis     Hypertension       No past surgical history on file. No Known Allergies  No family history on file.      Social Connections:     Frequency of Communication with Friends and Family:     Frequency of Social Gatherings with Friends and Family:     Attends Jainism Services:     Active Member of Clubs or Organizations:     Attends Club or Organization Meetings:     Marital Status:          Medications  Current Outpatient Medications   Medication Instructions    amiodarone (CORDARONE) 200 mg, Oral, EVERY 12 HOURS    apixaban (ELIQUIS) 5 mg, Oral, 2 TIMES DAILY    dilTIAZem ER (CARDIZEM CD) 240 mg, Oral, DAILY    naloxone (Narcan) 4 mg/actuation nasal spray Use 1 spray intranasally, then discard. Repeat with new spray every 2 min as needed for opioid overdose symptoms, alternating nostrils. No current facility-administered medications for this encounter. Current Outpatient Medications   Medication Sig    naloxone (Narcan) 4 mg/actuation nasal spray Use 1 spray intranasally, then discard. Repeat with new spray every 2 min as needed for opioid overdose symptoms, alternating nostrils.  amiodarone (CORDARONE) 200 mg tablet Take 1 Tab by mouth every twelve (12) hours every twelve (12) hours.  apixaban (ELIQUIS) 5 mg tablet Take 1 Tab by mouth two (2) times a day.  dilTIAZem ER (CARDIZEM CD) 120 mg capsule Take 2 Caps by mouth daily. Objective  Temp:  [97.4 °F (36.3 °C)]   Pulse (Heart Rate):  [100]   BP: (165)/(106)   Resp Rate:  [18]   O2 Sat (%):  [99 %]   Weight:  [90.7 kg (200 lb)]   No intake or output data in the 24 hours ending 09/02/21 1828  Wt Readings from Last 3 Encounters:   09/02/21 90.7 kg (200 lb)   06/11/21 90.7 kg (200 lb)   03/07/21 90.7 kg (200 lb)        Labs  Recent Results (from the past 24 hour(s))   CBC WITH AUTOMATED DIFF    Collection Time: 09/02/21  1:00 PM   Result Value Ref Range    WBC 9.8 4.1 - 11.1 K/uL    RBC 5.47 4.10 - 5.70 M/uL    HGB 16.8 12.1 - 17.0 g/dL    HCT 49.2 36.6 - 50.3 %    MCV 89.9 80.0 - 99.0 FL    MCH 30.7 26.0 - 34.0 PG    MCHC 34.1 30.0 - 36.5 g/dL    RDW 14.1 11.5 - 14.5 %    PLATELET 655 748 - 185 K/uL    MPV 10.6 8.9 - 12.9 FL    NRBC 0.0 0.0  WBC    ABSOLUTE NRBC 0.00 0.00 - 0.01 K/uL    NEUTROPHILS 61 32 - 75 %    LYMPHOCYTES 29 12 - 49 %    MONOCYTES 8 5 - 13 %    EOSINOPHILS 2 0 - 7 %    BASOPHILS 0 0 - 1 %    IMMATURE GRANULOCYTES 0 0 - 0.5 %    ABS. NEUTROPHILS 6.0 1.8 - 8.0 K/UL    ABS. LYMPHOCYTES 2.8 0.8 - 3.5 K/UL    ABS. MONOCYTES 0.8 0.0 - 1.0 K/UL    ABS.  EOSINOPHILS 0.2 0.0 - 0.4 K/UL    ABS. BASOPHILS 0.0 0.0 - 0.1 K/UL    ABS. IMM. GRANS. 0.0 0.00 - 0.04 K/UL    DF AUTOMATED     METABOLIC PANEL, COMPREHENSIVE    Collection Time: 09/02/21  1:00 PM   Result Value Ref Range    Sodium 133 (L) 136 - 145 mmol/L    Potassium 3.9 3.5 - 5.1 mmol/L    Chloride 103 97 - 108 mmol/L    CO2 25 21 - 32 mmol/L    Anion gap 5 5 - 15 mmol/L    Glucose 128 (H) 65 - 100 mg/dL    BUN 14 6 - 20 mg/dL    Creatinine 0.93 0.70 - 1.30 mg/dL    BUN/Creatinine ratio 15 12 - 20      GFR est AA >60 >60 ml/min/1.73m2    GFR est non-AA >60 >60 ml/min/1.73m2    Calcium 9.2 8.5 - 10.1 mg/dL    Bilirubin, total 0.7 0.2 - 1.0 mg/dL    AST (SGOT) 13 (L) 15 - 37 U/L    ALT (SGPT) 17 12 - 78 U/L    Alk. phosphatase 76 45 - 117 U/L    Protein, total 7.6 6.4 - 8.2 g/dL    Albumin 3.9 3.5 - 5.0 g/dL    Globulin 3.7 2.0 - 4.0 g/dL    A-G Ratio 1.1 1.1 - 2.2     PROTHROMBIN TIME + INR    Collection Time: 09/02/21  1:00 PM   Result Value Ref Range    Prothrombin time 13.4 11.9 - 14.7 sec    INR 1.0 0.9 - 1.1     TROPONIN I    Collection Time: 09/02/21  1:00 PM   Result Value Ref Range    Troponin-I, Qt. <0.05 <0.05 ng/mL   PTT    Collection Time: 09/02/21  1:00 PM   Result Value Ref Range    aPTT 31.2 21.2 - 34.1 sec    aPTT, therapeutic range   82 - 109 sec          Significant Diagnostic Studies  All images independently visualized    XR CHEST SNGL V   Final Result   No acute findings. CT CODE NEURO HEAD WO CONTRAST   Final Result   Evidence for degree nonacute end vessel occlusive change. No gross intracranial hemorrhage as above. Study findings called to Dr. Papo Song 12:45 PM 9/20/2021. CTA CODE NEURO HEAD AND NECK W CONT   Final Result   1. Normal head CT examination. No evidence for aneurysms, focal stenoses or   branch occlusions. Neck CTA         Technique: 3 mm noncontrasted images were obtained through the entire neck.  Then   1.5mm axial images with nonionic intravenous contrast were obtained. 2-D and 3-D   reformats of head and neck CT angiographic data performed. Comparisons: None. Findings: A standard arch is present. No significant stenosis of the origin of   the great vessels evident. Right carotid artery: No stenoses of the right common carotid artery evident. There is no stenosis of the origin of the right internal carotid artery by   NASCET criteria. Left carotid artery: No stenoses of the left common carotid artery evident. There is no stenosis of the origin of the left internal carotid artery by NASCET   criteria. The vertebral arteries are codominant without focal stenosis. IMPRESSION:    1. No stenosis of the origin of the right internal carotid artery by NASCET   criteria. 2. No stenosis of the origin of the left internal carotid artery by NASCET   criteria. This document has been prepared by the Dragon voice recognition system, typographical errors may have occurred.  Attempts have been made to correct errors, however inadvertent errors may persist.

## 2021-09-03 ENCOUNTER — APPOINTMENT (OUTPATIENT)
Dept: NON INVASIVE DIAGNOSTICS | Age: 61
End: 2021-09-03
Attending: FAMILY MEDICINE
Payer: COMMERCIAL

## 2021-09-03 ENCOUNTER — APPOINTMENT (OUTPATIENT)
Dept: MRI IMAGING | Age: 61
End: 2021-09-03
Attending: FAMILY MEDICINE
Payer: COMMERCIAL

## 2021-09-03 LAB
ALBUMIN SERPL-MCNC: 3.8 G/DL (ref 3.5–5)
ALBUMIN/GLOB SERPL: 1 {RATIO} (ref 1.1–2.2)
ALP SERPL-CCNC: 77 U/L (ref 45–117)
ALT SERPL-CCNC: 18 U/L (ref 12–78)
AMPHET UR QL SCN: NEGATIVE
ANION GAP SERPL CALC-SCNC: 6 MMOL/L (ref 5–15)
AST SERPL W P-5'-P-CCNC: 13 U/L (ref 15–37)
BARBITURATES UR QL SCN: NEGATIVE
BASOPHILS # BLD: 0 K/UL (ref 0–0.1)
BASOPHILS NFR BLD: 0 % (ref 0–1)
BENZODIAZ UR QL: NEGATIVE
BILIRUB SERPL-MCNC: 0.9 MG/DL (ref 0.2–1)
BUN SERPL-MCNC: 15 MG/DL (ref 6–20)
BUN/CREAT SERPL: 15 (ref 12–20)
CA-I BLD-MCNC: 9.3 MG/DL (ref 8.5–10.1)
CANNABINOIDS UR QL SCN: NEGATIVE
CHLORIDE SERPL-SCNC: 106 MMOL/L (ref 97–108)
CO2 SERPL-SCNC: 27 MMOL/L (ref 21–32)
COCAINE UR QL SCN: POSITIVE
CREAT SERPL-MCNC: 1.03 MG/DL (ref 0.7–1.3)
DIFFERENTIAL METHOD BLD: ABNORMAL
DRUG SCRN COMMENT,DRGCM: ABNORMAL
ECHO AV MEAN GRADIENT: 1 MMHG
ECHO AV MEAN VELOCITY: 55.6 CM/S
ECHO AV PEAK GRADIENT: 2 MMHG
ECHO AV PEAK VELOCITY: 77.5 CM/S
ECHO AV VTI: 10.5 CM
ECHO LA AREA 4C: 27.4 CM2
ECHO LA MAJOR AXIS: 6.98 CM
ECHO LA MINOR AXIS: 3.31 CM
ECHO LV EDV A4C: 123 CM3
ECHO LV ESV A4C: 52 CM3
ECHO LVOT PEAK GRADIENT: 2 MMHG
ECHO LVOT PEAK VELOCITY: 64.7 CM/S
ECHO LVOT VTI: 10.6 CM
ECHO LVOT VTI: 10.6 CM
ECHO MV AREA PHT: 6.88 CM2
ECHO MV E VELOCITY: 81.9 CM/S
ECHO MV MAX VELOCITY: 80.2 CM/S
ECHO MV MEAN GRADIENT: 1 MMHG
ECHO MV PEAK GRADIENT: 3 MMHG
ECHO MV PRESSURE HALF TIME (PHT): 32 MS
ECHO MV VTI: 15.4 CM
ECHO PV MAX VELOCITY: 70.3 CM/S
ECHO PV MEAN GRADIENT: 1 MMHG
ECHO PV PEAK INSTANTANEOUS GRADIENT SYSTOLIC: 2 MMHG
ECHO PV REGURGITANT MAX VELOCITY: 117 CM/S
ECHO PV VTI: 13.2 CM
ECHO RA AREA 4C: 20.64 CM2
ECHO RV TAPSE: 1.5 CM (ref 1.5–2)
ECHO TV MAX VELOCITY: 246 CM/S
ECHO TV REGURGITANT PEAK GRADIENT: 24 MMHG
EOSINOPHIL # BLD: 0.1 K/UL (ref 0–0.4)
EOSINOPHIL NFR BLD: 1 % (ref 0–7)
ERYTHROCYTE [DISTWIDTH] IN BLOOD BY AUTOMATED COUNT: 14.1 % (ref 11.5–14.5)
GLOBULIN SER CALC-MCNC: 4 G/DL (ref 2–4)
GLUCOSE SERPL-MCNC: 136 MG/DL (ref 65–100)
HCT VFR BLD AUTO: 52.9 % (ref 36.6–50.3)
HGB BLD-MCNC: 17.6 G/DL (ref 12.1–17)
IMM GRANULOCYTES # BLD AUTO: 0 K/UL (ref 0–0.04)
IMM GRANULOCYTES NFR BLD AUTO: 0 % (ref 0–0.5)
LA VOL DISK BP: 83 CM3 (ref 18–58)
LEFT CCA DIST DIAS: 10.9 CM/S
LEFT CCA DIST SYS: 49.5 CM/S
LEFT CCA PROX DIAS: 16.9 CM/S
LEFT CCA PROX SYS: 99.5 CM/S
LEFT ECA DIAS: 15.7 CM/S
LEFT ECA SYS: 84.4 CM/S
LEFT ICA DIST DIAS: 23.2 CM/S
LEFT ICA DIST SYS: 74.4 CM/S
LEFT ICA PROX DIAS: 13 CM/S
LEFT ICA PROX SYS: 39.7 CM/S
LEFT SUBCLAVIAN DIAS: 0 CM/S
LEFT SUBCLAVIAN SYS: 53.2 CM/S
LEFT VERTEBRAL DIAS: 13.2 CM/S
LEFT VERTEBRAL SYS: 38 CM/S
LVOT MG: 1 MMHG
LYMPHOCYTES # BLD: 2.3 K/UL (ref 0.8–3.5)
LYMPHOCYTES NFR BLD: 25 % (ref 12–49)
MCH RBC QN AUTO: 30 PG (ref 26–34)
MCHC RBC AUTO-ENTMCNC: 33.3 G/DL (ref 30–36.5)
MCV RBC AUTO: 90.3 FL (ref 80–99)
METHADONE UR QL: NEGATIVE
MONOCYTES # BLD: 0.6 K/UL (ref 0–1)
MONOCYTES NFR BLD: 6 % (ref 5–13)
MV DEC SLOPE: 7750 MM/S2
MV DEC SLOPE: 7750 MM/S2
NEUTS SEG # BLD: 6.3 K/UL (ref 1.8–8)
NEUTS SEG NFR BLD: 68 % (ref 32–75)
NRBC # BLD: 0 K/UL (ref 0–0.01)
NRBC BLD-RTO: 0 PER 100 WBC
OPIATES UR QL: NEGATIVE
PCP UR QL: NEGATIVE
PLATELET # BLD AUTO: 249 K/UL (ref 150–400)
PMV BLD AUTO: 10.3 FL (ref 8.9–12.9)
POTASSIUM SERPL-SCNC: 3.8 MMOL/L (ref 3.5–5.1)
PROT SERPL-MCNC: 7.8 G/DL (ref 6.4–8.2)
RBC # BLD AUTO: 5.86 M/UL (ref 4.1–5.7)
RIGHT CCA DIST DIAS: 21 CM/S
RIGHT CCA DIST SYS: 58.7 CM/S
RIGHT CCA PROX DIAS: 15 CM/S
RIGHT CCA PROX SYS: 82.2 CM/S
RIGHT ECA DIAS: 15.3 CM/S
RIGHT ECA SYS: 73.5 CM/S
RIGHT ICA DIST DIAS: 18.8 CM/S
RIGHT ICA DIST SYS: 70.3 CM/S
RIGHT ICA PROX DIAS: 23.4 CM/S
RIGHT ICA PROX SYS: 45.4 CM/S
RIGHT SUBCLAVIAN DIAS: 0 CM/S
RIGHT SUBCLAVIAN SYS: 33.6 CM/S
RIGHT VERTEBRAL DIAS: 6.74 CM/S
RIGHT VERTEBRAL SYS: 18.5 CM/S
SODIUM SERPL-SCNC: 139 MMOL/L (ref 136–145)
WBC # BLD AUTO: 9.4 K/UL (ref 4.1–11.1)

## 2021-09-03 PROCEDURE — 36415 COLL VENOUS BLD VENIPUNCTURE: CPT

## 2021-09-03 PROCEDURE — 80307 DRUG TEST PRSMV CHEM ANLYZR: CPT

## 2021-09-03 PROCEDURE — 80053 COMPREHEN METABOLIC PANEL: CPT

## 2021-09-03 PROCEDURE — 70551 MRI BRAIN STEM W/O DYE: CPT

## 2021-09-03 PROCEDURE — G0378 HOSPITAL OBSERVATION PER HR: HCPCS

## 2021-09-03 PROCEDURE — 92610 EVALUATE SWALLOWING FUNCTION: CPT

## 2021-09-03 PROCEDURE — 97165 OT EVAL LOW COMPLEX 30 MIN: CPT

## 2021-09-03 PROCEDURE — 74011250637 HC RX REV CODE- 250/637: Performed by: FAMILY MEDICINE

## 2021-09-03 PROCEDURE — 65270000029 HC RM PRIVATE

## 2021-09-03 PROCEDURE — 97530 THERAPEUTIC ACTIVITIES: CPT

## 2021-09-03 PROCEDURE — 97161 PT EVAL LOW COMPLEX 20 MIN: CPT

## 2021-09-03 PROCEDURE — 93880 EXTRACRANIAL BILAT STUDY: CPT

## 2021-09-03 PROCEDURE — 97116 GAIT TRAINING THERAPY: CPT

## 2021-09-03 PROCEDURE — 93306 TTE W/DOPPLER COMPLETE: CPT

## 2021-09-03 PROCEDURE — 85025 COMPLETE CBC W/AUTO DIFF WBC: CPT

## 2021-09-03 RX ORDER — AMLODIPINE BESYLATE 5 MG/1
5 TABLET ORAL DAILY
Status: DISCONTINUED | OUTPATIENT
Start: 2021-09-04 | End: 2021-09-04 | Stop reason: HOSPADM

## 2021-09-03 RX ORDER — AMLODIPINE BESYLATE 5 MG/1
5 TABLET ORAL
Status: COMPLETED | OUTPATIENT
Start: 2021-09-03 | End: 2021-09-03

## 2021-09-03 RX ADMIN — ASPIRIN 325 MG ORAL TABLET 325 MG: 325 PILL ORAL at 09:00

## 2021-09-03 RX ADMIN — ACETAMINOPHEN 650 MG: 325 TABLET ORAL at 14:56

## 2021-09-03 RX ADMIN — APIXABAN 5 MG: 5 TABLET, FILM COATED ORAL at 09:00

## 2021-09-03 RX ADMIN — ATORVASTATIN CALCIUM 40 MG: 40 TABLET, FILM COATED ORAL at 20:38

## 2021-09-03 RX ADMIN — AMIODARONE HYDROCHLORIDE 200 MG: 200 TABLET ORAL at 20:38

## 2021-09-03 RX ADMIN — AMLODIPINE BESYLATE 5 MG: 5 TABLET ORAL at 14:56

## 2021-09-03 RX ADMIN — AMIODARONE HYDROCHLORIDE 200 MG: 200 TABLET ORAL at 07:45

## 2021-09-03 RX ADMIN — APIXABAN 5 MG: 5 TABLET, FILM COATED ORAL at 20:38

## 2021-09-03 RX ADMIN — DILTIAZEM HYDROCHLORIDE 240 MG: 120 CAPSULE, EXTENDED RELEASE ORAL at 09:00

## 2021-09-03 RX ADMIN — ATORVASTATIN CALCIUM 40 MG: 40 TABLET, FILM COATED ORAL at 22:00

## 2021-09-03 NOTE — PROGRESS NOTES
Problem: Self Care Deficits Care Plan (Adult)  Goal: *Acute Goals and Plan of Care (Insert Text)  Description: Pt will be independent sup<->sit in prep for EOB ADL's  Pt will be independent  LB dressing sitting/standing level  Pt will be independent    sit<-> prep for toilet transfer  Pt will be independent    toilet transfer with LRAD  Pt will be independent    toileting/cloth mgmt LRAD  Pt will be independent   grooming standing sink  Pt will be independent   bathing sitting/standing sink LRAD  Pt will be MI luis UE HEP in prep for self care tasks      Outcome: Not Met     OCCUPATIONAL THERAPY EVALUATION  Patient: Trice Cano (27 y.o. male)  Date: 9/3/2021  Primary Diagnosis: CVA (cerebral vascular accident) [facility]) [I63.9]        Precautions:      ASSESSMENT  Patient is 56 y/o male with headache, left sided weakness, left facial numbness, unsteady gait last few days and adm 9/2/2021 for CVA (MRI showing 8 separate tiny infarcts. These are scattered in the left parietal lobe, left temporal lobe, right thalamus and right osseous cerebellar vermis. The largest infarct is in the right thalamus and measures 12 mm in greatest dimension. 8 separate tiny infarcts. These are scattered in the left parietal lobe, left temporal lobe, right thalamus and right osseous cerebellar vermis. The largest infarct is in the right thalamus), chronic Afib. Pt has hx of Afib, bronchitis, HTN. Pt received semi supine in bed A&Ox4 and agreeable for OT eval/tx. Per pt report, pt lives with family (significant other and daughter) in two story home with 3/4 steps wide luis rails to enter and aprox 15 steps no rails (narrow walls) to second floor bathroom and is independent for self care and functional transfers/mobility working part time as .      Pt stating dizziness with stance only thus orthostatic BP taken with BP of 144/117 semi supine in bed, 146/112 seated EOB, 149/110 standing and 149/110 after activity (eval completed despite high BP 2/2 pt asymptomatic with nursing informed and aware of BP). Pt currently presents with intact luis UE strength (4+/5), intact luis UE sensation (light touch and proprioception), intact luis UE coordination (finger opposition intact, finger to nose slow on LUE however at top of nose 1/2 trials), decreased activity tolerance (2/2 dizziness initially standing and 2/2 fatigue) and increased need for assist with self care (SBA LB dressing EOB, SBA toileting/cloth mgmt simulated, CGA grooming standing simulated) and functional transfers/mobility (SBA sup->sit, SBA scooting EOB, CGA sit<->stand and toilet transfer with gait belt). Pt would benefit from continued skilled OT services while at [facility] in order to increase safety and independence wiht self care and functional transfers/mobility. Recommend discharge to home with [address] [place] [address] when medically appropriate. Other factors to consider for discharge: time since onset, severity of deficits, PLOF        PLAN :  Recommendations and Planned Interventions: self care training, functional mobility training, therapeutic exercise, balance training, therapeutic activities, endurance activities, patient education, and home safety training    Frequency/Duration: Patient will be followed by occupational therapy 3 times a week to address goals. Recommendation for discharge: (in order for the patient to meet his/her long term goals)  HHOT    This discharge recommendation:  Has been made in collaboration with the attending provider and/or case management    IF patient discharges home will need the following DME: TBD       SUBJECTIVE:   Patient stated I just get dizzy when I stand up.     OBJECTIVE DATA SUMMARY:   HISTORY:   Past Medical History:   Diagnosis Date    A-fib ([address]     Bronchitis     Hypertension    No past surgical history on file.     Expanded or extensive additional review of patient history:     Home Situation  Home Environment: Private residence  # Steps to Enter: 4  Rails to Enter: Yes  Hand Rails : Bilateral  One/Two Story Residence: Two story  # of Interior Steps: 15  Interior Rails: None  Living Alone: No  Support Systems: Child(jaclyn), Family member(s)  Patient Expects to be Discharged to[facility] Cor[de-identified]ration  Current DME Used/Available at Home: None    PLOF: Pt independent for ADLS/IADLS, independent with mobility prior to admission. Hand dominance: Right    EXAMINATION OF PERFORMANCE DEFICITS:  Cognitive/Behavioral Status:  Neurologic State: Alert  Orientation Level: Oriented X4  Cognition: Follows commands     Hearing: Auditory  Auditory Impairment: None    Range of Motion:  AROM: Within functional limits  PROM: Within functional limits     Strength:  Strength: Within functional limits     Coordination:  Coordination: Generally decreased, functional       Tone & Sensation:  Tone: Normal  Sensation: Intact     Balance:  Sitting: Intact; Without support  Standing: Impaired; Without support  Standing - Static: Constant support;Good  Standing - Dynamic : Constant support; Fair    Functional Mobility and Transfers for ADLs:  Bed Mobility:  Rolling: Stand-by assistance  Supine to Sit: Stand-by assistance  Scooting: Stand-by assistance    Transfers:  Sit to Stand: Contact guard assistance  Stand to Sit: Contact guard assistance  Bed to Chair: Contact guard assistance  Bathroom Mobility: Contact guard assistance  Toilet Transfer : Contact guard assistance  Assistive Device : Gait Belt    ADL Assessment:     Oral Facial Hygiene/Grooming: Contact guard assistance (standing simulated)    Lower Body Dressing: Stand-by assistance    Toileting: Stand by assistance (simulated)     ADL Intervention and task modifications:     Grooming  Grooming Assistance: Contact guard assistance (simulated)  Position Performed: Standing    Lower Body Dressing Assistance  Socks: Stand-by assistance  Position Performed: Seated edge of bed    Toileting  Toileting Assistance: Stand-by assistance (simulated)    [address] [place] [address] AM-PACTM \"6 Clicks\"                                                       Daily Activity Inpatient Short Form  How much help from another person does the patient currently need. .. Total; A Lot A Little None   1. Putting on and taking off regular lower body clothing? []  1 []  2 [x]  3 []  4   2. Bathing (including washing, rinsing, drying)? []  1 []  2 [x]  3 []  4   3. Toileting, which includes using toilet, bedpan or urinal? [] 1 []  2 [x]  3 []  4   4. Putting on and taking off regular upper body clothing? []  1 []  2 []  3 [x]  4   5. Taking care of personal grooming such as brushing teeth? []  1 []  2 [x]  3 []  4   6. Eating meals? []  1 []  2 []  3 [x]  4   © 2007, Trustees of [address] [place] [address], under license to [employer]. All rights reserved     Score: 20/24     Interpretation of Tool:  Represents clinically-significant functional categories (i.e. Activities of daily living). Percentage of Impairment CH    0%   CI    1-19% CJ    20-39% CK    40-59% CL    60-79% CM    80-99% CN     100%   [facility]  Score 6-24 24 23 20-22 15-19 10-14 7-9 6        Occupational Therapy Evaluation Charge Determination   History Examination Decision-Making   LOW Complexity : Brief history review  LOW Complexity : 1-3 performance deficits relating to physical, cognitive , or psychosocial skils that result in activity limitations and / or participation restrictions  MEDIUM Complexity : Patient may present with comorbidities that affect occupational performnce.  Miniml to moderate modification of tasks or assistance (eg, physical or verbal ) with assesment(s) is necessary to enable patient to complete evaluation       Based on the above components, the patient evaluation is determined to be of the following complexity level: LOW   Pain Rating:  No pain at rest, 8/10 with headache after activity (nursing informed and aware)    Activity Tolerance:   Good and requires rest breaks  Please refer to the flowsheet for vital signs taken during this treatment. After treatment patient left in no apparent distress:    Sitting in chair and Call bell within reach    COMMUNICATION/EDUCATION:   The patients plan of care was discussed with: Physical therapist, Registered nurse, and Case management. PT/OT sessions occurred together for increased safety of pt and clinician. Home safety education was provided and the patient/caregiver indicated understanding. and Patient/family have participated as able in goal setting and plan of care. This patients plan of care is appropriate for delegation to \[facility] [place] [facility]\"".     Thank you for this referral.  Agatha Brunson  Time Calculation: 38 mins

## 2021-09-03 NOTE — ROUTINE PROCESS
Bedside and Verbal shift change report given to Sally Grimaldo (oncoming nurse) by Chey Stallworth (offgoing nurse). Report included the following information SBAR and MAR.

## 2021-09-03 NOTE — PROGRESS NOTES
Consult received. Attempted to see patient. Patient off the floor for multiple procedures. Will attempt bedside swallow evaluation at a later time if time permits. Continue w/ current diet orders.

## 2021-09-03 NOTE — PROGRESS NOTES
Two person skin assessment done with Abby Shah RN. Generalized red spots noted and a scratch to left side.

## 2021-09-03 NOTE — PROGRESS NOTES
General Daily Progress Note          Patient Name:   Trice Cano       YOB: 1960       Age:  57 y.o. Admit Date: 9/2/2021      Subjective:       CHIEF COMPLAINT:      Headache and left-sided weakness     HISTORY OF PRESENT ILLNESS:        Patient is a 57y.o. year old male with signal past medical history of chronic A. fib hypertension came to emergency room complaining of left-sided weakness left facial numbness unsteady gait for few days, with slurred speech. Smokes 1 pack for 4 days. Treatment of possible stroke patient on Eliquis for A. fib due to delay in mail order unable to take Eliquis for 5 days before admission. ---      He reports he has no prior history of strokes, seizures or neoplastic disorders and walks at home without assistance or device. He reports he only has a little bit of slight numbness in the right palm of his hand otherwise his symptoms have completely resolved. Generalized red spots noted and a scratch to left side. Home Stroke Prophylaxis: Apixaban 5 BID    Patient ROM UE/LE intact, 5/5 strength. Patient lost balance yesterday and couldn't walk. Patient is chronic alcohol drinker. Patient has been drinking his whole life, 14+ drinks per week. Patient works as using heavy equipment. Patient notes that he did not have access to Eliquis for 5 days. Patient received MRI, carotid duplex, and ECHO done today. Patient notes L hand sensation loss during the episode of ataxia. Patient notes numbness, tingling. Good strength and ROM. Heart gallop and lungs are CTA bilaterally. Neurology consult 9/3/2021:  Emergent CT head was negative for any acute findings and patient was not deemed a candidate for tPA or neuro intervention. Will continue patient on antiplatelet therapy for right now but plan to restart apixaban after her MRI of the brain has been obtained.   No need to get rest of stroke workup given if patient does have ischemia is likely cardioembolic given his history of atrial fibrillation being off anticoagulation for a few days which increases is hypercoagulability transiently. CTA Head/Neck  No occlusions, dissections, significant stenosis, pseudoaneurysms or aneurysms in the neurovascular system  1. No stenosis of the origin of the right internal carotid artery by NASCET  criteria. 2. No stenosis of the origin of the left internal carotid artery by NASCET  criteria. Vitals 9/3/2021:  109 bpm, 154/104 (baseline at the [facility], 97% RA      LABS 9/2/2021:  INR 1.0, Troponin-I <0.05, aPTT 31.2, PT 13.4    Glc 128    Na 133, AST 13      EKG 12 LEAD 9/2/2021: Afib w/ moderate VR  Nonspecific ST and T wave abnormality       CAROTID DUPLEX 9/2/2021:    PENDING RESULTS      MRI Brain WO CONT 9/3/2021:  Multiple small infarcts observed. Query embolic phenomenon. No evidence for  large complete vascular territory infarction, hemorrhage or mass effect. The paranasal sinuses and mastoid air cells are clear. 8 separate tiny infarcts. These are scattered in the left parietal lobe, left temporal lobe,  right thalamus and right osseous cerebellar vermis. The largest infarct is in  the right thalamus and measures 12 mm in greatest dimension. Given the  distribution and different vascular territories, embolic phenomenon is favored    multiple small patchy areas of high signal are  observed in the white matter. The appearance is consistent with significant  ischemic microvascular disease of white matter. This is more than expected for  age. The ventricles are normal in size and configuration. Flow is present in  the basilar and carotid arteries. The major dural sinuses are patent. The  craniocervical junction is normal. The pituitary gland is appropriate in size  and configuration. IMPRESSION  1. Multiple small infarcts observed. Query embolic phenomenon.  No evidence for  large complete vascular territory infarction, hemorrhage or mass effect      CXR 9/2/2021:  No acute findings. Normal cardiomediastinal silhouette. No vascular congestion or pulmonary edema. The lungs are well-inflated. No  infiltrate, effusion, pneumothorax. No free air under the hemidiaphragms. Bones  grossly intact. CT code neuro Head WO cont 9/2/2021:  Evidence for degree nonacute end vessel occlusive change. No gross intracranial hemorrhage. normal aeration sinuses and mastoid air cells  periventricular/subcortical white  matter gliosis, evidence to suggest nonacute end vessel occlusive change  No gross intracranial hemorrhage. No hydrocephalus                    Past Medical History:   Diagnosis Date    A-fib ([facility] [address]      Bronchitis      Hypertension           No past surgical history on file.     Social History            Tobacco Use    Smoking status: Current Every Day Smoker       Packs/day: 1.50    Smokeless tobacco: Never Used   Substance Use Topics    Alcohol use: Yes       Comment: weekly         No family history on file.     No Known Allergies              Objective:     Visit Vitals  BP (!) 144/99   Pulse 81   Temp 98.2 °F (36.8 °C)   Resp 18   Ht 5' 11\" (1.803 m)   Wt 90.7 kg (200 lb)   SpO2 97%   BMI 27.89 kg/m²        Recent Results (from the past 24 hour(s))   CBC WITH AUTOMATED DIFF    Collection Time: 09/02/21  1:00 PM   Result Value Ref Range    WBC 9.8 4.1 - 11.1 K/uL    RBC 5.47 4.10 - 5.70 M/uL    HGB 16.8 12.1 - 17.0 g/dL    HCT 49.2 36.6 - 50.3 %    MCV 89.9 80.0 - 99.0 FL    MCH 30.7 26.0 - 34.0 PG    MCHC 34.1 30.0 - 36.5 g/dL    RDW 14.1 11.5 - 14.5 %    PLATELET 335 632 - 339 K/uL    MPV 10.6 8.9 - 12.9 FL    NRBC 0.0 0.0  WBC    ABSOLUTE NRBC 0.00 0.00 - 0.01 K/uL    NEUTROPHILS 61 32 - 75 %    LYMPHOCYTES 29 12 - 49 %    MONOCYTES 8 5 - 13 %    EOSINOPHILS 2 0 - 7 %    BASOPHILS 0 0 - 1 %    IMMATURE GRANULOCYTES 0 0 - 0.5 %    ABS. NEUTROPHILS 6.0 1.8 - 8.0 K/UL    ABS. LYMPHOCYTES 2.8 0.8 - 3.5 K/UL    ABS.  MONOCYTES 0.8 0.0 - 1.0 K/UL    ABS. EOSINOPHILS 0.2 0.0 - 0.4 K/UL    ABS. BASOPHILS 0.0 0.0 - 0.1 K/UL    ABS. IMM. GRANS. 0.0 0.00 - 0.04 K/UL    DF AUTOMATED     METABOLIC PANEL, COMPREHENSIVE    Collection Time: 09/02/21  1:00 PM   Result Value Ref Range    Sodium 133 (L) 136 - 145 mmol/L    Potassium 3.9 3.5 - 5.1 mmol/L    Chloride 103 97 - 108 mmol/L    CO2 25 21 - 32 mmol/L    Anion gap 5 5 - 15 mmol/L    Glucose 128 (H) 65 - 100 mg/dL    BUN 14 6 - 20 mg/dL    Creatinine 0.93 0.70 - 1.30 mg/dL    BUN/Creatinine ratio 15 12 - 20      GFR est AA >60 >60 ml/min/1.73m2    GFR est non-AA >60 >60 ml/min/1.73m2    Calcium 9.2 8.5 - 10.1 mg/dL    Bilirubin, total 0.7 0.2 - 1.0 mg/dL    AST (SGOT) 13 (L) 15 - 37 U/L    ALT (SGPT) 17 12 - 78 U/L    Alk.  phosphatase 76 45 - 117 U/L    Protein, total 7.6 6.4 - 8.2 g/dL    Albumin 3.9 3.5 - 5.0 g/dL    Globulin 3.7 2.0 - 4.0 g/dL    A-G Ratio 1.1 1.1 - 2.2     PROTHROMBIN TIME + INR    Collection Time: 09/02/21  1:00 PM   Result Value Ref Range    Prothrombin time 13.4 11.9 - 14.7 sec    INR 1.0 0.9 - 1.1     TROPONIN I    Collection Time: 09/02/21  1:00 PM   Result Value Ref Range    Troponin-I, Qt. <0.05 <0.05 ng/mL   PTT    Collection Time: 09/02/21  1:00 PM   Result Value Ref Range    aPTT 31.2 21.2 - 34.1 sec    aPTT, therapeutic range   82 - 109 sec   EKG, 12 LEAD, INITIAL    Collection Time: 09/02/21  5:12 PM   Result Value Ref Range    Ventricular Rate 104 BPM    Atrial Rate 97 BPM    QRS Duration 94 ms    Q-T Interval 348 ms    QTC Calculation (Bezet) 457 ms    Calculated R Axis 84 degrees    Calculated T Axis 67 degrees    Diagnosis       Afib with moderate VR  Nonspecific ST and T wave abnormality  Abnormal ECG    Confirmed by Ondina Justin MD, [facility] ([id number]) on 9/2/2021 6:32:44 PM     DUPLEX CAROTID BILATERAL    Collection Time: 09/03/21 10:59 AM   Result Value Ref Range    Left CCA dist sys 49.5 cm/s    Left CCA dist elaine 10.9 cm/s    Left CCA prox sys 99.5 cm/s    Left CCA prox elaine 16.9 cm/s    Left ICA dist sys 74.4 cm/s    Left ICA dist elaine 23.2 cm/s    Left ICA prox sys 39.7 cm/s    Left ICA prox elaine 13.0 cm/s    Left ECA sys 84.4 cm/s    LEFT EXTERNAL CAROTID ARTERY D 15.70 cm/s    Left subclavian sys 53.2 cm/s    LEFT SUBCLAVIAN ARTERY D 0.00 cm/s    Left vertebral sys 38.0 cm/s    LEFT VERTEBRAL ARTERY D 13.20 cm/s    Right cca dist sys 58.7 cm/s    Right CCA dist elaine 21.0 cm/s    Right CCA prox sys 82.2 cm/s    Right CCA prox elaine 15.0 cm/s    Right ICA dist sys 70.3 cm/s    Right ICA dist elaine 18.8 cm/s    Right ICA prox sys 45.4 cm/s    Right ICA prox elaine 23.4 cm/s    Right eca sys 73.5 cm/s    RIGHT EXTERNAL CAROTID ARTERY D 15.30 cm/s    Right subclavian sys 33.6 cm/s    RIGHT SUBCLAVIAN ARTERY D 0.00 cm/s    Right vertebral sys 18.5 cm/s    RIGHT VERTEBRAL ARTERY D 6.74 cm/s           Review of Systems    Constitutional: Negative for chills and fever. HENT: Negative. Eyes: Negative. Respiratory: Negative. Cardiovascular: Negative. Gastrointestinal: Negative for abdominal pain and nausea. Skin: Negative. Neurological: Negative. Physical Exam:      Physical Exam  Constitutional:       Appearance: Normal appearance. He is normal weight. HENT:      Head: Normocephalic. Right Ear: External ear normal.      Left Ear: External ear normal.      Nose: Nose normal.      Mouth/Throat:      Mouth: Mucous membranes are moist.      Pharynx: Oropharynx is clear. Eyes:      Extraocular Movements: Extraocular movements intact. Conjunctiva/sclera: Conjunctivae normal.   Cardiovascular:      Rate and Rhythm: Normal rate. Heart sounds: Gallop present. Pulmonary:      Effort: Pulmonary effort is normal.      Breath sounds: Normal breath sounds. Musculoskeletal:         General: Normal range of motion. Cervical back: Normal range of motion and neck supple. Skin:     General: Skin is warm and dry.    Neurological:      General: No focal deficit present. Mental Status: He is alert. Mental status is at baseline. Psychiatric:         Mood and Affect: Mood normal.         Behavior: Behavior normal.         Thought Content: Thought content normal.         Judgment: Judgment normal.           DUPLEX CAROTID BILATERAL   Final Result      MRI BRAIN WO CONT   Final Result   1. Multiple small infarcts observed. Query embolic phenomenon. No evidence for   large complete vascular territory infarction, hemorrhage or mass effect. XR CHEST SNGL V   Final Result   No acute findings. CT CODE NEURO HEAD WO CONTRAST   Final Result   Evidence for degree nonacute end vessel occlusive change. No gross intracranial hemorrhage as above. Study findings called to Dr. Keny Funk 12:45 PM 9/20/2021. CTA CODE NEURO HEAD AND NECK W CONT   Final Result   1. Normal head CT examination. No evidence for aneurysms, focal stenoses or   branch occlusions. Neck CTA         Technique: 3 mm noncontrasted images were obtained through the entire neck. Then   1.5mm axial images with nonionic intravenous contrast were obtained. 2-D and 3-D   reformats of head and neck CT angiographic data performed. Comparisons: None. Findings: A standard arch is present. No significant stenosis of the origin of   the great vessels evident. Right carotid artery: No stenoses of the right common carotid artery evident. There is no stenosis of the origin of the right internal carotid artery by   NASCET criteria. Left carotid artery: No stenoses of the left common carotid artery evident. There is no stenosis of the origin of the left internal carotid artery by NASCET   criteria. The vertebral arteries are codominant without focal stenosis. IMPRESSION:    1. No stenosis of the origin of the right internal carotid artery by NASCET   criteria.    2. No stenosis of the origin of the left internal carotid artery by NASCET   criteria. Recent Results (from the past 24 hour(s))   CBC WITH AUTOMATED DIFF    Collection Time: 09/02/21  1:00 PM   Result Value Ref Range    WBC 9.8 4.1 - 11.1 K/uL    RBC 5.47 4.10 - 5.70 M/uL    HGB 16.8 12.1 - 17.0 g/dL    HCT 49.2 36.6 - 50.3 %    MCV 89.9 80.0 - 99.0 FL    MCH 30.7 26.0 - 34.0 PG    MCHC 34.1 30.0 - 36.5 g/dL    RDW 14.1 11.5 - 14.5 %    PLATELET 829 885 - 177 K/uL    MPV 10.6 8.9 - 12.9 FL    NRBC 0.0 0.0  WBC    ABSOLUTE NRBC 0.00 0.00 - 0.01 K/uL    NEUTROPHILS 61 32 - 75 %    LYMPHOCYTES 29 12 - 49 %    MONOCYTES 8 5 - 13 %    EOSINOPHILS 2 0 - 7 %    BASOPHILS 0 0 - 1 %    IMMATURE GRANULOCYTES 0 0 - 0.5 %    ABS. NEUTROPHILS 6.0 1.8 - 8.0 K/UL    ABS. LYMPHOCYTES 2.8 0.8 - 3.5 K/UL    ABS. MONOCYTES 0.8 0.0 - 1.0 K/UL    ABS. EOSINOPHILS 0.2 0.0 - 0.4 K/UL    ABS. BASOPHILS 0.0 0.0 - 0.1 K/UL    ABS. IMM. GRANS. 0.0 0.00 - 0.04 K/UL    DF AUTOMATED     METABOLIC PANEL, COMPREHENSIVE    Collection Time: 09/02/21  1:00 PM   Result Value Ref Range    Sodium 133 (L) 136 - 145 mmol/L    Potassium 3.9 3.5 - 5.1 mmol/L    Chloride 103 97 - 108 mmol/L    CO2 25 21 - 32 mmol/L    Anion gap 5 5 - 15 mmol/L    Glucose 128 (H) 65 - 100 mg/dL    BUN 14 6 - 20 mg/dL    Creatinine 0.93 0.70 - 1.30 mg/dL    BUN/Creatinine ratio 15 12 - 20      GFR est AA >60 >60 ml/min/1.73m2    GFR est non-AA >60 >60 ml/min/1.73m2    Calcium 9.2 8.5 - 10.1 mg/dL    Bilirubin, total 0.7 0.2 - 1.0 mg/dL    AST (SGOT) 13 (L) 15 - 37 U/L    ALT (SGPT) 17 12 - 78 U/L    Alk.  phosphatase 76 45 - 117 U/L    Protein, total 7.6 6.4 - 8.2 g/dL    Albumin 3.9 3.5 - 5.0 g/dL    Globulin 3.7 2.0 - 4.0 g/dL    A-G Ratio 1.1 1.1 - 2.2     PROTHROMBIN TIME + INR    Collection Time: 09/02/21  1:00 PM   Result Value Ref Range    Prothrombin time 13.4 11.9 - 14.7 sec    INR 1.0 0.9 - 1.1     TROPONIN I    Collection Time: 09/02/21  1:00 PM   Result Value Ref Range    Troponin-I, Qt. <0.05 <0.05 ng/mL PTT    Collection Time: 09/02/21  1:00 PM   Result Value Ref Range    aPTT 31.2 21.2 - 34.1 sec    aPTT, therapeutic range   82 - 109 sec   EKG, 12 LEAD, INITIAL    Collection Time: 09/02/21  5:12 PM   Result Value Ref Range    Ventricular Rate 104 BPM    Atrial Rate 97 BPM    QRS Duration 94 ms    Q-T Interval 348 ms    QTC Calculation (Bezet) 457 ms    Calculated R Axis 84 degrees    Calculated T Axis 67 degrees    Diagnosis       Afib with moderate VR  Nonspecific ST and T wave abnormality  Abnormal ECG    Confirmed by Mando Pickett MD, [facility] ([id number]) on 9/2/2021 6:32:44 PM     DUPLEX CAROTID BILATERAL    Collection Time: 09/03/21 10:59 AM   Result Value Ref Range    Left CCA dist sys 49.5 cm/s    Left CCA dist elaine 10.9 cm/s    Left CCA prox sys 99.5 cm/s    Left CCA prox elaine 16.9 cm/s    Left ICA dist sys 74.4 cm/s    Left ICA dist elaine 23.2 cm/s    Left ICA prox sys 39.7 cm/s    Left ICA prox elaine 13.0 cm/s    Left ECA sys 84.4 cm/s    LEFT EXTERNAL CAROTID ARTERY D 15.70 cm/s    Left subclavian sys 53.2 cm/s    LEFT SUBCLAVIAN ARTERY D 0.00 cm/s    Left vertebral sys 38.0 cm/s    LEFT VERTEBRAL ARTERY D 13.20 cm/s    Right cca dist sys 58.7 cm/s    Right CCA dist elaine 21.0 cm/s    Right CCA prox sys 82.2 cm/s    Right CCA prox elaine 15.0 cm/s    Right ICA dist sys 70.3 cm/s    Right ICA dist elaine 18.8 cm/s    Right ICA prox sys 45.4 cm/s    Right ICA prox elaine 23.4 cm/s    Right eca sys 73.5 cm/s    RIGHT EXTERNAL CAROTID ARTERY D 15.30 cm/s    Right subclavian sys 33.6 cm/s    RIGHT SUBCLAVIAN ARTERY D 0.00 cm/s    Right vertebral sys 18.5 cm/s    RIGHT VERTEBRAL ARTERY D 6.74 cm/s       Results     ** No results found for the last 336 hours.  **           Labs:     Recent Labs     09/02/21  1300   WBC 9.8   HGB 16.8   HCT 49.2        Recent Labs     09/02/21  1300   *   K 3.9      CO2 25   BUN 14   CREA 0.93   *   CA 9.2     Recent Labs     09/02/21  1300   ALT 17   AP 76   TBILI 0.7   TP 7. 6   ALB 3.9   GLOB 3.7     Recent Labs     09/02/21  1300   INR 1.0   PTP 13.4   APTT 31.2      No results for input(s): FE, TIBC, PSAT, FERR in the last 72 hours. No results found for: FOL, RBCF   No results for input(s): PH, PCO2, PO2 in the last 72 hours. Recent Labs     09/02/21  1300   TROIQ <0.05     No results found for: CHOL, CHOLX, CHLST, CHOLV, HDL, HDLP, LDL, LDLC, DLDLP, TGLX, TRIGL, TRIGP, CHHD, CHHDX  No results found for: GLUCPOC  No results found for: COLOR, APPRN, SPGRU, REFSG, FLAKITA, PROTU, GLUCU, KETU, BILU, UROU, MARCI, LEUKU, GLUKE, EPSU, BACTU, WBCU, RBCU, CASTS, UCRY      Assessment:     CVA  IMPRESSION  1. Multiple small infarcts observed. Query embolic phenomenon. No evidence for  large complete vascular territory infarction, hemorrhage or mass effect  Ataxia    Chronic A.  Fib      Hypertension  154/104    Ongoing smoking      Chronic alcohol use      Hx of cocaine use      Plan:         Pending echo carotid duplex results    Cardiology consult for MADDY    IP consults: OT, PT, ST    IP neurology consult  CVA    IP cardiology consult  Chronic a fib    Continue patient on antiplatelet therapy for right now  Plan to restart apixaban after her MRI of the brain has been obtained        Amiodarone (CORDARONE) tablet 200 mg, 200 mg, Oral, Q12H  Apixaban (ELIQUIS) tablet 5 mg, 5 mg, Oral, BID, Soumya Mendoza MD  Start DilTIAZem ER (CARDIZEM CD) capsule 240 mg, 240 mg, Oral, DAILY  0.9% sodium chloride infusion, 75 mL/hr, IntraVENous, CONTINUOUS  Acetaminophen (TYLENOL) tablet 650 mg, 650 mg, Oral, Q6H PRN **OR** acetaminophen (TYLENOL) suppository 650 mg, 650 mg, Rectal, Q6H PRN  Polyethylene glycol (MIRALAX) packet 17 g, 17 g, Oral, DAILY PRN  Ondansetron (ZOFRAN ODT) tablet 4 mg, 4 mg, Oral, Q8H PRN **OR** ondansetron (ZOFRAN) injection 4 mg, 4 mg, IntraVENous, Q6H PRN  Start aspirin tablet 325 mg, 325 mg, Oral, DAILY  Atorvastatin (LIPITOR) tablet 40 mg, 40 mg, Oral, QHS        Current Facility-Administered Medications:     amiodarone (CORDARONE) tablet 200 mg, 200 mg, Oral, Q12H, Rosmery Mendoza MD, 200 mg at 09/03/21 0745    apixaban (ELIQUIS) tablet 5 mg, 5 mg, Oral, BID, Rosmery Mendoza MD, 5 mg at 09/03/21 0900    dilTIAZem ER (CARDIZEM CD) capsule 240 mg, 240 mg, Oral, DAILY, Rosmery Mendoza MD, 240 mg at 09/03/21 0900    0.9% sodium chloride infusion, 75 mL/hr, IntraVENous, CONTINUOUS, Rosmery Mendoza MD, Last Rate: 75 mL/hr at 09/02/21 2338, 75 mL/hr at 09/02/21 2338    acetaminophen (TYLENOL) tablet 650 mg, 650 mg, Oral, Q6H PRN **OR** acetaminophen (TYLENOL) suppository 650 mg, 650 mg, Rectal, Q6H PRN, Lorena Mendoza MD    polyethylene glycol (MIRALAX) packet 17 g, 17 g, Oral, DAILY PRN, Lorena Mendoza MD    ondansetron (ZOFRAN ODT) tablet 4 mg, 4 mg, Oral, Q8H PRN **OR** ondansetron (ZOFRAN) injection 4 mg, 4 mg, IntraVENous, Q6H PRN, Rosmery Mendoza MD    aspirin tablet 325 mg, 325 mg, Oral, DAILY, Rosmery Mendoza MD, 325 mg at 09/03/21 0900    atorvastatin (LIPITOR) tablet 40 mg, 40 mg, Oral, QHS, Rosmery Mendoza MD, 40 mg at 09/02/21 4422

## 2021-09-03 NOTE — PROGRESS NOTES
Problem: Mobility Impaired (Adult and Pediatric)  Goal: *Acute Goals and Plan of Care (Insert Text)  Description: Physical Therapy Goals  Initiated 9/3/21  1)  I with HEP in 7 days to improve overall functional mobility. 2)  Bed mobility and transfers I in 7 days to prevent skin breakdown. 3)  Pt to amb 300ft with LRAD and sup in 7 days    Pt. Goal:  Pt to be able to walk safely without falls. Outcome: Not Met     PHYSICAL THERAPY EVALUATION  Patient: Farheen Skelton (14 y.o. male)  Date: 9/3/2021  Primary Diagnosis: CVA (cerebral vascular accident) [facility]) [I63.9]        Precautions: CVA       ASSESSMENT  Per OT note:  \"Patient is 58 y/o male with headache, left sided weakness, left facial numbness, unsteady gait last few days and adm 9/2/2021 for CVA (MRI showing 8 separate tiny infarcts. These are scattered in the left parietal lobe, left temporal lobe, right thalamus and right osseous cerebellar vermis. The largest infarct is in the right thalamus and measures 12 mm in greatest dimension. 8 separate tiny infarcts. These are scattered in the left parietal lobe, left temporal lobe, right thalamus and right osseous cerebellar vermis. The largest infarct is in the right thalamus), chronic Afib.      Pt has hx of Afib, bronchitis, HTN. Pt received semi supine in bed A&Ox4 and agreeable for OT eval/tx.  Per pt report, pt lives with family (significant other and daughter) in two story home with 3/4 steps wide luis rails to enter and aprox 15 steps no rails (narrow walls) to second floor bathroom and is independent for self care and functional transfers/mobility working part time as .      Pt stating dizziness with stance only thus orthostatic BP taken with BP of 144/117 semi supine in bed, 146/112 seated EOB, 149/110 standing and 149/110 after activity (eval completed despite high BP 2/2 pt asymptomatic with nursing informed and aware of BP.) \"     Based on the objective data described below, the patient presents with good LE strength, intact sensation, decreased coordination, able to perform bed mobility and transfers MI, but difficulty with gait requiring CGA due to decreased weightbearing on L LE with R trunk lean with L shoulder elevated and minimal arm swing. Pt amb with slight hip hike, and slow dav with slight unsteadiness once fatigued and when changing directions. Pt would benefit from skilled PT services to improve overall gait and balance and pt may benefit from use of AD next time. Recommend d/c to home with HHPT. Other factors to consider for discharge: impaired mobility, level of assist.     Patient will benefit from skilled therapy intervention to address the above noted impairments. PLAN :  Recommendations and Planned Interventions: bed mobility training, transfer training, gait training, therapeutic exercises, patient and family training/education and therapeutic activities      Frequency/Duration: Patient will be followed by physical therapy:  5 times a week to address goals. Recommendation for discharge: (in order for the patient to meet his/her long term goals)  HHPT    This discharge recommendation:  Has been made in collaboration with the attending provider and/or case management    IF patient discharges home will need the following DME: to be determined (TBD)         SUBJECTIVE:   Patient stated I'm feeling better.     OBJECTIVE DATA SUMMARY:   HISTORY:    Past Medical History:   Diagnosis Date    A-fib ([facility] [address]     Bronchitis     Hypertension    No past surgical history on file.     Personal factors and/or comorbidities impacting plan of care: impaired mobility, level of assist    Home Situation  Home Environment: Private residence  # Steps to Enter: 4  Rails to Enter: Yes  Hand Rails : Bilateral  One/Two Story Residence: Two story  # of Interior Steps: 15  Interior Rails: None  Living Alone: No  Support Systems: Child(jaclyn), Family member(s)  Patient Expects to be Discharged to[de-identified] [place]  Current DME Used/Available at Home: None    PLOF: Pt I  for ADLS/IADLS, I with mobility prior to admission. EXAMINATION/PRESENTATION/DECISION MAKING:   Critical Behavior:  Neurologic State: Alert  Orientation Level: Oriented X4  Cognition: Follows commands     Hearing: Auditory  Auditory Impairment: None  Range Of Motion:  AROM: Within functional limits           PROM: Within functional limits           Strength:    Strength: Within functional limits       5/5 throughout B LE's              Tone & Sensation:   Tone: Normal              Sensation: Intact               Coordination:  Coordination: Generally decreased, functional    Finger Opposition Finger to Nose Dysdiadokinesis Proprioception   Right UE  [] Intact    [] Impaired   [] Intact    [] Impaired [] Intact    [] Impaired [] Intact    [] Impaired   Left UE [] Intact    [] Impaired [] Intact    [] Impaired [] Intact    [] Impaired [] Intact    [] Impaired    Heel to Beltran Heel taps Trace square Proprioception   Right LE  [x] Intact    [] Impaired [x] Intact    [] Impaired [] Intact    [x] Impaired [x] Intact    [] Impaired   Left LE [x] Intact    [] Impaired [x] Intact    [] Impaired [] Intact    [x] Impaired [] Intact    [x] Impaired       Vision:      Functional Mobility:  Bed Mobility:  Rolling: Stand-by assistance  Supine to Sit: Stand-by assistance     Scooting: Stand-by assistance  Transfers:  Sit to Stand: Contact guard assistance  Stand to Sit: Contact guard assistance        Bed to Chair: Contact guard assistance              Balance:   Sitting: Intact; Without support  Standing: Impaired; Without support  Standing - Static: Constant support;Good  Standing - Dynamic : Constant support; Fair  Ambulation/Gait Training:  Distance (ft): 175 Feet (ft)  Assistive Device: Gait belt  Ambulation - Level of Assistance: Contact guard assistance     Gait Description (WDL): Exceptions to WDL  Gait Abnormalities: Hip Hike Speed/Janet: Slow                      Functional Measure:    [facility] AM-PAC 6 Clicks         Basic Mobility Inpatient Short Form  How much difficulty does the patient currently have. .. Unable A Lot A Little None   1. Turning over in bed (including adjusting bedclothes, sheets and blankets)? [] 1   [] 2   [] 3   [x] 4   2. Sitting down on and standing up from a chair with arms ( e.g., wheelchair, bedside commode, etc.)   [] 1   [] 2   [] 3   [x] 4   3. Moving from lying on back to sitting on the side of the bed? [] 1   [] 2   [] 3   [x] 4          How much help from another person does the patient currently need. .. Total A Lot A Little None   4. Moving to and from a bed to a chair (including a wheelchair)? [] 1   [] 2   [] 3   [x] 4   5. Need to walk in hospital room? [] 1   [] 2   [x] 3   [] 4   6. Climbing 3-5 steps with a railing? [] 1   [] 2   [x] 3   [] 4   © , Trustees of [facility] [employer], under license to [employer]. All rights reserved     Score:  Initial: 22 Most Recent: X (Date:9/3/21)   Interpretation of Tool:  Represents activities that are increasingly more difficult (i.e. Bed mobility, Transfers, Gait). Score 24 23 22-20 19-15 14-10 9-7 6   Modifier CH CI CJ CK CL CM CN          Physical Therapy Evaluation Charge Determination   History Examination Presentation Decision-Making   MEDIUM  Complexity : 1-2 comorbidities / personal factors will impact the outcome/ POC  MEDIUM Complexity : 3 Standardized tests and measures addressing body structure, function, activity limitation and / or participation in recreation  LOW Complexity : Stable, uncomplicated  Other Functional Measure [facility] 6 low      Based on the above components, the patient evaluation is determined to be of the following complexity level: LOW     Pain Ratin/10    Activity Tolerance:   Fair  Please refer to the flowsheet for vital signs taken during this treatment.     After treatment patient left in no apparent distress:   Sitting in chair and Call bell within reach    COMMUNICATION/EDUCATION:   The patients plan of care was discussed with: Registered nurse. Patient/family agree to work toward stated goals and plan of care. PT/OT sessions occurred together for increased safety of pt and clinician.     Thank you for this referral.  [place]   Time Calculation: 30 mins

## 2021-09-03 NOTE — PROGRESS NOTES
General Daily Progress Note          Patient Name:   Brannon Jewell       YOB: 1960       Age:  57 y.o. Admit Date: 9/2/2021      Subjective:       CHIEF COMPLAINT:      Headache and left-sided weakness     HISTORY OF PRESENT ILLNESS:        Patient is a 57y.o. year old male with signal past medical history of chronic A. fib hypertension came to emergency room complaining of left-sided weakness left facial numbness unsteady gait for few days, with slurred speech. Smokes 1 pack for 4 days. Treatment of possible stroke patient on Eliquis for A. fib due to delay in mail order unable to take Eliquis for 5 days before admission. ---      He reports he has no prior history of strokes, seizures or neoplastic disorders and walks at home without assistance or device. He reports he only has a little bit of slight numbness in the right palm of his hand otherwise his symptoms have completely resolved. Generalized red spots noted and a scratch to left side. Home Stroke Prophylaxis: Apixaban 5 BID    Patient ROM UE/LE intact, 5/5 strength. Patient lost balance yesterday and couldn't walk. Patient is chronic alcohol drinker. Patient has been drinking his whole life, 14+ drinks per week. Patient works as using heavy equipment. Patient notes that he did not have access to Eliquis for 5 days. Patient received MRI, carotid duplex, and ECHO done today. Patient notes L hand sensation loss during the episode of ataxia. Patient notes numbness, tingling. Good strength and ROM. Heart gallop and lungs are CTA bilaterally. Neurology consult 9/3/2021:  Emergent CT head was negative for any acute findings and patient was not deemed a candidate for tPA or neuro intervention. Will continue patient on antiplatelet therapy for right now but plan to restart apixaban after her MRI of the brain has been obtained.   No need to get rest of stroke workup given if patient does have ischemia is likely cardioembolic given his history of atrial fibrillation being off anticoagulation for a few days which increases is hypercoagulability transiently. CTA Head/Neck  No occlusions, dissections, significant stenosis, pseudoaneurysms or aneurysms in the neurovascular system  1. No stenosis of the origin of the right internal carotid artery by NASCET  criteria. 2. No stenosis of the origin of the left internal carotid artery by NASCET  criteria. Vitals 9/3/2021:  109 bpm, 154/104 (baseline at the [facility], 97% RA      LABS 9/2/2021:  INR 1.0, Troponin-I <0.05, aPTT 31.2, PT 13.4    Glc 128    Na 133, AST 13      EKG 12 LEAD 9/2/2021: Afib w/ moderate VR  Nonspecific ST and T wave abnormality       CAROTID DUPLEX 9/2/2021:    PENDING RESULTS      MRI Brain WO CONT 9/3/2021:  Multiple small infarcts observed. Query embolic phenomenon. No evidence for  large complete vascular territory infarction, hemorrhage or mass effect. The paranasal sinuses and mastoid air cells are clear. 8 separate tiny infarcts. These are scattered in the left parietal lobe, left temporal lobe,  right thalamus and right osseous cerebellar vermis. The largest infarct is in  the right thalamus and measures 12 mm in greatest dimension. Given the  distribution and different vascular territories, embolic phenomenon is favored    multiple small patchy areas of high signal are  observed in the white matter. The appearance is consistent with significant  ischemic microvascular disease of white matter. This is more than expected for  age. The ventricles are normal in size and configuration. Flow is present in  the basilar and carotid arteries. The major dural sinuses are patent. The  craniocervical junction is normal. The pituitary gland is appropriate in size  and configuration. CXR 9/2/2021:  No acute findings. Normal cardiomediastinal silhouette. No vascular congestion or pulmonary edema. The lungs are well-inflated. No  infiltrate, effusion, pneumothorax. No free air under the hemidiaphragms. Bones  grossly intact. CT code neuro Head WO cont 9/2/2021:  Evidence for degree nonacute end vessel occlusive change. No gross intracranial hemorrhage. normal aeration sinuses and mastoid air cells  periventricular/subcortical white  matter gliosis, evidence to suggest nonacute end vessel occlusive change  No gross intracranial hemorrhage. No hydrocephalus                    Past Medical History:   Diagnosis Date    A-fib ([address]      Bronchitis      Hypertension           No past surgical history on file.     Social History            Tobacco Use    Smoking status: Current Every Day Smoker       Packs/day: 1.50    Smokeless tobacco: Never Used   Substance Use Topics    Alcohol use: Yes       Comment: weekly         No family history on file.     No Known Allergies              Objective:     Visit Vitals  BP (!) 154/104   Pulse (!) 109   Temp 98.2 °F (36.8 °C)   Resp 18   Ht 5' 11\" (1.803 m)   Wt 200 lb (90.7 kg)   SpO2 97%   BMI 27.89 kg/m²        Recent Results (from the past 24 hour(s))   CBC WITH AUTOMATED DIFF    Collection Time: 09/02/21  1:00 PM   Result Value Ref Range    WBC 9.8 4.1 - 11.1 K/uL    RBC 5.47 4.10 - 5.70 M/uL    HGB 16.8 12.1 - 17.0 g/dL    HCT 49.2 36.6 - 50.3 %    MCV 89.9 80.0 - 99.0 FL    MCH 30.7 26.0 - 34.0 PG    MCHC 34.1 30.0 - 36.5 g/dL    RDW 14.1 11.5 - 14.5 %    PLATELET 552 860 - 903 K/uL    MPV 10.6 8.9 - 12.9 FL    NRBC 0.0 0.0  WBC    ABSOLUTE NRBC 0.00 0.00 - 0.01 K/uL    NEUTROPHILS 61 32 - 75 %    LYMPHOCYTES 29 12 - 49 %    MONOCYTES 8 5 - 13 %    EOSINOPHILS 2 0 - 7 %    BASOPHILS 0 0 - 1 %    IMMATURE GRANULOCYTES 0 0 - 0.5 %    ABS. NEUTROPHILS 6.0 1.8 - 8.0 K/UL    ABS. LYMPHOCYTES 2.8 0.8 - 3.5 K/UL    ABS. MONOCYTES 0.8 0.0 - 1.0 K/UL    ABS. EOSINOPHILS 0.2 0.0 - 0.4 K/UL    ABS. BASOPHILS 0.0 0.0 - 0.1 K/UL    ABS. IMM.  GRANS. 0.0 0.00 - 0.04 K/UL    DF AUTOMATED METABOLIC PANEL, COMPREHENSIVE    Collection Time: 09/02/21  1:00 PM   Result Value Ref Range    Sodium 133 (L) 136 - 145 mmol/L    Potassium 3.9 3.5 - 5.1 mmol/L    Chloride 103 97 - 108 mmol/L    CO2 25 21 - 32 mmol/L    Anion gap 5 5 - 15 mmol/L    Glucose 128 (H) 65 - 100 mg/dL    BUN 14 6 - 20 mg/dL    Creatinine 0.93 0.70 - 1.30 mg/dL    BUN/Creatinine ratio 15 12 - 20      GFR est AA >60 >60 ml/min/1.73m2    GFR est non-AA >60 >60 ml/min/1.73m2    Calcium 9.2 8.5 - 10.1 mg/dL    Bilirubin, total 0.7 0.2 - 1.0 mg/dL    AST (SGOT) 13 (L) 15 - 37 U/L    ALT (SGPT) 17 12 - 78 U/L    Alk. phosphatase 76 45 - 117 U/L    Protein, total 7.6 6.4 - 8.2 g/dL    Albumin 3.9 3.5 - 5.0 g/dL    Globulin 3.7 2.0 - 4.0 g/dL    A-G Ratio 1.1 1.1 - 2.2     PROTHROMBIN TIME + INR    Collection Time: 09/02/21  1:00 PM   Result Value Ref Range    Prothrombin time 13.4 11.9 - 14.7 sec    INR 1.0 0.9 - 1.1     TROPONIN I    Collection Time: 09/02/21  1:00 PM   Result Value Ref Range    Troponin-I, Qt. <0.05 <0.05 ng/mL   PTT    Collection Time: 09/02/21  1:00 PM   Result Value Ref Range    aPTT 31.2 21.2 - 34.1 sec    aPTT, therapeutic range   82 - 109 sec   EKG, 12 LEAD, INITIAL    Collection Time: 09/02/21  5:12 PM   Result Value Ref Range    Ventricular Rate 104 BPM    Atrial Rate 97 BPM    QRS Duration 94 ms    Q-T Interval 348 ms    QTC Calculation (Bezet) 457 ms    Calculated R Axis 84 degrees    Calculated T Axis 67 degrees    Diagnosis       Afib with moderate VR  Nonspecific ST and T wave abnormality  Abnormal ECG    Confirmed by Gabriele Guzman ([id number]) on 9/2/2021 6:32:44 PM             Review of Systems    Constitutional: Negative for chills and fever. HENT: Negative. Eyes: Negative. Respiratory: Negative. Cardiovascular: Negative. Gastrointestinal: Negative for abdominal pain and nausea. Skin: Negative. Neurological: Negative.         Physical Exam:      Physical Exam  Constitutional: Appearance: Normal appearance. He is normal weight. HENT:      Head: Normocephalic. Right Ear: External ear normal.      Left Ear: External ear normal.      Nose: Nose normal.      Mouth/Throat:      Mouth: Mucous membranes are moist.      Pharynx: Oropharynx is clear. Eyes:      Extraocular Movements: Extraocular movements intact. Conjunctiva/sclera: Conjunctivae normal.   Cardiovascular:      Rate and Rhythm: Normal rate. Heart sounds: Gallop present. Pulmonary:      Effort: Pulmonary effort is normal.      Breath sounds: Normal breath sounds. Musculoskeletal:         General: Normal range of motion. Cervical back: Normal range of motion and neck supple. Skin:     General: Skin is warm and dry. Neurological:      General: No focal deficit present. Mental Status: He is alert. Mental status is at baseline. Psychiatric:         Mood and Affect: Mood normal.         Behavior: Behavior normal.         Thought Content: Thought content normal.         Judgment: Judgment normal.           XR CHEST SNGL V   Final Result   No acute findings. CT CODE NEURO HEAD WO CONTRAST   Final Result   Evidence for degree nonacute end vessel occlusive change. No gross intracranial hemorrhage as above. Study findings called to Dr. Lon Redding 12:45 PM 9/20/2021. CTA CODE NEURO HEAD AND NECK W CONT   Final Result   1. Normal head CT examination. No evidence for aneurysms, focal stenoses or   branch occlusions. Neck CTA         Technique: 3 mm noncontrasted images were obtained through the entire neck. Then   1.5mm axial images with nonionic intravenous contrast were obtained. 2-D and 3-D   reformats of head and neck CT angiographic data performed. Comparisons: None. Findings: A standard arch is present. No significant stenosis of the origin of   the great vessels evident.        Right carotid artery: No stenoses of the right common carotid artery evident. There is no stenosis of the origin of the right internal carotid artery by   NASCET criteria. Left carotid artery: No stenoses of the left common carotid artery evident. There is no stenosis of the origin of the left internal carotid artery by NASCET   criteria. The vertebral arteries are codominant without focal stenosis. IMPRESSION:    1. No stenosis of the origin of the right internal carotid artery by NASCET   criteria. 2. No stenosis of the origin of the left internal carotid artery by NASCET   criteria. DUPLEX CAROTID BILATERAL    (Results Pending)   MRI BRAIN WO CONT    (Results Pending)        Recent Results (from the past 24 hour(s))   CBC WITH AUTOMATED DIFF    Collection Time: 09/02/21  1:00 PM   Result Value Ref Range    WBC 9.8 4.1 - 11.1 K/uL    RBC 5.47 4.10 - 5.70 M/uL    HGB 16.8 12.1 - 17.0 g/dL    HCT 49.2 36.6 - 50.3 %    MCV 89.9 80.0 - 99.0 FL    MCH 30.7 26.0 - 34.0 PG    MCHC 34.1 30.0 - 36.5 g/dL    RDW 14.1 11.5 - 14.5 %    PLATELET 822 480 - 683 K/uL    MPV 10.6 8.9 - 12.9 FL    NRBC 0.0 0.0  WBC    ABSOLUTE NRBC 0.00 0.00 - 0.01 K/uL    NEUTROPHILS 61 32 - 75 %    LYMPHOCYTES 29 12 - 49 %    MONOCYTES 8 5 - 13 %    EOSINOPHILS 2 0 - 7 %    BASOPHILS 0 0 - 1 %    IMMATURE GRANULOCYTES 0 0 - 0.5 %    ABS. NEUTROPHILS 6.0 1.8 - 8.0 K/UL    ABS. LYMPHOCYTES 2.8 0.8 - 3.5 K/UL    ABS. MONOCYTES 0.8 0.0 - 1.0 K/UL    ABS. EOSINOPHILS 0.2 0.0 - 0.4 K/UL    ABS. BASOPHILS 0.0 0.0 - 0.1 K/UL    ABS. IMM.  GRANS. 0.0 0.00 - 0.04 K/UL    DF AUTOMATED     METABOLIC PANEL, COMPREHENSIVE    Collection Time: 09/02/21  1:00 PM   Result Value Ref Range    Sodium 133 (L) 136 - 145 mmol/L    Potassium 3.9 3.5 - 5.1 mmol/L    Chloride 103 97 - 108 mmol/L    CO2 25 21 - 32 mmol/L    Anion gap 5 5 - 15 mmol/L    Glucose 128 (H) 65 - 100 mg/dL    BUN 14 6 - 20 mg/dL    Creatinine 0.93 0.70 - 1.30 mg/dL    BUN/Creatinine ratio 15 12 - 20      GFR est AA >60 >60 ml/min/1.73m2    GFR est non-AA >60 >60 ml/min/1.73m2    Calcium 9.2 8.5 - 10.1 mg/dL    Bilirubin, total 0.7 0.2 - 1.0 mg/dL    AST (SGOT) 13 (L) 15 - 37 U/L    ALT (SGPT) 17 12 - 78 U/L    Alk. phosphatase 76 45 - 117 U/L    Protein, total 7.6 6.4 - 8.2 g/dL    Albumin 3.9 3.5 - 5.0 g/dL    Globulin 3.7 2.0 - 4.0 g/dL    A-G Ratio 1.1 1.1 - 2.2     PROTHROMBIN TIME + INR    Collection Time: 09/02/21  1:00 PM   Result Value Ref Range    Prothrombin time 13.4 11.9 - 14.7 sec    INR 1.0 0.9 - 1.1     TROPONIN I    Collection Time: 09/02/21  1:00 PM   Result Value Ref Range    Troponin-I, Qt. <0.05 <0.05 ng/mL   PTT    Collection Time: 09/02/21  1:00 PM   Result Value Ref Range    aPTT 31.2 21.2 - 34.1 sec    aPTT, therapeutic range   82 - 109 sec   EKG, 12 LEAD, INITIAL    Collection Time: 09/02/21  5:12 PM   Result Value Ref Range    Ventricular Rate 104 BPM    Atrial Rate 97 BPM    QRS Duration 94 ms    Q-T Interval 348 ms    QTC Calculation (Bezet) 457 ms    Calculated R Axis 84 degrees    Calculated T Axis 67 degrees    Diagnosis       Afib with moderate VR  Nonspecific ST and T wave abnormality  Abnormal ECG    Confirmed by Sanna Cook MD, [facility] ([id number]) on 9/2/2021 6:32:44 PM         Results     ** No results found for the last 336 hours. **           Labs:     Recent Labs     09/02/21  1300   WBC 9.8   HGB 16.8   HCT 49.2        Recent Labs     09/02/21  1300   *   K 3.9      CO2 25   BUN 14   CREA 0.93   *   CA 9.2     Recent Labs     09/02/21  1300   ALT 17   AP 76   TBILI 0.7   TP 7.6   ALB 3.9   GLOB 3.7     Recent Labs     09/02/21  1300   INR 1.0   PTP 13.4   APTT 31.2      No results for input(s): FE, TIBC, PSAT, FERR in the last 72 hours. No results found for: FOL, RBCF   No results for input(s): PH, PCO2, PO2 in the last 72 hours.   Recent Labs     09/02/21  1300   TROIQ <0.05     No results found for: CHOL, CHOLX, CHLST, CHOLV, HDL, HDLP, LDL, LDLC, DLDLP, TGLX, TRIGL, TRIGP, [facility], [facility]  No results found for: [facility]  No results found for: COLOR, APPRN, SPGRU, REFSG, FLAKITA, PROTU, GLUCU, KETU, BILU, UROU, MARCI, LEUKU, GLUKE, EPSU, BACTU, WBCU, RBCU, CASTS, UCRY      Assessment:     CVA  Ataxia    Chronic A.  Fib      Hypertension  154/104    Ongoing smoking      Chronic alcohol use      Hx of cocaine use      Plan:         Pending echo carotid duplex results    IP consults: OT, PT, ST    IP neurology consult  CVA    IP cardiology consult  Chronic a fib    Continue patient on antiplatelet therapy for right now  Plan to restart apixaban after her MRI of the brain has been obtained        Amiodarone (CORDARONE) tablet 200 mg, 200 mg, Oral, Q12H  Apixaban (ELIQUIS) tablet 5 mg, 5 mg, Oral, BID, Manolo Mendoza MD  Start DilTIAZem ER (CARDIZEM CD) capsule 240 mg, 240 mg, Oral, DAILY  0.9% sodium chloride infusion, 75 mL/hr, IntraVENous, CONTINUOUS  Acetaminophen (TYLENOL) tablet 650 mg, 650 mg, Oral, Q6H PRN **OR** acetaminophen (TYLENOL) suppository 650 mg, 650 mg, Rectal, Q6H PRN  Polyethylene glycol (MIRALAX) packet 17 g, 17 g, Oral, DAILY PRN  Ondansetron (ZOFRAN ODT) tablet 4 mg, 4 mg, Oral, Q8H PRN **OR** ondansetron (ZOFRAN) injection 4 mg, 4 mg, IntraVENous, Q6H PRN  Start aspirin tablet 325 mg, 325 mg, Oral, DAILY  Atorvastatin (LIPITOR) tablet 40 mg, 40 mg, Oral, QHS        Current Facility-Administered Medications:     amiodarone (CORDARONE) tablet 200 mg, 200 mg, Oral, Q12H, Rosmery Mendoza MD, 200 mg at 09/02/21 2259    apixaban (ELIQUIS) tablet 5 mg, 5 mg, Oral, BID, Rosmery Mendoza MD, 5 mg at 09/02/21 2259    dilTIAZem ER (CARDIZEM CD) capsule 240 mg, 240 mg, Oral, DAILY, Rosmery Mendoza MD    0.9% sodium chloride infusion, 75 mL/hr, IntraVENous, CONTINUOUS, Rosmery Mendoza MD, Last Rate: 75 mL/hr at 09/02/21 2338, 75 mL/hr at 09/02/21 2338    acetaminophen (TYLENOL) tablet 650 mg, 650 mg, Oral, Q6H PRN **OR** acetaminophen (TYLENOL) suppository 650 mg, 650 mg, Rectal, Q6H PRN, Rui Mendoza MD    polyethylene glycol (MIRALAX) packet 17 g, 17 g, Oral, DAILY PRN, Rui Mendoza MD    ondansetron (ZOFRAN ODT) tablet 4 mg, 4 mg, Oral, Q8H PRN **OR** ondansetron (ZOFRAN) injection 4 mg, 4 mg, IntraVENous, Q6H PRN, Rosmery Mendoza MD    aspirin tablet 325 mg, 325 mg, Oral, DAILY, Rosmery Mendoza MD    atorvastatin (LIPITOR) tablet 40 mg, 40 mg, Oral, QHS, Rosmery Mendoza MD, 40 mg at 09/02/21 7699

## 2021-09-03 NOTE — ROUTINE PROCESS
TRANSFER - OUT REPORT:    Verbal report given to Juan Manuel [place]uyl[place] Havenraghu Durand RN on Lauro Banuelos  being transferred to [address] [place] [address] for routine progression of care       Report consisted of patients Situation, Background, Assessment and   Recommendations(SBAR). Information from the following report(s) SBAR and ED Summary was reviewed with the receiving nurse. Lines:   Peripheral IV 09/02/21 Right Antecubital (Active)   Site Assessment Clean, dry, & intact 09/02/21 1900   Dressing Status Clean, dry, & intact 09/02/21 1900   Dressing Type Transparent;Tape 09/02/21 1900   Hub Color/Line Status Pink 09/02/21 1900        Opportunity for questions and clarification was provided.       Patient transported with:   Monitor  Tech

## 2021-09-03 NOTE — PROGRESS NOTES
Daughter Krish Villarreal [phone], called to talk to patient and patient is currently off of the unit. Please have patient call his daughter when he returns to room.

## 2021-09-04 VITALS
DIASTOLIC BLOOD PRESSURE: 73 MMHG | TEMPERATURE: 97.7 F | OXYGEN SATURATION: 95 % | RESPIRATION RATE: 18 BRPM | WEIGHT: 200 LBS | HEART RATE: 60 BPM | SYSTOLIC BLOOD PRESSURE: 119 MMHG | HEIGHT: 71 IN | BODY MASS INDEX: 28 KG/M2

## 2021-09-04 PROCEDURE — 74011250637 HC RX REV CODE- 250/637: Performed by: FAMILY MEDICINE

## 2021-09-04 PROCEDURE — 97530 THERAPEUTIC ACTIVITIES: CPT

## 2021-09-04 PROCEDURE — 74011250636 HC RX REV CODE- 250/636: Performed by: FAMILY MEDICINE

## 2021-09-04 PROCEDURE — G0378 HOSPITAL OBSERVATION PER HR: HCPCS

## 2021-09-04 RX ORDER — ASPIRIN 325 MG
325 TABLET ORAL DAILY
Qty: 30 TABLET | Refills: 0 | Status: SHIPPED | OUTPATIENT
Start: 2021-09-05

## 2021-09-04 RX ORDER — AMLODIPINE BESYLATE 5 MG/1
5 TABLET ORAL DAILY
Qty: 30 TABLET | Refills: 0 | Status: SHIPPED | OUTPATIENT
Start: 2021-09-05

## 2021-09-04 RX ORDER — ATORVASTATIN CALCIUM 40 MG/1
40 TABLET, FILM COATED ORAL
Qty: 60 TABLET | Refills: 0 | Status: SHIPPED | OUTPATIENT
Start: 2021-09-04

## 2021-09-04 RX ADMIN — SODIUM CHLORIDE 75 ML/HR: 9 INJECTION, SOLUTION INTRAVENOUS at 06:07

## 2021-09-04 RX ADMIN — ASPIRIN 325 MG ORAL TABLET 325 MG: 325 PILL ORAL at 08:45

## 2021-09-04 RX ADMIN — AMLODIPINE BESYLATE 5 MG: 5 TABLET ORAL at 08:45

## 2021-09-04 RX ADMIN — DILTIAZEM HYDROCHLORIDE 240 MG: 120 CAPSULE, EXTENDED RELEASE ORAL at 08:46

## 2021-09-04 RX ADMIN — ACETAMINOPHEN 650 MG: 325 TABLET ORAL at 12:29

## 2021-09-04 RX ADMIN — APIXABAN 5 MG: 5 TABLET, FILM COATED ORAL at 08:46

## 2021-09-04 RX ADMIN — AMIODARONE HYDROCHLORIDE 200 MG: 200 TABLET ORAL at 08:47

## 2021-09-04 NOTE — DISCHARGE INSTRUCTIONS
Discharge Instructions       PATIENT ID: Patrice Capone  MRN: [id number]   YOB: 1960    DATE OF ADMISSION: 9/2/2021 12:27 PM    DATE OF DISCHARGE: 9/4/2021    PRIMARY CARE PROVIDER: None     ATTENDING PHYSICIAN: Alba Carter MD  DISCHARGING PROVIDER: Angelo Dodd MD    To contact this individual call [phone] and ask the  to page. If unavailable ask to be transferred the Adult Hospitalist Department. DISCHARGE DIAGNOSES CVA    CONSULTATIONS: IP CONSULT TO NEUROLOGY  IP CONSULT TO NEUROLOGY  IP CONSULT TO CARDIOLOGY  IP CONSULT TO CARDIOLOGY    PROCEDURES/SURGERIES: * No surgery found *    PENDING TEST RESULTS:   At the time of discharge the following test results are still pending: MADDY    FOLLOW UP APPOINTMENTS:   Follow-up Information     Follow up With Specialties Details Why Contact Info    None    None (395) Patient stated that they have no PCP             ADDITIONAL CARE RECOMMENDATIONS: ***    DIET: Cardiac Diet      ACTIVITY: Activity as tolerated    Wound care: Wound Care Order: submitted to [facility] Please view [email or web address]    EQUIPMENT needed: No      DISCHARGE MEDICATIONS:   See Medication Reconciliation Form    · It is important that you take the medication exactly as they are prescribed. · Keep your medication in the bottles provided by the pharmacist and keep a list of the medication names, dosages, and times to be taken in your wallet. · Do not take other medications without consulting your doctor. NOTIFY YOUR PHYSICIAN FOR ANY OF THE FOLLOWING:   Fever over 101 degrees for 24 hours. Chest pain, shortness of breath, fever, chills, nausea, vomiting, diarrhea, change in mentation, falling, weakness, bleeding. Severe pain or pain not relieved by medications. Or, any other signs or symptoms that you may have questions about.       DISPOSITION:    Home With:   OT  PT  [place]  RN       SNF/Inpatient Rehab/LTAC Independent/assisted living    Hospice    Other:         PROBLEM LIST Updated:  Yes  yes       Signed:   Angelo Dodd MD  9/4/2021  2:29 PM     Discharge Instructions       PATIENT ID: Patrice Capone  MRN: [id number]   YOB: 1960    DATE OF ADMISSION: 9/2/2021 12:27 PM    DATE OF DISCHARGE: 9/4/2021    PRIMARY CARE PROVIDER: None     ATTENDING PHYSICIAN: Alba Carter MD  DISCHARGING PROVIDER: Angelo Dodd MD    To contact this individual call [phone] and ask the  to page. If unavailable ask to be transferred the Adult Hospitalist Department. DISCHARGE DIAGNOSES ***    CONSULTATIONS: IP CONSULT TO NEUROLOGY  IP CONSULT TO NEUROLOGY  IP CONSULT TO CARDIOLOGY  IP CONSULT TO CARDIOLOGY    PROCEDURES/SURGERIES: * No surgery found *    PENDING TEST RESULTS:   At the time of discharge the following test results are still pending: ***    FOLLOW UP APPOINTMENTS:   Follow-up Information     Follow up With Specialties Details Why Contact Info    None    None (395) Patient stated that they have no PCP             ADDITIONAL CARE RECOMMENDATIONS: ***    DIET: {diet:93036}      ACTIVITY: {discharge activity:27928}    Wound care: {[facility] Wound Care Instructions:57599}    EQUIPMENT needed: ***      DISCHARGE MEDICATIONS:   See Medication Reconciliation Form    · It is important that you take the medication exactly as they are prescribed. · Keep your medication in the bottles provided by the pharmacist and keep a list of the medication names, dosages, and times to be taken in your wallet. · Do not take other medications without consulting your doctor. NOTIFY YOUR PHYSICIAN FOR ANY OF THE FOLLOWING:   Fever over 101 degrees for 24 hours. Chest pain, shortness of breath, fever, chills, nausea, vomiting, diarrhea, change in mentation, falling, weakness, bleeding. Severe pain or pain not relieved by medications.   Or, any other signs or symptoms that you may have questions about.      DISPOSITION:    Home With:   OT  PT  HH  RN       SNF/Inpatient Rehab/LTAC    Independent/assisted living    Hospice    Other:         PROBLEM LIST Updated:  Yes ***       Signed:   Cyril Ayers MD  9/4/2021  2:29 PM

## 2021-09-04 NOTE — PROGRESS NOTES
Patient with history of chronic atrial fibrillation is followed by my partner Dr. Aly Escamilla. Patient now is admitted with CVA and cardiology consultation was obtained for assessing cardiac source of emboli by MADDY. The procedure was discussed with the patient. Patient's MADDY unfortunately cannot be done till Monday or Tuesday due to long weekend. I will discuss patient's case with attending physician Dr. Sukhwinder Naylor.

## 2021-09-04 NOTE — DISCHARGE SUMMARY
Discharge Summary       PATIENT ID: Checo Montalvo  MRN: [id number]   YOB: 1960    DATE OF ADMISSION: 9/2/2021 12:27 PM    DATE OF DISCHARGE:   PRIMARY CARE PROVIDER: None     ATTENDING PHYSICIAN: Rajwinder Mendoza  DISCHARGING PROVIDER: Rajwinder Mendoza      CONSULTATIONS: IP CONSULT TO NEUROLOGY  IP CONSULT TO NEUROLOGY  IP CONSULT TO CARDIOLOGY  IP CONSULT TO CARDIOLOGY    PROCEDURES/SURGERIES: * No surgery found *    ADMITTING DIAGNOSES:    Patient Active Problem List    Diagnosis Date Noted    CVA (cerebral vascular accident) ([facility] [address] 09/02/2021    Atrial fibrillation with RVR ([facility] [address] 11/02/2020    Tobacco abuse 11/02/2020       DISCHARGE DIAGNOSES / PLAN:      CVA    MRI   IMPRESSION  1. Multiple small infarcts observed. Query embolic phenomenon. No evidence for  large complete vascular territory infarction, hemorrhage or mass effect  Ataxia     Chronic A. Fib        Hypertension  154/104     Ongoing smoking        Chronic alcohol use        Hx of cocaine use        DISCHARGE MEDICATIONS:  Current Discharge Medication List      START taking these medications    Details   amLODIPine (NORVASC) 5 mg tablet Take 1 Tablet by mouth daily. Indications: high blood pressure  Qty: 30 Tablet, Refills: 0  Start date: 9/5/2021      aspirin (ASPIRIN) 325 mg tablet Take 1 Tablet by mouth daily. Qty: 30 Tablet, Refills: 0  Start date: 9/5/2021      atorvastatin (LIPITOR) 40 mg tablet Take 1 Tablet by mouth nightly. Qty: 60 Tablet, Refills: 0  Start date: 9/4/2021         CONTINUE these medications which have NOT CHANGED    Details   amiodarone (CORDARONE) 200 mg tablet Take 1 Tab by mouth every twelve (12) hours every twelve (12) hours. Qty: 60 Tab, Refills: 0      apixaban (ELIQUIS) 5 mg tablet Take 1 Tab by mouth two (2) times a day.   Qty: 60 Tab, Refills: 0         STOP taking these medications       naloxone (Narcan) 4 mg/actuation nasal spray Comments:   Reason for Stopping:         dilTIAZem ER (CARDIZEM CD) 120 mg capsule Comments:   Reason for Stopping:                 NOTIFY YOUR PHYSICIAN FOR ANY OF THE FOLLOWING:   Fever over 101 degrees for 24 hours. Chest pain, shortness of breath, fever, chills, nausea, vomiting, diarrhea, change in mentation, falling, weakness, bleeding. Severe pain or pain not relieved by medications. Or, any other signs or symptoms that you may have questions about. DISPOSITION:  x  Home With:   OT  PT  HH  RN       Long term SNF/Inpatient Rehab    Independent/assisted living    Hospice    Other:       PATIENT CONDITION AT DISCHARGE: Stable      PHYSICAL EXAMINATION AT DISCHARGE:  General:          Alert, cooperative, no distress, appears stated age. HEENT:           Atraumatic, anicteric sclerae, pink conjunctivae                          No oral ulcers, mucosa moist, throat clear, dentition fair  Neck:               Supple, symmetrical  Lungs:             Clear to auscultation bilaterally. No Wheezing or Rhonchi. No rales. Chest wall:      No tenderness  No Accessory muscle use. Heart:              Regular  rhythm,  No  murmur   No edema  Abdomen:        Soft, non-tender. Not distended. Bowel sounds normal  Extremities:     No cyanosis. No clubbing,                            Skin turgor normal, Capillary refill normal  Skin:                Not pale. Not Jaundiced  No rashes   Psych:             Not anxious or agitated. Neurologic:      Alert, moves all extremities, answers questions appropriately and responds to commands     DUPLEX CAROTID BILATERAL   Final Result      MRI BRAIN WO CONT   Final Result   1. Multiple small infarcts observed. Query embolic phenomenon. No evidence for   large complete vascular territory infarction, hemorrhage or mass effect. XR CHEST SNGL V   Final Result   No acute findings. CT CODE NEURO HEAD WO CONTRAST   Final Result   Evidence for degree nonacute end vessel occlusive change. No gross intracranial hemorrhage as above. Study findings called to Dr. Fatimah Ruvalcaba 12:45 PM 9/20/2021. CTA CODE NEURO HEAD AND NECK W CONT   Final Result   1. Normal head CT examination. No evidence for aneurysms, focal stenoses or   branch occlusions. Neck CTA         Technique: 3 mm noncontrasted images were obtained through the entire neck. Then   1.5mm axial images with nonionic intravenous contrast were obtained. 2-D and 3-D   reformats of head and neck CT angiographic data performed. Comparisons: None. Findings: A standard arch is present. No significant stenosis of the origin of   the great vessels evident. Right carotid artery: No stenoses of the right common carotid artery evident. There is no stenosis of the origin of the right internal carotid artery by   NASCET criteria. Left carotid artery: No stenoses of the left common carotid artery evident. There is no stenosis of the origin of the left internal carotid artery by NASCET   criteria. The vertebral arteries are codominant without focal stenosis. IMPRESSION:    1. No stenosis of the origin of the right internal carotid artery by NASCET   criteria. 2. No stenosis of the origin of the left internal carotid artery by NASCET   criteria. Recent Results (from the past 24 hour(s))   DRUG SCREEN, URINE    Collection Time: 09/03/21  6:41 PM   Result Value Ref Range    AMPHETAMINES Negative Negative      BARBITURATES Negative Negative      BENZODIAZEPINES Negative Negative      COCAINE Positive (A) Negative      METHADONE Negative Negative      OPIATES Negative Negative      PCP(PHENCYCLIDINE) Negative Negative      THC (TH-CANNABINOL) Negative Negative      Drug screen comment        This test is a screen for drugs of abuse in a medical setting only (i.e., they are unconfirmed results and as such must not be used for non-medical purposes, e.g.,employment testing, legal testing).  Due to its inherent nature, false positive (FP) and false negative (FN) results may be obtained. Therefore, if necessary for medical care, recommend confirmation of positive findings by GC/MS. HOSPITAL COURSE:  Patient is a 58 y. o. year old male with signal past medical history of chronic A. fib hypertension came to emergency room complaining of left-sided weakness left facial numbness unsteady gait for few days, with slurred speech. Smokes 1 pack for 4 days. Treatment of possible stroke patient on Eliquis for A. fib due to delay in mail order unable to take Eliquis for 5 days before admission.         He reports he has no prior history of strokes, seizures or neoplastic disorders and walks at home without assistance or device. He reports he only has a little bit of slight numbness in the right palm of his hand otherwise his symptoms have completely resolved. Generalized red spots noted and a scratch to left side. Patient was seen by neurology and cardiology during this admission MRI was done shows multiple infarct likely embolic and MADDY was recommended patient was seen by the cardiology unable to do 38 on Monday or Tuesday    2D echo done shows ejection fraction about 25%    Previous echo shows ejection fraction of 45%    Patient denies any chest pain shortness of breath nausea vomiting diarrhea no constipation  Discussed with the patient regarding using cocaine and cigarette cardiac history and stroke and A.  Fib    He understand if cleared by neurology and cardiology patient can be discharged home and follow-up on Monday or Tuesday for MADDY        Signed:   Tita Ellis MD  9/4/2021  2:30 PM

## 2021-09-04 NOTE — ROUTINE PROCESS
Patient discharged home. Education on medications and follow up appointments(Patient will make appointment with cardiologist  next week  given patient verbalized understanding. Discharge plan of care/case management plan validated with provider discharge order. raymond

## 2021-09-04 NOTE — CONSULTS
Neurology Consult Note    HPI  Mr. Trice Cano is a 57 y.o.  male with a history significant for Afib (apixaban), HTN who presented with headache and LHB weakness/numbness. Per chart review, patient has not been taking his apixaban for 4 days prior to presentation because he gets it through the mail and it had not come yet. On the evening prior to presentation, patient developed his presenting symptoms. In the ED, he also had some mild dizziness. Emergent CT head was negative for any acute findings and patient was not deemed a candidate for tPA or neuro intervention. Patient corroborates the above story. He reports he has no prior history of strokes, seizures or neoplastic disorders and walks at home without assistance or device. He reports he only has a little bit of slight numbness in the right palm of his hand otherwise his symptoms have completely resolved. Home Stroke Prophylaxis: Apixaban 5 BID     Stroke Workup     CTH WO  NAICA  Chronic microvascular ischemic changes    CTA Head/Neck  No occlusions, dissections, significant stenosis, pseudoaneurysms or aneurysms in the neurovascular system      IMPRESSION  Mr. Trice Cano is a 57 y.o.  male with the above history presented with headache, dizziness and left hemibody weakness / numbness. Emergent CT head and CTA head/ neck were negative for any acute findings. Patient is not deemed a candidate for tPA or neuro intervention. Patient had not been taking his apixaban for 4 days prior to presentation. Will follow up on MRI of the brain to evaluate for any evidence of cardioembolic infarcts. Will continue patient on antiplatelet therapy for right now but plan to restart apixaban after her MRI of the brain has been obtained.   No need to get rest of stroke workup given if patient does have ischemia is likely cardioembolic given his history of atrial fibrillation being off anticoagulation for a few days which increases is hypercoagulability transiently. RECOMMENDATIONS      LHB Weakness/Numbness, Headache, Dizziness, Rule Out RMCA & Multifocal Infarcts  Afib, Off of Apixaban Recently  -Q6Hr NeuroChecks, TELE  -Stroke Prophylaxis: Apixaban 5 BID  -SBP Goal < 160  -Glucose Goal < 180  -Head of Bed at 30 degrees  -VTE Prophylaxis: SCDs for now  -PT/OT/ST as needed  -Management of metabolic/infectious derangements to referring teams  -F/U MRI Brain WO  --> multiple embolic strokes secondary to A fib. Eliquis re started. No need for any further workup NO MADDY needed. D/w Dr. Stephanie Mercedes. --> fu with cardiologist outpatient          Review of Systems  A 14 point review was done and pertinent positives & negative findings are listed as part of the history of present illness, all other systems were reviewed and are negative. Physical/Neurological Exam  Cardiovascular: tachycardic at times  Pulmonary: no increased work of breathing  Skin: warm and dry      Patient awake, alert; following central and peripheral commands   No expressive or receptive aphasia; No dysarthria   Pupils react to light bilaterally; EOM Intact   No visual field deficits on gross exam   No facial droop   No gross hearing loss   Tongue is midline   Motor: 5/5 Throughout   No abnormal movements   Normal tone throughout   Sensation to light touch intact grossly throughout     NIHSS: 0      Past Medical History:   Diagnosis Date    A-fib ([address]     Bronchitis     Hypertension       No past surgical history on file. No Known Allergies  No family history on file.      Social Connections:     Frequency of Communication with Friends and Family:     Frequency of Social Gatherings with Friends and Family:     Attends Holiness Services:     Active Member of Clubs or Organizations:     Attends Club or Organization Meetings:     Marital Status:          Medications  Current Outpatient Medications   Medication Instructions    amiodarone (CORDARONE) 200 mg, Oral, EVERY 12 HOURS    [START ON 9/5/2021] amLODIPine (NORVASC) 5 mg, Oral, DAILY    apixaban (ELIQUIS) 5 mg, Oral, 2 TIMES DAILY    [START ON 9/5/2021] aspirin (ASPIRIN) 325 mg, Oral, DAILY    atorvastatin (LIPITOR) 40 mg, Oral, EVERY BEDTIME    dilTIAZem ER (CARDIZEM CD) 240 mg, Oral, DAILY    naloxone (Narcan) 4 mg/actuation nasal spray Use 1 spray intranasally, then discard. Repeat with new spray every 2 min as needed for opioid overdose symptoms, alternating nostrils.        Current Facility-Administered Medications   Medication Dose Route Frequency    amLODIPine (NORVASC) tablet 5 mg  5 mg Oral DAILY    amiodarone (CORDARONE) tablet 200 mg  200 mg Oral Q12H    apixaban (ELIQUIS) tablet 5 mg  5 mg Oral BID    dilTIAZem ER (CARDIZEM CD) capsule 240 mg  240 mg Oral DAILY    acetaminophen (TYLENOL) tablet 650 mg  650 mg Oral Q6H PRN    Or    acetaminophen (TYLENOL) suppository 650 mg  650 mg Rectal Q6H PRN    polyethylene glycol (MIRALAX) packet 17 g  17 g Oral DAILY PRN    ondansetron (ZOFRAN ODT) tablet 4 mg  4 mg Oral Q8H PRN    Or    ondansetron (ZOFRAN) injection 4 mg  4 mg IntraVENous Q6H PRN    aspirin tablet 325 mg  325 mg Oral DAILY    atorvastatin (LIPITOR) tablet 40 mg  40 mg Oral QHS        Objective  Temp:  [97.4 °F (36.3 °C)-98.5 °F (36.9 °C)]   Pulse (Heart Rate):  []   BP: (111-165)/()   Resp Rate:  [18-20]   O2 Sat (%):  [95 %-99 %]   Weight:  [90.7 kg (200 lb)]     Intake/Output Summary (Last 24 hours) at 9/4/2021 1438  Last data filed at 9/4/2021 0530  Gross per 24 hour   Intake 360 ml   Output 200 ml   Net 160 ml     Wt Readings from Last 3 Encounters:   09/02/21 90.7 kg (200 lb)   06/11/21 90.7 kg (200 lb)   03/07/21 90.7 kg (200 lb)        Labs  Recent Results (from the past 24 hour(s))   DRUG SCREEN, URINE    Collection Time: 09/03/21  6:41 PM   Result Value Ref Range    AMPHETAMINES Negative Negative      BARBITURATES Negative Negative      BENZODIAZEPINES Negative Negative COCAINE Positive (A) Negative      METHADONE Negative Negative      OPIATES Negative Negative      PCP(PHENCYCLIDINE) Negative Negative      THC (TH-CANNABINOL) Negative Negative      Drug screen comment        This test is a screen for drugs of abuse in a medical setting only (i.e., they are unconfirmed results and as such must not be used for non-medical purposes, e.g.,employment testing, legal testing). Due to its inherent nature, false positive (FP) and false negative (FN) results may be obtained. Therefore, if necessary for medical care, recommend confirmation of positive findings by GC/MS. Significant Diagnostic Studies  All images independently visualized    DUPLEX CAROTID BILATERAL   Final Result      MRI BRAIN WO CONT   Final Result   1. Multiple small infarcts observed. Query embolic phenomenon. No evidence for   large complete vascular territory infarction, hemorrhage or mass effect. XR CHEST SNGL V   Final Result   No acute findings. CT CODE NEURO HEAD WO CONTRAST   Final Result   Evidence for degree nonacute end vessel occlusive change. No gross intracranial hemorrhage as above. Study findings called to Dr. Vanda Villar 12:45 PM 9/20/2021. CTA CODE NEURO HEAD AND NECK W CONT   Final Result   1. Normal head CT examination. No evidence for aneurysms, focal stenoses or   branch occlusions. Neck CTA         Technique: 3 mm noncontrasted images were obtained through the entire neck. Then   1.5mm axial images with nonionic intravenous contrast were obtained. 2-D and 3-D   reformats of head and neck CT angiographic data performed. Comparisons: None. Findings: A standard arch is present. No significant stenosis of the origin of   the great vessels evident. Right carotid artery: No stenoses of the right common carotid artery evident. There is no stenosis of the origin of the right internal carotid artery by   NASCET criteria. Left carotid artery: No stenoses of the left common carotid artery evident. There is no stenosis of the origin of the left internal carotid artery by NASCET   criteria. The vertebral arteries are codominant without focal stenosis. IMPRESSION:    1. No stenosis of the origin of the right internal carotid artery by NASCET   criteria. 2. No stenosis of the origin of the left internal carotid artery by NASCET   criteria. This document has been prepared by the Dragon voice recognition system, typographical errors may have occurred.  Attempts have been made to correct errors, however inadvertent errors may persist.

## 2021-09-04 NOTE — PROGRESS NOTES
Problem: Self Care Deficits Care Plan (Adult)  Goal: *Acute Goals and Plan of Care (Insert Text)  Description: Pt will be independent sup<->sit in prep for EOB ADL's  Pt will be independent  LB dressing sitting/standing level  Pt will be independent    sit<-> prep for toilet transfer  Pt will be independent    toilet transfer with LRAD  Pt will be independent    toileting/cloth mgmt LRAD  Pt will be independent   grooming standing sink  Pt will be independent   bathing sitting/standing sink LRAD  Pt will be MI luis UE HEP in prep for self care tasks      OCCUPATIONAL THERAPY TREATMENT  Patient: Evans Baum (15 y.o. male)  Date: 9/4/2021  Diagnosis: CVA (cerebral vascular accident) ([facility] [address] [I63.9] <principal problem not specified>       Precautions: FALL  Chart, occupational therapy assessment, plan of care, and goals were reviewed. ASSESSMENT  Patient continues with skilled OT services and is progressing towards goals. Patient received semi supine in bed upon OCHOA'S arrival, and agreeable to work with therapist. Patient much improved this visit since eval.. Nursing reporting BP at 129/67 which is also much improved. Patient talkative and understandable. Patient MOD I for bed mobility, supine to sit EOB and scooting. EOB, pt MOD I for luis socks. Presents good static.dynamic balance. STS with S and ambulate bed to bathroom sink without AD(gait belt only) to perform grooming (brush teeth, brush hair and wash hands). Stood at sink for ~8 minutes with no LOB. Patient S level bathroom to chair. Seated in chair, patient educated on and completed bilateral UE HEP to increase strength/endurance needed for ADL'S and functional transfers, see grid below. Nursing notified of headache and will bring meds. Patient would benefit from continued skilled Occupational services while at [facility] in order to increase safety and independence with self care and functional tranfers/mobility.  Recommend at discharge to [facility] when medically appropriate. Current Level of Function Impacting Discharge (ADLs): S for grooming in standing and functional tf's and MOD for LB dressing    Other factors to consider for discharge: Time of onset, medical prognosis/diagnosis, severity of deficits, PLOF, home environment, and family support         PLAN :  Patient continues to benefit from skilled intervention to address the above impairments. Continue treatment per established plan of care. to address goals. Recommend next OT session: activity tolerance    Recommendation for discharge: (in order for the patient to meet his/her long term goals)  HHOT    This discharge recommendation:  Has been made in collaboration with the attending provider and/or case management    IF patient discharges home will need the following DME: shower chair       SUBJECTIVE:   Patient stated I'm doing great now.     OBJECTIVE DATA SUMMARY:   Cognitive/Behavioral Status:  Neurologic State: Alert  Orientation Level: Oriented X4  Cognition: Appropriate for age attention/concentration; Follows commands; Appropriate decision making             Functional Mobility and Transfers for ADLs:  Bed Mobility:  Supine to Sit: Modified independent  Scooting: Modified independent    Transfers:  Sit to Stand: Supervision  Functional Transfers  Bathroom Mobility: Supervision/set up       Balance:  Sitting: Intact  Standing: Impaired  Standing - Static: Good  Standing - Dynamic : Good    ADL Intervention:       Grooming  Grooming Assistance: Supervision  Position Performed: Standing  Washing Hands: Supervision  Brushing Teeth: Supervision  Brushing/Combing Hair: Supervision         Lower Body Dressing Assistance  Dressing Assistance: Modified independent  Socks: Modified independent  Leg Crossed Method Used: Yes  Position Performed: Seated edge of bed         Therapeutic Exercises:   *  Exercise Sets Reps AROM AAROM PROM Self PROM Comments   Shoulder flex/ext 1 15 [x] [] [] [] Seated in chair, vc's for pacing and correct positioning   Elbow flex/ext 1 15 [x] [] [] []    Chest press 1 15 [x] [] [] []    Ceiling punches 1 15 [x] [] [] []          Pain:  8/10 for headache, nursing notified    Activity Tolerance:   Good  Please refer to the flowsheet for vital signs taken during this treatment. After treatment patient left in no apparent distress:   Sitting in chair and Call bell within reach    COMMUNICATION/COLLABORATION:   The patients plan of care was discussed with: Registered nurse.      GABRIELA Moffett  Time Calculation: 23 mins

## 2022-03-18 PROBLEM — Z72.0 TOBACCO ABUSE: Status: ACTIVE | Noted: 2020-11-02

## 2022-03-18 PROBLEM — I63.9 CVA (CEREBRAL VASCULAR ACCIDENT) (HCC): Status: ACTIVE | Noted: 2021-09-02

## 2022-03-19 PROBLEM — I48.91 ATRIAL FIBRILLATION WITH RVR (HCC): Status: ACTIVE | Noted: 2020-11-02

## 2023-01-07 ENCOUNTER — APPOINTMENT (OUTPATIENT)
Dept: GENERAL RADIOLOGY | Age: 63
End: 2023-01-07
Attending: EMERGENCY MEDICINE
Payer: COMMERCIAL

## 2023-01-07 ENCOUNTER — HOSPITAL ENCOUNTER (EMERGENCY)
Age: 63
Discharge: HOME | End: 2023-01-07
Attending: EMERGENCY MEDICINE
Payer: COMMERCIAL

## 2023-01-07 VITALS
RESPIRATION RATE: 22 BRPM | WEIGHT: 190 LBS | BODY MASS INDEX: 27.2 KG/M2 | TEMPERATURE: 99.4 F | OXYGEN SATURATION: 94 % | DIASTOLIC BLOOD PRESSURE: 75 MMHG | HEIGHT: 70 IN | SYSTOLIC BLOOD PRESSURE: 141 MMHG | HEART RATE: 71 BPM

## 2023-01-07 DIAGNOSIS — N39.0 URINARY TRACT INFECTION WITHOUT HEMATURIA, SITE UNSPECIFIED: ICD-10-CM

## 2023-01-07 DIAGNOSIS — U07.1 COVID-19: Primary | ICD-10-CM

## 2023-01-07 LAB
ALBUMIN SERPL-MCNC: 4 G/DL (ref 3.5–5)
ALBUMIN/GLOB SERPL: 0.9 (ref 1.1–2.2)
ALP SERPL-CCNC: 110 U/L (ref 45–117)
ALT SERPL-CCNC: 23 U/L (ref 12–78)
ANION GAP SERPL CALC-SCNC: 5 MMOL/L (ref 5–15)
APPEARANCE UR: ABNORMAL
AST SERPL W P-5'-P-CCNC: 29 U/L (ref 15–37)
BACTERIA URNS QL MICRO: ABNORMAL /HPF
BASOPHILS # BLD: 0 K/UL (ref 0–0.1)
BASOPHILS NFR BLD: 0 % (ref 0–1)
BILIRUB SERPL-MCNC: 0.5 MG/DL (ref 0.2–1)
BILIRUB UR QL: NEGATIVE
BUN SERPL-MCNC: 10 MG/DL (ref 6–20)
BUN/CREAT SERPL: 9 (ref 12–20)
CA-I BLD-MCNC: 9.1 MG/DL (ref 8.5–10.1)
CHLORIDE SERPL-SCNC: 96 MMOL/L (ref 97–108)
CO2 SERPL-SCNC: 30 MMOL/L (ref 21–32)
COLOR UR: ABNORMAL
COVID-19 RAPID TEST, COVR: DETECTED
CREAT SERPL-MCNC: 1.06 MG/DL (ref 0.7–1.3)
DIFFERENTIAL METHOD BLD: NORMAL
EOSINOPHIL # BLD: 0 K/UL (ref 0–0.4)
EOSINOPHIL NFR BLD: 0 % (ref 0–7)
EPITH CASTS URNS QL MICRO: ABNORMAL /LPF
ERYTHROCYTE [DISTWIDTH] IN BLOOD BY AUTOMATED COUNT: 13.3 % (ref 11.5–14.5)
FLUAV AG NPH QL IA: NEGATIVE
FLUBV AG NOSE QL IA: NEGATIVE
GLOBULIN SER CALC-MCNC: 4.4 G/DL (ref 2–4)
GLUCOSE SERPL-MCNC: 99 MG/DL (ref 65–100)
GLUCOSE UR STRIP.AUTO-MCNC: NEGATIVE MG/DL
HCT VFR BLD AUTO: 44.3 % (ref 36.6–50.3)
HGB BLD-MCNC: 14.5 G/DL (ref 12.1–17)
HGB UR QL STRIP: ABNORMAL
IMM GRANULOCYTES # BLD AUTO: 0 K/UL (ref 0–0.04)
IMM GRANULOCYTES NFR BLD AUTO: 0 % (ref 0–0.5)
KETONES UR QL STRIP.AUTO: 20 MG/DL
LEUKOCYTE ESTERASE UR QL STRIP.AUTO: ABNORMAL
LYMPHOCYTES # BLD: 1.1 K/UL (ref 0.8–3.5)
LYMPHOCYTES NFR BLD: 16 % (ref 12–49)
MCH RBC QN AUTO: 29.6 PG (ref 26–34)
MCHC RBC AUTO-ENTMCNC: 32.7 G/DL (ref 30–36.5)
MCV RBC AUTO: 90.4 FL (ref 80–99)
MONOCYTES # BLD: 0.8 K/UL (ref 0–1)
MONOCYTES NFR BLD: 12 % (ref 5–13)
MUCOUS THREADS URNS QL MICRO: ABNORMAL /LPF
NEUTS SEG # BLD: 5 K/UL (ref 1.8–8)
NEUTS SEG NFR BLD: 72 % (ref 32–75)
NITRITE UR QL STRIP.AUTO: POSITIVE
NRBC # BLD: 0 K/UL (ref 0–0.01)
NRBC BLD-RTO: 0 PER 100 WBC
PH UR STRIP: 5 (ref 5–8)
PLATELET # BLD AUTO: 261 K/UL (ref 150–400)
PMV BLD AUTO: 9.6 FL (ref 8.9–12.9)
POTASSIUM SERPL-SCNC: 4.1 MMOL/L (ref 3.5–5.1)
PROT SERPL-MCNC: 8.4 G/DL (ref 6.4–8.2)
PROT UR STRIP-MCNC: 100 MG/DL
RBC # BLD AUTO: 4.9 M/UL (ref 4.1–5.7)
RBC #/AREA URNS HPF: ABNORMAL /HPF (ref 0–5)
SODIUM SERPL-SCNC: 131 MMOL/L (ref 136–145)
SP GR UR REFRACTOMETRY: 1.02 (ref 1–1.03)
TROPONIN-HIGH SENSITIVITY: 10 NG/L (ref 0–76)
UA: UC IF INDICATED,UAUC: ABNORMAL
UROBILINOGEN UR QL STRIP.AUTO: 0.1 EU/DL (ref 0.1–1)
WBC # BLD AUTO: 7 K/UL (ref 4.1–11.1)
WBC URNS QL MICRO: >100 /HPF (ref 0–4)

## 2023-01-07 PROCEDURE — 74011000250 HC RX REV CODE- 250: Performed by: EMERGENCY MEDICINE

## 2023-01-07 PROCEDURE — 93005 ELECTROCARDIOGRAM TRACING: CPT

## 2023-01-07 PROCEDURE — 85025 COMPLETE CBC W/AUTO DIFF WBC: CPT

## 2023-01-07 PROCEDURE — 87804 INFLUENZA ASSAY W/OPTIC: CPT

## 2023-01-07 PROCEDURE — 87077 CULTURE AEROBIC IDENTIFY: CPT

## 2023-01-07 PROCEDURE — 99285 EMERGENCY DEPT VISIT HI MDM: CPT

## 2023-01-07 PROCEDURE — 80053 COMPREHEN METABOLIC PANEL: CPT

## 2023-01-07 PROCEDURE — 74011250637 HC RX REV CODE- 250/637: Performed by: EMERGENCY MEDICINE

## 2023-01-07 PROCEDURE — 87635 SARS-COV-2 COVID-19 AMP PRB: CPT

## 2023-01-07 PROCEDURE — 87186 SC STD MICRODIL/AGAR DIL: CPT

## 2023-01-07 PROCEDURE — 74011250636 HC RX REV CODE- 250/636: Performed by: EMERGENCY MEDICINE

## 2023-01-07 PROCEDURE — 81001 URINALYSIS AUTO W/SCOPE: CPT

## 2023-01-07 PROCEDURE — 84484 ASSAY OF TROPONIN QUANT: CPT

## 2023-01-07 PROCEDURE — 96374 THER/PROPH/DIAG INJ IV PUSH: CPT

## 2023-01-07 PROCEDURE — 36415 COLL VENOUS BLD VENIPUNCTURE: CPT

## 2023-01-07 PROCEDURE — 87086 URINE CULTURE/COLONY COUNT: CPT

## 2023-01-07 PROCEDURE — 71045 X-RAY EXAM CHEST 1 VIEW: CPT

## 2023-01-07 RX ORDER — ACETAMINOPHEN 500 MG
1000 TABLET ORAL ONCE
Status: COMPLETED | OUTPATIENT
Start: 2023-01-07 | End: 2023-01-07

## 2023-01-07 RX ORDER — CEPHALEXIN 500 MG/1
500 CAPSULE ORAL 3 TIMES DAILY
Qty: 30 CAPSULE | Refills: 0 | Status: SHIPPED | OUTPATIENT
Start: 2023-01-07 | End: 2023-01-17

## 2023-01-07 RX ADMIN — CEFTRIAXONE SODIUM 1 G: 1 INJECTION, POWDER, FOR SOLUTION INTRAMUSCULAR; INTRAVENOUS at 20:56

## 2023-01-07 RX ADMIN — ACETAMINOPHEN 1000 MG: 500 TABLET ORAL at 19:49

## 2023-01-07 RX ADMIN — SODIUM CHLORIDE 1000 ML: 9 INJECTION, SOLUTION INTRAVENOUS at 19:49

## 2023-01-07 NOTE — ED TRIAGE NOTES
Presents with weakness, fever, headache and sob that started last night. Shivering triage. Presents with soiled/wet(urine) clothing.

## 2023-01-08 LAB
ATRIAL RATE: 102 BPM
CALCULATED P AXIS, ECG09: 82 DEGREES
CALCULATED R AXIS, ECG10: 56 DEGREES
CALCULATED T AXIS, ECG11: 60 DEGREES
DIAGNOSIS, 93000: NORMAL
P-R INTERVAL, ECG05: 134 MS
Q-T INTERVAL, ECG07: 336 MS
QRS DURATION, ECG06: 92 MS
QTC CALCULATION (BEZET), ECG08: 437 MS
VENTRICULAR RATE, ECG03: 102 BPM

## 2023-01-08 NOTE — ED PROVIDER NOTES
EMERGENCY DEPARTMENT HISTORY AND PHYSICAL EXAM      Date: 1/7/2023  Patient Name: Clemente Diggs    History of Presenting Illness     Chief Complaint   Patient presents with    Shortness of Breath    Cold Symptoms    Fever    Lethargy    Headache     History Provided By: Patient    HPI: Clemente Diggs, 60 y.o. male with history of atrial fibrillation, bronchitis, and hypertension who presents with shortness of breath, fever, body ache, headaches, cough, chills, and feeling generalized weakness. There are no other complaints, changes, or physical findings at this time. PCP: None    Current Facility-Administered Medications   Medication Dose Route Frequency Provider Last Rate Last Admin    cefTRIAXone (ROCEPHIN) 1 g in sterile water (preservative free) 10 mL IV syringe  1 g IntraVENous NOW [place] Malady, DO         Current Outpatient Medications   Medication Sig Dispense Refill    cephALEXin (Keflex) 500 mg capsule Take 1 Capsule by mouth three (3) times daily for 10 days. 30 Capsule 0    amLODIPine (NORVASC) 5 mg tablet Take 1 Tablet by mouth daily. Indications: high blood pressure 30 Tablet 0    aspirin (ASPIRIN) 325 mg tablet Take 1 Tablet by mouth daily. 30 Tablet 0    atorvastatin (LIPITOR) 40 mg tablet Take 1 Tablet by mouth nightly. 60 Tablet 0    amiodarone (CORDARONE) 200 mg tablet Take 1 Tab by mouth every twelve (12) hours every twelve (12) hours. 60 Tab 0    apixaban (ELIQUIS) 5 mg tablet Take 1 Tab by mouth two (2) times a day. 61 Tab 0       Past History   Past Medical History:  Past Medical History:   Diagnosis Date    A-fib ([facility] [address]     Bronchitis     Hypertension         Past Surgical History:  No past surgical history on file. Family History:  No family history on file.     Social History:  Social History     Tobacco Use    Smoking status: Every Day     Packs/day: 1.50     Types: Cigarettes    Smokeless tobacco: Never   Substance Use Topics    Alcohol use: Yes     Comment: weekly    Drug use: Yes     Types: Cocaine       Allergies:  No Known Allergies     Review of Systems   Review of Systems   10 point review of systems completed otherwise negative other than stated in the HPI. Physical Exam   Constitutional: Patient's hands are very dirty. His feet are slightly unclean as well. He appears disheveled. Well-nourished. Skin: No rash. ENT: No rhinorrhea. No cough. Head is normocephalic and atraumatic. Eye: No proptosis or conjunctival injections. Respiratory: No apparent respiratory distress. Lung sounds are clear. Gastrointestinal: Nondistended. Musculoskeletal: No obvious bony deformities. Cardiac: Regular rate and rhythm. 2+ radial pulses. No murmurs. Lab and Diagnostic Study Results   Labs -     Recent Results (from the past 12 hour(s))   CBC WITH AUTOMATED DIFF    Collection Time: 01/07/23  7:16 PM   Result Value Ref Range    WBC 7.0 4.1 - 11.1 K/uL    RBC 4.90 4. 10 - 5.70 M/uL    HGB 14.5 12.1 - 17.0 g/dL    HCT 44.3 36.6 - 50.3 %    MCV 90.4 80.0 - 99.0 FL    MCH 29.6 26.0 - 34.0 PG    MCHC 32.7 30.0 - 36.5 g/dL    RDW 13.3 11.5 - 14.5 %    PLATELET 983 987 - 829 K/uL    MPV 9.6 8.9 - 12.9 FL    NRBC 0.0 0.0  WBC    ABSOLUTE NRBC 0.00 0.00 - 0.01 K/uL    NEUTROPHILS 72 32 - 75 %    LYMPHOCYTES 16 12 - 49 %    MONOCYTES 12 5 - 13 %    EOSINOPHILS 0 0 - 7 %    BASOPHILS 0 0 - 1 %    IMMATURE GRANULOCYTES 0 0 - 0.5 %    ABS. NEUTROPHILS 5.0 1.8 - 8.0 K/UL    ABS. LYMPHOCYTES 1.1 0.8 - 3.5 K/UL    ABS. MONOCYTES 0.8 0.0 - 1.0 K/UL    ABS. EOSINOPHILS 0.0 0.0 - 0.4 K/UL    ABS. BASOPHILS 0.0 0.0 - 0.1 K/UL    ABS. IMM.  GRANS. 0.0 0.00 - 0.04 K/UL    DF AUTOMATED     METABOLIC PANEL, COMPREHENSIVE    Collection Time: 01/07/23  7:16 PM   Result Value Ref Range    Sodium 131 (L) 136 - 145 mmol/L    Potassium 4.1 3.5 - 5.1 mmol/L    Chloride 96 (L) 97 - 108 mmol/L    CO2 30 21 - 32 mmol/L    Anion gap 5 5 - 15 mmol/L    Glucose 99 65 - 100 mg/dL    BUN 10 6 - 20 mg/dL Creatinine 1.06 0.70 - 1.30 mg/dL    BUN/Creatinine ratio 9 (L) 12 - 20      eGFR >60 >60 ml/min/1.73m2    Calcium 9.1 8.5 - 10.1 mg/dL    Bilirubin, total 0.5 0.2 - 1.0 mg/dL    AST (SGOT) 29 15 - 37 U/L    ALT (SGPT) 23 12 - 78 U/L    Alk.  phosphatase 110 45 - 117 U/L    Protein, total 8.4 (H) 6.4 - 8.2 g/dL    Albumin 4.0 3.5 - 5.0 g/dL    Globulin 4.4 (H) 2.0 - 4.0 g/dL    A-G Ratio 0.9 (L) 1.1 - 2.2     TROPONIN-HIGH SENSITIVITY    Collection Time: 01/07/23  7:16 PM   Result Value Ref Range    Troponin-High Sensitivity 10 0 - 76 ng/L   INFLUENZA A & B AG (RAPID TEST)    Collection Time: 01/07/23  7:54 PM   Result Value Ref Range    Influenza A Antigen Negative Negative      Influenza B Antigen Negative Negative     COVID-19 RAPID TEST    Collection Time: 01/07/23  7:54 PM   Result Value Ref Range    COVID-19 rapid test DETECTED (A) Not Detected     URINALYSIS W/ REFLEX CULTURE    Collection Time: 01/07/23  7:54 PM    Specimen: Urine   Result Value Ref Range    Color Yellow/Straw      Appearance Turbid (A) Clear      Specific gravity 1.025 1.003 - 1.030      pH (UA) 5.0 5.0 - 8.0      Protein 100 (A) Negative mg/dL    Glucose Negative Negative mg/dL    Ketone 20 (A) Negative mg/dL    Bilirubin Negative Negative      Blood Moderate (A) Negative      Urobilinogen 0.1 0.1 - 1.0 EU/dL    Nitrites Positive (A) Negative      Leukocyte Esterase Large (A) Negative      WBC >100 (H) 0 - 4 /hpf    RBC 10-20 0 - 5 /hpf    Epithelial cells Few Few /lpf    Bacteria 2+ (A) Negative /hpf    UA:UC IF INDICATED Urine Culture Ordered (A) Culture not indicated by UA result      Mucus Trace (A) Negative /lpf       Radiologic Studies -   [unfilled]  CT Results  (Last 48 hours)      None          CXR Results  (Last 48 hours)                 01/07/23 1920  XR CHEST PORT Final result    Impression:      No acute process on portable chest.           Narrative:  EXAM:  XR CHEST PORT       INDICATION: Shortness of breath COMPARISON: 9/2/2021       TECHNIQUE: portable chest AP view       FINDINGS: The cardiac silhouette is within normal limits. The pulmonary   vasculature is within normal limits. The lungs and pleural spaces are clear. The visualized bones and upper abdomen   are age-appropriate. Medical Decision Making and ED Course   - I am the first and primary provider for this patient AND AM THE PRIMARY PROVIDER OF RECORD. I reviewed the vital signs, available nursing notes, past medical history, past surgical history, family history and social history. - Initial assessment performed. The patients presenting problems have been discussed, and the staff are in agreement with the care plan formulated and outlined with them. I have encouraged them to ask questions as they arise throughout their visit. Vital Signs-Reviewed the patient's vital signs. Patient Vitals for the past 12 hrs:   Temp Pulse Resp BP SpO2   01/07/23 1953 -- 78 17 (!) 146/82 96 %   01/07/23 1849 98.3 °F (36.8 °C) (!) 101 17 (!) 132/100 95 %     MDM  Differential diagnosis: Influenza, COVID, UTI  Presented with viral like illness symptoms. I did do a broad work-up. He does look slightly disheveled. Urinalysis shows UTI. Chest x-ray shows no pneumonia. Labs reassuring. No leukocytosis. He was given a liter of normal saline. I will give him 1 g of Rocephin prior to discharge. He does not appear to be septic. Vital signs are normal.    Patient states he feels comfortable going home. Will prescribe Keflex. Disposition     Disposition: Discharged    DISCHARGE PLAN:  1. Current Discharge Medication List        CONTINUE these medications which have NOT CHANGED    Details   amLODIPine (NORVASC) 5 mg tablet Take 1 Tablet by mouth daily. Indications: high blood pressure  Qty: 30 Tablet, Refills: 0      aspirin (ASPIRIN) 325 mg tablet Take 1 Tablet by mouth daily.   Qty: 30 Tablet, Refills: 0      atorvastatin (LIPITOR) 40 mg tablet Take 1 Tablet by mouth nightly. Qty: 60 Tablet, Refills: 0      amiodarone (CORDARONE) 200 mg tablet Take 1 Tab by mouth every twelve (12) hours every twelve (12) hours. Qty: 60 Tab, Refills: 0      apixaban (ELIQUIS) 5 mg tablet Take 1 Tab by mouth two (2) times a day. Qty: 60 Tab, Refills: 0           2. Follow-up Information       Follow up With Specialties Details Why Contact Info    Primary care doctor  Schedule an appointment as soon as possible for a visit in 3 days      [facility] EMERGENCY DEPT Emergency Medicine Go today As soon as possible if symptoms worsen [address] [address]  [phone]          3. Return to ED if worse   4. Current Discharge Medication List        START taking these medications    Details   cephALEXin (Keflex) 500 mg capsule Take 1 Capsule by mouth three (3) times daily for 10 days. Qty: 30 Capsule, Refills: 0  Start date: 1/7/2023, End date: 1/17/2023              Diagnosis/Clinical Impression     Clinical Impression:   1. COVID-19    2. Urinary tract infection without hematuria, site unspecified         Attestations:  Alyssa Oh, DO    Please note that this dictation was completed with [employer], the computer voice recognition software. Quite often unanticipated grammatical, syntax, homophones, and other interpretive errors are inadvertently transcribed by the computer software. Please disregard these errors. Please excuse any errors that have escaped final proofreading. Thank you.

## 2023-01-10 LAB
BACTERIA SPEC CULT: ABNORMAL
COLONY COUNT,CNT: ABNORMAL
SPECIAL REQUESTS,SREQ: ABNORMAL

## 2024-01-26 ENCOUNTER — HOSPITAL ENCOUNTER (INPATIENT)
Facility: HOSPITAL | Age: 64
LOS: 1 days | Discharge: HOME | DRG: 383 | End: 2024-01-26
Attending: EMERGENCY MEDICINE
Payer: COMMERCIAL

## 2024-01-26 VITALS
SYSTOLIC BLOOD PRESSURE: 166 MMHG | WEIGHT: 190 LBS | HEIGHT: 70 IN | DIASTOLIC BLOOD PRESSURE: 95 MMHG | HEART RATE: 83 BPM | TEMPERATURE: 98.4 F | BODY MASS INDEX: 27.2 KG/M2 | RESPIRATION RATE: 18 BRPM | OXYGEN SATURATION: 98 %

## 2024-01-26 DIAGNOSIS — L03.90 CELLULITIS, UNSPECIFIED CELLULITIS SITE: Primary | ICD-10-CM

## 2024-01-26 PROBLEM — D57.00 SICKLE CELL CRISIS (HCC): Status: ACTIVE | Noted: 2024-01-26

## 2024-01-26 PROCEDURE — 1100000000 HC RM PRIVATE

## 2024-01-26 PROCEDURE — 99283 EMERGENCY DEPT VISIT LOW MDM: CPT

## 2024-01-26 PROCEDURE — 6370000000 HC RX 637 (ALT 250 FOR IP): Performed by: EMERGENCY MEDICINE

## 2024-01-26 RX ORDER — PREDNISONE 20 MG/1
TABLET ORAL
Qty: 12 TABLET | Refills: 1 | Status: SHIPPED | OUTPATIENT
Start: 2024-01-26

## 2024-01-26 RX ORDER — CLINDAMYCIN HYDROCHLORIDE 150 MG/1
300 CAPSULE ORAL
Status: COMPLETED | OUTPATIENT
Start: 2024-01-26 | End: 2024-01-26

## 2024-01-26 RX ORDER — PREDNISONE 20 MG/1
60 TABLET ORAL
Status: COMPLETED | OUTPATIENT
Start: 2024-01-26 | End: 2024-01-26

## 2024-01-26 RX ORDER — CLINDAMYCIN HYDROCHLORIDE 300 MG/1
300 CAPSULE ORAL 3 TIMES DAILY
Qty: 21 CAPSULE | Refills: 0 | Status: SHIPPED | OUTPATIENT
Start: 2024-01-26 | End: 2024-02-02

## 2024-01-26 RX ADMIN — PREDNISONE 60 MG: 20 TABLET ORAL at 23:38

## 2024-01-26 RX ADMIN — CLINDAMYCIN HYDROCHLORIDE 300 MG: 150 CAPSULE ORAL at 23:38

## 2024-01-26 ASSESSMENT — PAIN - FUNCTIONAL ASSESSMENT: PAIN_FUNCTIONAL_ASSESSMENT: NONE - DENIES PAIN

## 2024-01-27 NOTE — ED PROVIDER NOTES
EMERGENCY DEPARTMENT HISTORY AND PHYSICAL EXAM    Date: 1/26/2024  Patient Name: Jean Paul Moyer    History of Presenting Illness     Chief Complaint   Patient presents with    Insect Bite       History Provided By: Patient    HPI: Jean Paul Moyer, 63 y.o. male   presents to the ED with cc of insect bite.  Patient complains of possible insect bite to his right hand that occurred yesterday.  Patient is unsure what insect.  Patient is concern for possible spider bite.  Patient complains of mild swelling over the right hand.  No pain.  No red streaking.  No fever or chills.  Tetanus is up-to-date      PCP: Odilon Mahan MD    No current facility-administered medications on file prior to encounter.     Current Outpatient Medications on File Prior to Encounter   Medication Sig Dispense Refill    amiodarone (CORDARONE) 200 MG tablet Take 1 tablet by mouth in the morning and 1 tablet in the evening.      amLODIPine (NORVASC) 5 MG tablet Take 1 tablet by mouth daily      apixaban (ELIQUIS) 5 MG TABS tablet Take 1 tablet by mouth 2 times daily      aspirin 325 MG tablet Take 1 tablet by mouth daily      atorvastatin (LIPITOR) 40 MG tablet Take 1 tablet by mouth nightly         Past History     Past Medical History:  Past Medical History:   Diagnosis Date    A-fib (HCC)     Bronchitis     Hypertension        Past Surgical History:  History reviewed. No pertinent surgical history.    Family History:  History reviewed. No pertinent family history.    Social History:  Social History     Tobacco Use    Smoking status: Every Day     Current packs/day: 1.50     Types: Cigarettes    Smokeless tobacco: Never   Substance Use Topics    Alcohol use: Yes    Drug use: Yes     Types: Cocaine       Allergies:  No Known Allergies      Review of Systems       Physical Exam   Physical Exam  Vitals and nursing note reviewed.   Constitutional:       General: He is not in acute distress.     Appearance: Normal appearance. He is not

## 2024-02-24 ENCOUNTER — APPOINTMENT (OUTPATIENT)
Facility: HOSPITAL | Age: 64
End: 2024-02-24
Payer: COMMERCIAL

## 2024-02-24 ENCOUNTER — HOSPITAL ENCOUNTER (EMERGENCY)
Facility: HOSPITAL | Age: 64
Discharge: HOME | End: 2024-02-24
Payer: COMMERCIAL

## 2024-02-24 VITALS
TEMPERATURE: 98 F | BODY MASS INDEX: 28.63 KG/M2 | SYSTOLIC BLOOD PRESSURE: 152 MMHG | RESPIRATION RATE: 17 BRPM | HEIGHT: 70 IN | HEART RATE: 86 BPM | OXYGEN SATURATION: 97 % | DIASTOLIC BLOOD PRESSURE: 89 MMHG | WEIGHT: 200 LBS

## 2024-02-24 DIAGNOSIS — M19.041 OSTEOARTHRITIS OF METACARPOPHALANGEAL (MCP) JOINT OF RIGHT THUMB: Primary | ICD-10-CM

## 2024-02-24 DIAGNOSIS — R20.2 PARESTHESIA OF THUMB OF RIGHT HAND: ICD-10-CM

## 2024-02-24 DIAGNOSIS — M19.041 OSTEOARTHRITIS OF RIGHT HAND, UNSPECIFIED OSTEOARTHRITIS TYPE: ICD-10-CM

## 2024-02-24 LAB
ANION GAP SERPL CALC-SCNC: 5 MMOL/L (ref 5–15)
BASOPHILS # BLD: 0 K/UL (ref 0–0.1)
BASOPHILS NFR BLD: 0 % (ref 0–1)
BUN SERPL-MCNC: 16 MG/DL (ref 6–20)
BUN/CREAT SERPL: 18 (ref 12–20)
CA-I BLD-MCNC: 9.1 MG/DL (ref 8.5–10.1)
CHLORIDE SERPL-SCNC: 105 MMOL/L (ref 97–108)
CO2 SERPL-SCNC: 29 MMOL/L (ref 21–32)
CREAT SERPL-MCNC: 0.88 MG/DL (ref 0.7–1.3)
DIFFERENTIAL METHOD BLD: NORMAL
EOSINOPHIL # BLD: 0.3 K/UL (ref 0–0.4)
EOSINOPHIL NFR BLD: 5 % (ref 0–7)
ERYTHROCYTE [DISTWIDTH] IN BLOOD BY AUTOMATED COUNT: 13.3 % (ref 11.5–14.5)
GLUCOSE SERPL-MCNC: 82 MG/DL (ref 65–100)
HCT VFR BLD AUTO: 43.1 % (ref 36.6–50.3)
HGB BLD-MCNC: 14.3 G/DL (ref 12.1–17)
IMM GRANULOCYTES # BLD AUTO: 0 K/UL (ref 0–0.04)
IMM GRANULOCYTES NFR BLD AUTO: 0 % (ref 0–0.5)
LYMPHOCYTES # BLD: 2.1 K/UL (ref 0.8–3.5)
LYMPHOCYTES NFR BLD: 30 % (ref 12–49)
MCH RBC QN AUTO: 30.4 PG (ref 26–34)
MCHC RBC AUTO-ENTMCNC: 33.2 G/DL (ref 30–36.5)
MCV RBC AUTO: 91.5 FL (ref 80–99)
MONOCYTES # BLD: 0.5 K/UL (ref 0–1)
MONOCYTES NFR BLD: 8 % (ref 5–13)
NEUTS SEG # BLD: 3.9 K/UL (ref 1.8–8)
NEUTS SEG NFR BLD: 57 % (ref 32–75)
PLATELET # BLD AUTO: 335 K/UL (ref 150–400)
PMV BLD AUTO: 9.1 FL (ref 8.9–12.9)
POTASSIUM SERPL-SCNC: 4.3 MMOL/L (ref 3.5–5.1)
RBC # BLD AUTO: 4.71 M/UL (ref 4.1–5.7)
SODIUM SERPL-SCNC: 139 MMOL/L (ref 136–145)
URATE SERPL-MCNC: 4 MG/DL (ref 3.5–7.2)
WBC # BLD AUTO: 6.8 K/UL (ref 4.1–11.1)

## 2024-02-24 PROCEDURE — 80048 BASIC METABOLIC PNL TOTAL CA: CPT

## 2024-02-24 PROCEDURE — 73130 X-RAY EXAM OF HAND: CPT

## 2024-02-24 PROCEDURE — 85025 COMPLETE CBC W/AUTO DIFF WBC: CPT

## 2024-02-24 PROCEDURE — 84550 ASSAY OF BLOOD/URIC ACID: CPT

## 2024-02-24 PROCEDURE — 6360000002 HC RX W HCPCS: Performed by: PHYSICIAN ASSISTANT

## 2024-02-24 PROCEDURE — 29125 APPL SHORT ARM SPLINT STATIC: CPT

## 2024-02-24 PROCEDURE — 99284 EMERGENCY DEPT VISIT MOD MDM: CPT

## 2024-02-24 PROCEDURE — 36415 COLL VENOUS BLD VENIPUNCTURE: CPT

## 2024-02-24 PROCEDURE — 2580000003 HC RX 258: Performed by: PHYSICIAN ASSISTANT

## 2024-02-24 PROCEDURE — 96374 THER/PROPH/DIAG INJ IV PUSH: CPT

## 2024-02-24 RX ORDER — KETOROLAC TROMETHAMINE 30 MG/ML
30 INJECTION, SOLUTION INTRAMUSCULAR; INTRAVENOUS
Status: COMPLETED | OUTPATIENT
Start: 2024-02-24 | End: 2024-02-24

## 2024-02-24 RX ORDER — MELOXICAM 10 MG/1
10 CAPSULE ORAL DAILY
Qty: 30 CAPSULE | Refills: 0 | Status: SHIPPED | OUTPATIENT
Start: 2024-02-24

## 2024-02-24 RX ORDER — 0.9 % SODIUM CHLORIDE 0.9 %
500 INTRAVENOUS SOLUTION INTRAVENOUS
Status: COMPLETED | OUTPATIENT
Start: 2024-02-24 | End: 2024-02-24

## 2024-02-24 RX ORDER — TRAMADOL HYDROCHLORIDE 50 MG/1
50 TABLET ORAL EVERY 6 HOURS PRN
Qty: 12 TABLET | Refills: 0 | Status: SHIPPED | OUTPATIENT
Start: 2024-02-24 | End: 2024-02-27

## 2024-02-24 RX ADMIN — SODIUM CHLORIDE 500 ML: 9 INJECTION, SOLUTION INTRAVENOUS at 11:03

## 2024-02-24 RX ADMIN — KETOROLAC TROMETHAMINE 30 MG: 30 INJECTION INTRAMUSCULAR; INTRAVENOUS at 11:03

## 2024-02-24 ASSESSMENT — PAIN SCALES - GENERAL: PAINLEVEL_OUTOF10: 5

## 2024-02-24 ASSESSMENT — PAIN - FUNCTIONAL ASSESSMENT: PAIN_FUNCTIONAL_ASSESSMENT: 0-10

## 2024-02-24 NOTE — ED PROVIDER NOTES
[facility] EMERGENCY DEPT  EMERGENCY DEPARTMENT HISTORY AND PHYSICAL EXAM      Date: 2/24/2024  Patient Name: Jean Paul Moyer  MRN: [id number]  YOB: 1960  Date of evaluation: 2/24/2024  Provider: Bryce Nguyễn PA-C   Note Started: 10:13 AM EST 2/24/24    HISTORY OF PRESENT ILLNESS     Chief Complaint   Patient presents with    hand swelling       History Provided By: Patient    HPI: Jean Paul Moyer is a 63 y.o. male with a past medical history significant for HTN and A-fib who presents this ED with cc of hand pain.  Patient reports a 1 month history of right hand pain and swelling.  Was previously seen in this ED on 1/26/2024 and was felt to be related to cellulitis.  Has since finished a course of clindamycin with no improvement.  Denies any precipitating injury or trauma.  Patient does report increased red meat and alcohol consumption.  No fevers or chills.  Patient does also note increased warmth to the hand.  Denies any prior history of similar episodes.  Patient still denies any chest pain, shortness of breath, palpitations, abdominal pain, nausea, vomiting, changes in bowel or bladder habits, headaches, lightheadedness, dizziness, numbness, weakness, diaphoresis, rash.    PAST MEDICAL HISTORY   Past Medical History:  Past Medical History:   Diagnosis Date    A-fib ([facility])     Dr. Leggett is cardiologist    Bronchitis     Hypertension        Past Surgical History:  History reviewed. No pertinent surgical history.    Family History:  History reviewed. No pertinent family history.    Social History:  Social History     Tobacco Use    Smoking status: Every Day     Current packs/day: 1.50     Types: Cigarettes    Smokeless tobacco: Never   Substance Use Topics    Alcohol use: Yes     Comment: ocasionally    Drug use: Not Currently     Types: Cocaine       Allergies:  No Known Allergies    PCP: Odilon Mahan MD    Current Meds:   No current facility-administered medications for this encounter.     Current

## 2024-02-24 NOTE — ED TRIAGE NOTES
Pt states that he came her a few weeks ago for what he thought was a  spider bite to right hand . Was given abx.. took it all and still has swelling to his right hand.

## 2024-02-25 RX ORDER — KETOROLAC TROMETHAMINE 10 MG/1
10 TABLET, FILM COATED ORAL EVERY 6 HOURS PRN
Qty: 20 TABLET | Refills: 0 | Status: SHIPPED | OUTPATIENT
Start: 2024-02-25

## 2025-01-18 ENCOUNTER — APPOINTMENT (OUTPATIENT)
Facility: HOSPITAL | Age: 65
DRG: 309 | End: 2025-01-18
Payer: MEDICARE

## 2025-01-18 ENCOUNTER — HOSPITAL ENCOUNTER (INPATIENT)
Facility: HOSPITAL | Age: 65
LOS: 6 days | Discharge: HOME | DRG: 309 | End: 2025-01-24
Attending: STUDENT IN AN ORGANIZED HEALTH CARE EDUCATION/TRAINING PROGRAM | Admitting: INTERNAL MEDICINE
Payer: MEDICARE

## 2025-01-18 DIAGNOSIS — I48.0 PAROXYSMAL ATRIAL FIBRILLATION (HCC): ICD-10-CM

## 2025-01-18 DIAGNOSIS — R06.00 DYSPNEA, UNSPECIFIED TYPE: ICD-10-CM

## 2025-01-18 DIAGNOSIS — I50.9 CONGESTIVE HEART FAILURE, UNSPECIFIED HF CHRONICITY, UNSPECIFIED HEART FAILURE TYPE (HCC): ICD-10-CM

## 2025-01-18 DIAGNOSIS — J90 PLEURAL EFFUSION: ICD-10-CM

## 2025-01-18 DIAGNOSIS — I48.91 ATRIAL FIBRILLATION WITH RVR (HCC): ICD-10-CM

## 2025-01-18 DIAGNOSIS — R07.9 CHEST PAIN, UNSPECIFIED TYPE: Primary | ICD-10-CM

## 2025-01-18 LAB
ANION GAP SERPL CALC-SCNC: 5 MMOL/L (ref 2–12)
BASOPHILS # BLD: 0.03 K/UL (ref 0–0.1)
BASOPHILS NFR BLD: 0.3 % (ref 0–1)
BNP SERPL-MCNC: 2673 PG/ML
BUN SERPL-MCNC: 16 MG/DL (ref 6–20)
BUN/CREAT SERPL: 15 (ref 12–20)
CA-I BLD-MCNC: 9.3 MG/DL (ref 8.5–10.1)
CHLORIDE SERPL-SCNC: 103 MMOL/L (ref 97–108)
CO2 SERPL-SCNC: 29 MMOL/L (ref 21–32)
CREAT SERPL-MCNC: 1.07 MG/DL (ref 0.7–1.3)
DIFFERENTIAL METHOD BLD: ABNORMAL
EOSINOPHIL # BLD: 0.18 K/UL (ref 0–0.4)
EOSINOPHIL NFR BLD: 1.7 % (ref 0–7)
ERYTHROCYTE [DISTWIDTH] IN BLOOD BY AUTOMATED COUNT: 14.4 % (ref 11.5–14.5)
FLUAV RNA SPEC QL NAA+PROBE: NOT DETECTED
FLUBV RNA SPEC QL NAA+PROBE: NOT DETECTED
GLUCOSE SERPL-MCNC: 136 MG/DL (ref 65–100)
HCT VFR BLD AUTO: 45.2 % (ref 36.6–50.3)
HGB BLD-MCNC: 14.5 G/DL (ref 12.1–17)
IMM GRANULOCYTES # BLD AUTO: 0.01 K/UL (ref 0–0.04)
IMM GRANULOCYTES NFR BLD AUTO: 0.1 % (ref 0–0.5)
LYMPHOCYTES # BLD: 2.7 K/UL (ref 0.8–3.5)
LYMPHOCYTES NFR BLD: 25.4 % (ref 12–49)
MAGNESIUM SERPL-MCNC: 1.9 MG/DL (ref 1.6–2.4)
MCH RBC QN AUTO: 29.1 PG (ref 26–34)
MCHC RBC AUTO-ENTMCNC: 32.1 G/DL (ref 30–36.5)
MCV RBC AUTO: 90.6 FL (ref 80–99)
MONOCYTES # BLD: 0.58 K/UL (ref 0–1)
MONOCYTES NFR BLD: 5.5 % (ref 5–13)
NEUTS SEG # BLD: 7.12 K/UL (ref 1.8–8)
NEUTS SEG NFR BLD: 67 % (ref 32–75)
PLATELET # BLD AUTO: 428 K/UL (ref 150–400)
PMV BLD AUTO: 9.5 FL (ref 8.9–12.9)
POTASSIUM SERPL-SCNC: 4.3 MMOL/L (ref 3.5–5.1)
RBC # BLD AUTO: 4.99 M/UL (ref 4.1–5.7)
SARS-COV-2 RNA RESP QL NAA+PROBE: NOT DETECTED
SODIUM SERPL-SCNC: 137 MMOL/L (ref 136–145)
T3FREE SERPL-MCNC: 2.2 PG/ML (ref 2.2–4)
T4 FREE SERPL-MCNC: 0.8 NG/DL (ref 0.8–1.5)
TROPONIN I SERPL HS-MCNC: 12 NG/L (ref 0–76)
TROPONIN I SERPL HS-MCNC: 14 NG/L (ref 0–76)
TSH SERPL DL<=0.05 MIU/L-ACNC: 6.77 UIU/ML (ref 0.36–3.74)
WBC # BLD AUTO: 10.6 K/UL (ref 4.1–11.1)

## 2025-01-18 PROCEDURE — 85025 COMPLETE CBC W/AUTO DIFF WBC: CPT

## 2025-01-18 PROCEDURE — 94761 N-INVAS EAR/PLS OXIMETRY MLT: CPT

## 2025-01-18 PROCEDURE — 96361 HYDRATE IV INFUSION ADD-ON: CPT

## 2025-01-18 PROCEDURE — 2060000000 HC ICU INTERMEDIATE R&B

## 2025-01-18 PROCEDURE — 80048 BASIC METABOLIC PNL TOTAL CA: CPT

## 2025-01-18 PROCEDURE — 84439 ASSAY OF FREE THYROXINE: CPT

## 2025-01-18 PROCEDURE — 2500000003 HC RX 250 WO HCPCS: Performed by: INTERNAL MEDICINE

## 2025-01-18 PROCEDURE — 36415 COLL VENOUS BLD VENIPUNCTURE: CPT

## 2025-01-18 PROCEDURE — 99285 EMERGENCY DEPT VISIT HI MDM: CPT

## 2025-01-18 PROCEDURE — 96374 THER/PROPH/DIAG INJ IV PUSH: CPT

## 2025-01-18 PROCEDURE — 6360000002 HC RX W HCPCS: Performed by: EMERGENCY MEDICINE

## 2025-01-18 PROCEDURE — 2500000003 HC RX 250 WO HCPCS: Performed by: STUDENT IN AN ORGANIZED HEALTH CARE EDUCATION/TRAINING PROGRAM

## 2025-01-18 PROCEDURE — 87636 SARSCOV2 & INF A&B AMP PRB: CPT

## 2025-01-18 PROCEDURE — 2580000003 HC RX 258: Performed by: STUDENT IN AN ORGANIZED HEALTH CARE EDUCATION/TRAINING PROGRAM

## 2025-01-18 PROCEDURE — 93005 ELECTROCARDIOGRAM TRACING: CPT | Performed by: STUDENT IN AN ORGANIZED HEALTH CARE EDUCATION/TRAINING PROGRAM

## 2025-01-18 PROCEDURE — 83735 ASSAY OF MAGNESIUM: CPT

## 2025-01-18 PROCEDURE — 84481 FREE ASSAY (FT-3): CPT

## 2025-01-18 PROCEDURE — 84443 ASSAY THYROID STIM HORMONE: CPT

## 2025-01-18 PROCEDURE — 83880 ASSAY OF NATRIURETIC PEPTIDE: CPT

## 2025-01-18 PROCEDURE — 71275 CT ANGIOGRAPHY CHEST: CPT

## 2025-01-18 PROCEDURE — 96375 TX/PRO/DX INJ NEW DRUG ADDON: CPT

## 2025-01-18 PROCEDURE — 71045 X-RAY EXAM CHEST 1 VIEW: CPT

## 2025-01-18 PROCEDURE — 84484 ASSAY OF TROPONIN QUANT: CPT

## 2025-01-18 PROCEDURE — 6370000000 HC RX 637 (ALT 250 FOR IP): Performed by: INTERNAL MEDICINE

## 2025-01-18 PROCEDURE — 6360000004 HC RX CONTRAST MEDICATION: Performed by: STUDENT IN AN ORGANIZED HEALTH CARE EDUCATION/TRAINING PROGRAM

## 2025-01-18 PROCEDURE — 81001 URINALYSIS AUTO W/SCOPE: CPT

## 2025-01-18 RX ORDER — 0.9 % SODIUM CHLORIDE 0.9 %
500 INTRAVENOUS SOLUTION INTRAVENOUS
Status: COMPLETED | OUTPATIENT
Start: 2025-01-18 | End: 2025-01-18

## 2025-01-18 RX ORDER — FUROSEMIDE 10 MG/ML
60 INJECTION INTRAMUSCULAR; INTRAVENOUS ONCE
Status: COMPLETED | OUTPATIENT
Start: 2025-01-18 | End: 2025-01-18

## 2025-01-18 RX ORDER — ASPIRIN 325 MG
325 TABLET ORAL DAILY
Status: DISCONTINUED | OUTPATIENT
Start: 2025-01-19 | End: 2025-01-24 | Stop reason: HOSPADM

## 2025-01-18 RX ORDER — ONDANSETRON 2 MG/ML
4 INJECTION INTRAMUSCULAR; INTRAVENOUS EVERY 6 HOURS PRN
Status: DISCONTINUED | OUTPATIENT
Start: 2025-01-18 | End: 2025-01-24 | Stop reason: HOSPADM

## 2025-01-18 RX ORDER — AMIODARONE HYDROCHLORIDE 200 MG/1
200 TABLET ORAL EVERY 12 HOURS
Status: DISCONTINUED | OUTPATIENT
Start: 2025-01-18 | End: 2025-01-20

## 2025-01-18 RX ORDER — METOPROLOL TARTRATE 1 MG/ML
5 INJECTION, SOLUTION INTRAVENOUS ONCE
Status: COMPLETED | OUTPATIENT
Start: 2025-01-18 | End: 2025-01-18

## 2025-01-18 RX ORDER — SODIUM CHLORIDE 0.9 % (FLUSH) 0.9 %
5-40 SYRINGE (ML) INJECTION PRN
Status: DISCONTINUED | OUTPATIENT
Start: 2025-01-18 | End: 2025-01-24 | Stop reason: HOSPADM

## 2025-01-18 RX ORDER — ATORVASTATIN CALCIUM 40 MG/1
40 TABLET, FILM COATED ORAL NIGHTLY
Status: DISCONTINUED | OUTPATIENT
Start: 2025-01-18 | End: 2025-01-24 | Stop reason: HOSPADM

## 2025-01-18 RX ORDER — POTASSIUM CHLORIDE 7.45 MG/ML
10 INJECTION INTRAVENOUS PRN
Status: DISCONTINUED | OUTPATIENT
Start: 2025-01-18 | End: 2025-01-24 | Stop reason: HOSPADM

## 2025-01-18 RX ORDER — POLYETHYLENE GLYCOL 3350 17 G/17G
17 POWDER, FOR SOLUTION ORAL DAILY PRN
Status: DISCONTINUED | OUTPATIENT
Start: 2025-01-18 | End: 2025-01-24 | Stop reason: HOSPADM

## 2025-01-18 RX ORDER — MAGNESIUM SULFATE IN WATER 40 MG/ML
2000 INJECTION, SOLUTION INTRAVENOUS PRN
Status: DISCONTINUED | OUTPATIENT
Start: 2025-01-18 | End: 2025-01-24 | Stop reason: HOSPADM

## 2025-01-18 RX ORDER — SODIUM CHLORIDE 9 MG/ML
INJECTION, SOLUTION INTRAVENOUS PRN
Status: DISCONTINUED | OUTPATIENT
Start: 2025-01-18 | End: 2025-01-24 | Stop reason: HOSPADM

## 2025-01-18 RX ORDER — POTASSIUM CHLORIDE 1500 MG/1
40 TABLET, EXTENDED RELEASE ORAL PRN
Status: DISCONTINUED | OUTPATIENT
Start: 2025-01-18 | End: 2025-01-24 | Stop reason: HOSPADM

## 2025-01-18 RX ORDER — ONDANSETRON 4 MG/1
4 TABLET, ORALLY DISINTEGRATING ORAL EVERY 8 HOURS PRN
Status: DISCONTINUED | OUTPATIENT
Start: 2025-01-18 | End: 2025-01-24 | Stop reason: HOSPADM

## 2025-01-18 RX ORDER — DILTIAZEM HYDROCHLORIDE 5 MG/ML
20 INJECTION INTRAVENOUS
Status: COMPLETED | OUTPATIENT
Start: 2025-01-18 | End: 2025-01-18

## 2025-01-18 RX ORDER — SODIUM CHLORIDE 0.9 % (FLUSH) 0.9 %
5-40 SYRINGE (ML) INJECTION EVERY 12 HOURS SCHEDULED
Status: DISCONTINUED | OUTPATIENT
Start: 2025-01-18 | End: 2025-01-24 | Stop reason: HOSPADM

## 2025-01-18 RX ORDER — ACETAMINOPHEN 650 MG/1
650 SUPPOSITORY RECTAL EVERY 6 HOURS PRN
Status: DISCONTINUED | OUTPATIENT
Start: 2025-01-18 | End: 2025-01-24 | Stop reason: HOSPADM

## 2025-01-18 RX ORDER — AMLODIPINE BESYLATE 5 MG/1
5 TABLET ORAL DAILY
Status: DISCONTINUED | OUTPATIENT
Start: 2025-01-19 | End: 2025-01-20

## 2025-01-18 RX ORDER — ACETAMINOPHEN 325 MG/1
650 TABLET ORAL EVERY 6 HOURS PRN
Status: DISCONTINUED | OUTPATIENT
Start: 2025-01-18 | End: 2025-01-24 | Stop reason: HOSPADM

## 2025-01-18 RX ORDER — IOPAMIDOL 755 MG/ML
100 INJECTION, SOLUTION INTRAVASCULAR ONCE
Status: COMPLETED | OUTPATIENT
Start: 2025-01-18 | End: 2025-01-18

## 2025-01-18 RX ORDER — GABAPENTIN 100 MG/1
100 CAPSULE ORAL
COMMUNITY

## 2025-01-18 RX ADMIN — SODIUM CHLORIDE, PRESERVATIVE FREE 10 ML: 5 INJECTION INTRAVENOUS at 22:49

## 2025-01-18 RX ADMIN — AMIODARONE HYDROCHLORIDE 1 MG/MIN: 1.8 INJECTION, SOLUTION INTRAVENOUS at 23:52

## 2025-01-18 RX ADMIN — IOPAMIDOL 100 ML: 755 INJECTION, SOLUTION INTRAVENOUS at 18:56

## 2025-01-18 RX ADMIN — AMIODARONE HYDROCHLORIDE 150 MG: 1.5 INJECTION, SOLUTION INTRAVENOUS at 22:45

## 2025-01-18 RX ADMIN — FUROSEMIDE 60 MG: 10 INJECTION, SOLUTION INTRAMUSCULAR; INTRAVENOUS at 19:47

## 2025-01-18 RX ADMIN — DILTIAZEM HYDROCHLORIDE 20 MG: 5 INJECTION, SOLUTION INTRAVENOUS at 18:42

## 2025-01-18 RX ADMIN — Medication 3 MG: at 23:55

## 2025-01-18 RX ADMIN — METOPROLOL TARTRATE 5 MG: 5 INJECTION INTRAVENOUS at 17:34

## 2025-01-18 RX ADMIN — SODIUM CHLORIDE 500 ML: 9 INJECTION, SOLUTION INTRAVENOUS at 17:58

## 2025-01-18 RX ADMIN — METOPROLOL TARTRATE 5 MG: 5 INJECTION INTRAVENOUS at 17:43

## 2025-01-18 RX ADMIN — METOPROLOL TARTRATE 5 MG: 5 INJECTION INTRAVENOUS at 17:49

## 2025-01-18 ASSESSMENT — LIFESTYLE VARIABLES
HOW MANY STANDARD DRINKS CONTAINING ALCOHOL DO YOU HAVE ON A TYPICAL DAY: PATIENT DOES NOT DRINK
HOW OFTEN DO YOU HAVE A DRINK CONTAINING ALCOHOL: NEVER

## 2025-01-18 ASSESSMENT — PAIN SCALES - GENERAL
PAINLEVEL_OUTOF10: 0
PAINLEVEL_OUTOF10: 0

## 2025-01-18 ASSESSMENT — PAIN - FUNCTIONAL ASSESSMENT
PAIN_FUNCTIONAL_ASSESSMENT: 0-10
PAIN_FUNCTIONAL_ASSESSMENT: 0-10

## 2025-01-18 NOTE — ED TRIAGE NOTES
Pt ambulatory to triage reporting chest pain and SOB x few days. Reports hx of afib w/ ablation in the past. Pulse feels irregular in triage.

## 2025-01-18 NOTE — ED PROVIDER NOTES
[facility] EMERGENCY DEPT  EMERGENCY DEPARTMENT HISTORY AND PHYSICAL EXAM      Date: 1/18/2025  Patient Name: Jean Paul Moyer  MRN: [id number]  Birthdate 1960  Date of evaluation: 1/18/2025  Provider: Kelley Thakkar MD   Note Started: 5:28 PM EST 1/18/25    HISTORY OF PRESENT ILLNESS     Chief Complaint   Patient presents with    Chest Pain    Shortness of Breath       History Provided By: Patient    HPI: Jean Paul Moyer is a 64 y.o. male with a past medical history of A-fib status post ablation, hypertension presents with a 3-day history of chest pain, described as pressure, and palpitation for the past 3 days associated with shortness of breath that is worse at night.  Denies nausea, vomiting, abdominal pain, dysuria, headache, dizziness.    Patient reports he is supposed to take Eliquis but has not taken this medication in the past month secondary to not being able to get a refill.  Does not take any medication for A-fib at this time.    PAST MEDICAL HISTORY   Past Medical History:  Past Medical History:   Diagnosis Date    A-fib ([facility])     Dr. Leggett is cardiologist    Bronchitis     Hypertension        Past Surgical History:  No past surgical history on file.    Family History:  No family history on file.    Social History:  Social History     Tobacco Use    Smoking status: Every Day     Current packs/day: 1.50     Types: Cigarettes    Smokeless tobacco: Never   Substance Use Topics    Alcohol use: Yes     Comment: ocasionally    Drug use: Not Currently     Types: Cocaine       Allergies:  No Known Allergies    PCP: Odilon Mahan MD    Current Meds:   No current facility-administered medications for this encounter.     Current Outpatient Medications   Medication Sig Dispense Refill    ketorolac (TORADOL) 10 MG tablet Take 1 tablet by mouth every 6 hours as needed for Pain 20 tablet 0    Meloxicam 10 MG CAPS Take 10 mg by mouth daily 30 capsule 0    predniSONE (DELTASONE) 20 MG tablet Take 3 tablets once a

## 2025-01-19 ENCOUNTER — APPOINTMENT (OUTPATIENT)
Facility: HOSPITAL | Age: 65
DRG: 309 | End: 2025-01-19
Payer: MEDICARE

## 2025-01-19 LAB
APPEARANCE UR: CLEAR
APTT PPP: 30.7 SEC (ref 21.2–34.1)
BACTERIA URNS QL MICRO: NEGATIVE /HPF
BILIRUB UR QL: NEGATIVE
COLOR UR: ABNORMAL
EKG ATRIAL RATE: 178 BPM
EKG DIAGNOSIS: NORMAL
EKG Q-T INTERVAL: 268 MS
EKG QRS DURATION: 94 MS
EKG QTC CALCULATION (BAZETT): 452 MS
EKG R AXIS: 76 DEGREES
EKG T AXIS: 4 DEGREES
EKG VENTRICULAR RATE: 171 BPM
EPITH CASTS URNS QL MICRO: ABNORMAL /LPF
ERYTHROCYTE [DISTWIDTH] IN BLOOD BY AUTOMATED COUNT: 14.6 % (ref 11.5–14.5)
GLUCOSE UR STRIP.AUTO-MCNC: NEGATIVE MG/DL
HCT VFR BLD AUTO: 44.7 % (ref 36.6–50.3)
HGB BLD-MCNC: 14.4 G/DL (ref 12.1–17)
HGB UR QL STRIP: NEGATIVE
INR PPP: 1 (ref 0.9–1.1)
KETONES UR QL STRIP.AUTO: NEGATIVE MG/DL
LEUKOCYTE ESTERASE UR QL STRIP.AUTO: NEGATIVE
MCH RBC QN AUTO: 29.1 PG (ref 26–34)
MCHC RBC AUTO-ENTMCNC: 32.2 G/DL (ref 30–36.5)
MCV RBC AUTO: 90.3 FL (ref 80–99)
MUCOUS THREADS URNS QL MICRO: ABNORMAL /LPF
NITRITE UR QL STRIP.AUTO: NEGATIVE
NRBC # BLD: 0 K/UL (ref 0–0.01)
NRBC BLD-RTO: 0 PER 100 WBC
PH UR STRIP: 5 (ref 5–8)
PLATELET # BLD AUTO: 419 K/UL (ref 150–400)
PMV BLD AUTO: 9.7 FL (ref 8.9–12.9)
PROT UR STRIP-MCNC: NEGATIVE MG/DL
PROTHROMBIN TIME: 13.7 SEC (ref 11.9–14.6)
RBC # BLD AUTO: 4.95 M/UL (ref 4.1–5.7)
RBC #/AREA URNS HPF: ABNORMAL /HPF (ref 0–5)
SP GR UR REFRACTOMETRY: 1.01 (ref 1–1.03)
THERAPEUTIC RANGE: NORMAL SEC (ref 82–109)
UFH PPP CHRO-ACNC: <0.1 IU/ML
URINE CULTURE IF INDICATED: ABNORMAL
UROBILINOGEN UR QL STRIP.AUTO: 0.1 EU/DL (ref 0.1–1)
WBC # BLD AUTO: 10.2 K/UL (ref 4.1–11.1)
WBC URNS QL MICRO: ABNORMAL /HPF (ref 0–4)

## 2025-01-19 PROCEDURE — 85610 PROTHROMBIN TIME: CPT

## 2025-01-19 PROCEDURE — 2500000003 HC RX 250 WO HCPCS: Performed by: PHYSICIAN ASSISTANT

## 2025-01-19 PROCEDURE — 2500000003 HC RX 250 WO HCPCS: Performed by: STUDENT IN AN ORGANIZED HEALTH CARE EDUCATION/TRAINING PROGRAM

## 2025-01-19 PROCEDURE — 36415 COLL VENOUS BLD VENIPUNCTURE: CPT

## 2025-01-19 PROCEDURE — 6360000002 HC RX W HCPCS: Performed by: PHYSICIAN ASSISTANT

## 2025-01-19 PROCEDURE — 2500000003 HC RX 250 WO HCPCS: Performed by: INTERNAL MEDICINE

## 2025-01-19 PROCEDURE — 71046 X-RAY EXAM CHEST 2 VIEWS: CPT

## 2025-01-19 PROCEDURE — 85027 COMPLETE CBC AUTOMATED: CPT

## 2025-01-19 PROCEDURE — 6370000000 HC RX 637 (ALT 250 FOR IP): Performed by: INTERNAL MEDICINE

## 2025-01-19 PROCEDURE — 2580000003 HC RX 258: Performed by: STUDENT IN AN ORGANIZED HEALTH CARE EDUCATION/TRAINING PROGRAM

## 2025-01-19 PROCEDURE — 85520 HEPARIN ASSAY: CPT

## 2025-01-19 PROCEDURE — 85730 THROMBOPLASTIN TIME PARTIAL: CPT

## 2025-01-19 PROCEDURE — 2060000000 HC ICU INTERMEDIATE R&B

## 2025-01-19 PROCEDURE — 2580000003 HC RX 258: Performed by: PHYSICIAN ASSISTANT

## 2025-01-19 PROCEDURE — 6370000000 HC RX 637 (ALT 250 FOR IP): Performed by: STUDENT IN AN ORGANIZED HEALTH CARE EDUCATION/TRAINING PROGRAM

## 2025-01-19 RX ORDER — CARVEDILOL 12.5 MG/1
12.5 TABLET ORAL 2 TIMES DAILY WITH MEALS
Status: DISCONTINUED | OUTPATIENT
Start: 2025-01-19 | End: 2025-01-21

## 2025-01-19 RX ORDER — HEPARIN SODIUM 1000 [USP'U]/ML
2000 INJECTION, SOLUTION INTRAVENOUS; SUBCUTANEOUS PRN
Status: DISCONTINUED | OUTPATIENT
Start: 2025-01-19 | End: 2025-01-21

## 2025-01-19 RX ORDER — HEPARIN SODIUM 1000 [USP'U]/ML
2000 INJECTION, SOLUTION INTRAVENOUS; SUBCUTANEOUS PRN
Status: DISCONTINUED | OUTPATIENT
Start: 2025-01-19 | End: 2025-01-19

## 2025-01-19 RX ORDER — HEPARIN SODIUM 1000 [USP'U]/ML
4000 INJECTION, SOLUTION INTRAVENOUS; SUBCUTANEOUS PRN
Status: DISCONTINUED | OUTPATIENT
Start: 2025-01-19 | End: 2025-01-19

## 2025-01-19 RX ORDER — 0.9 % SODIUM CHLORIDE 0.9 %
500 INTRAVENOUS SOLUTION INTRAVENOUS ONCE
Status: COMPLETED | OUTPATIENT
Start: 2025-01-19 | End: 2025-01-19

## 2025-01-19 RX ORDER — DILTIAZEM HYDROCHLORIDE 5 MG/ML
10 INJECTION INTRAVENOUS ONCE
Status: COMPLETED | OUTPATIENT
Start: 2025-01-19 | End: 2025-01-19

## 2025-01-19 RX ORDER — HEPARIN SODIUM 10000 [USP'U]/100ML
5-30 INJECTION, SOLUTION INTRAVENOUS CONTINUOUS
Status: DISCONTINUED | OUTPATIENT
Start: 2025-01-19 | End: 2025-01-21

## 2025-01-19 RX ORDER — MIDODRINE HYDROCHLORIDE 5 MG/1
10 TABLET ORAL 3 TIMES DAILY PRN
Status: DISCONTINUED | OUTPATIENT
Start: 2025-01-19 | End: 2025-01-24 | Stop reason: HOSPADM

## 2025-01-19 RX ORDER — HEPARIN SODIUM 1000 [USP'U]/ML
4000 INJECTION, SOLUTION INTRAVENOUS; SUBCUTANEOUS ONCE
Status: COMPLETED | OUTPATIENT
Start: 2025-01-19 | End: 2025-01-19

## 2025-01-19 RX ORDER — DIGOXIN 0.25 MG/ML
62 INJECTION INTRAMUSCULAR; INTRAVENOUS ONCE
Status: COMPLETED | OUTPATIENT
Start: 2025-01-19 | End: 2025-01-19

## 2025-01-19 RX ORDER — HEPARIN SODIUM 1000 [USP'U]/ML
4000 INJECTION, SOLUTION INTRAVENOUS; SUBCUTANEOUS PRN
Status: DISCONTINUED | OUTPATIENT
Start: 2025-01-19 | End: 2025-01-21

## 2025-01-19 RX ADMIN — APIXABAN 5 MG: 5 TABLET, FILM COATED ORAL at 00:40

## 2025-01-19 RX ADMIN — HEPARIN SODIUM 11 UNITS/KG/HR: 10000 INJECTION, SOLUTION INTRAVENOUS at 12:13

## 2025-01-19 RX ADMIN — HEPARIN SODIUM 4000 UNITS: 1000 INJECTION INTRAVENOUS; SUBCUTANEOUS at 18:44

## 2025-01-19 RX ADMIN — AMIODARONE HYDROCHLORIDE 200 MG: 200 TABLET ORAL at 12:02

## 2025-01-19 RX ADMIN — DILTIAZEM HYDROCHLORIDE 5 MG/HR: 5 INJECTION, SOLUTION INTRAVENOUS at 22:07

## 2025-01-19 RX ADMIN — HEPARIN SODIUM 4000 UNITS: 1000 INJECTION INTRAVENOUS; SUBCUTANEOUS at 12:05

## 2025-01-19 RX ADMIN — DILTIAZEM HYDROCHLORIDE 10 MG: 5 INJECTION, SOLUTION INTRAVENOUS at 12:04

## 2025-01-19 RX ADMIN — HEPARIN SODIUM 4000 UNITS: 1000 INJECTION INTRAVENOUS; SUBCUTANEOUS at 12:15

## 2025-01-19 RX ADMIN — DILTIAZEM HYDROCHLORIDE 5 MG/HR: 5 INJECTION, SOLUTION INTRAVENOUS at 03:38

## 2025-01-19 RX ADMIN — CARVEDILOL 12.5 MG: 12.5 TABLET, FILM COATED ORAL at 17:14

## 2025-01-19 RX ADMIN — SODIUM CHLORIDE, PRESERVATIVE FREE 10 ML: 5 INJECTION INTRAVENOUS at 22:03

## 2025-01-19 RX ADMIN — AMIODARONE HYDROCHLORIDE 200 MG: 200 TABLET ORAL at 21:55

## 2025-01-19 RX ADMIN — ASPIRIN 325 MG: 325 TABLET ORAL at 09:23

## 2025-01-19 RX ADMIN — Medication 3 MG: at 21:55

## 2025-01-19 RX ADMIN — SODIUM CHLORIDE 500 ML: 9 INJECTION, SOLUTION INTRAVENOUS at 20:20

## 2025-01-19 RX ADMIN — DIGOXIN 62 MCG: 0.25 INJECTION INTRAMUSCULAR; INTRAVENOUS at 12:02

## 2025-01-19 RX ADMIN — ATORVASTATIN CALCIUM 40 MG: 40 TABLET, FILM COATED ORAL at 21:55

## 2025-01-19 RX ADMIN — ATORVASTATIN CALCIUM 40 MG: 40 TABLET, FILM COATED ORAL at 00:40

## 2025-01-19 ASSESSMENT — PAIN SCALES - GENERAL
PAINLEVEL_OUTOF10: 0

## 2025-01-19 NOTE — ED NOTES
..ED TO INPATIENT SBAR HANDOFF    Patient Name: Monica Moyer   Preferred Name: Monica  : 1960  64 y.o.   Family/Caregiver Present: no   Code Status Order: No Order  PO Status: NPO:  Telemetry Order:   C-SSRS: Risk of Suicide: No Risk  Sitter no   Restraints:   Sepsis Risk Score     Situation  Chief Complaint   Patient presents with    Chest Pain    Shortness of Breath     Brief Description of Patient's Condition: Chest Pain, AFIB RVR, CHF  Mental Status: oriented, alert, coherent, logical, thought processes intact, and able to concentrate and follow conversation  Arrived from:Home  Imaging:   CTA CHEST W WO CONTRAST PE Eval   Final Result      1. No pulmonary embolism.   2. Mild interstitial pulmonary edema. Moderate right and small left pleural   effusions.   3. Mild centrilobular emphysema.   4. Nonspecific mediastinal lymphadenopathy.   Recommend followup PA and lateral chest views in 8-10 weeks to ensure   resolution.            Electronically signed by Javon Whitehead      XR CHEST PORTABLE   Final Result   No acute cardiopulmonary findings.            Electronically signed by MONICA PEREZ        Abnormal labs:   Abnormal Labs Reviewed   BASIC METABOLIC PANEL - Abnormal; Notable for the following components:       Result Value    Glucose 136 (*)     All other components within normal limits   CBC WITH AUTO DIFFERENTIAL - Abnormal; Notable for the following components:    Platelets 428 (*)     All other components within normal limits   TSH - Abnormal; Notable for the following components:    TSH, 3rd Generation 6.77 (*)     All other components within normal limits   BRAIN NATRIURETIC PEPTIDE - Abnormal; Notable for the following components:    NT Pro-BNP 2,673 (*)     All other components within normal limits       Background  Allergies: No Known Allergies  History:   Past Medical History:   Diagnosis Date    A-fib ([facility])     Dr. Leggett is cardiologist    Bronchitis     Hypertension

## 2025-01-19 NOTE — PLAN OF CARE
Problem: Chronic Conditions and Co-morbidities  Goal: Patient's chronic conditions and co-morbidity symptoms are monitored and maintained or improved  1/19/2025 0816 by Arline Huber RN  Outcome: Progressing  1/18/2025 2336 by Trey Nelson RN  Outcome: Progressing     Problem: Discharge Planning  Goal: Discharge to home or other facility with appropriate resources  1/19/2025 0816 by Arline Huber RN  Outcome: Progressing  1/18/2025 2336 by Trey Nelson RN  Outcome: Progressing     Problem: Pain  Goal: Verbalizes/displays adequate comfort level or baseline comfort level  1/19/2025 0816 by Arline Huber RN  Outcome: Progressing  1/18/2025 2336 by Trey Nelson RN  Outcome: Progressing     Problem: ABCDS Injury Assessment  Goal: Absence of physical injury  1/19/2025 0816 by Arline Huber RN  Outcome: Progressing  1/18/2025 2336 by Trey Nelson RN  Outcome: Progressing

## 2025-01-19 NOTE — SIGNIFICANT EVENT
Patient is a 64-year-old male with a past medical history of A-fib s/p ablation and hypertension who was admitted on 1/18/2025 for worsening shortness of breath.  Rapid response called tonight as patient was variance in tachycardia with rate sustaining in the 170s.  Stat EKG completed bedside revealing A-fib with RVR.  Evaluated patient at bedside resting comfortably and he reports experiencing palpitations without any associated symptoms.  Patient on oral amiodarone and BP low to normal so provided patient with amiodarone bolus which provided slight improvement to rate, but patient returned to the rate sustaining in the 150s to 170s so will place patient on amiodarone drip at this time.  Also, noticed no cardiology consult in chart, but patient followed by Dr. Leggett in outpatient setting so will place consult to one of his colleagues to evaluate in the morning.    To note, patient remained fluctuating between 120 and 150 despite being on amiodarone drip so patient was transition to IV diltiazem drip as patient had improvement to blood pressure.

## 2025-01-19 NOTE — CARE COORDINATION
01/19/25 1345   Service Assessment   Patient Orientation Alert and Oriented   Cognition Alert   History Provided By Patient   Primary Caregiver Self   Support Systems Children   PCP Verified by CM Yes  ([facility])   Last Visit to PCP Within last 3 months   Prior Functional Level Independent in ADLs/IADLs   Current Functional Level Independent in ADLs/IADLs   Can patient return to prior living arrangement Yes   Ability to make needs known: Good   Family able to assist with home care needs: No   Would you like for me to discuss the discharge plan with any other family members/significant others, and if so, who? No   Financial Resources Other (Comment)  ([employer])   Community Resources None   Social/Functional History   Lives With Alone     Met with the pt at bedside.  Claimed ind with all adls and iadls.  Denied DME  Stated he is able to arrange transport at [facility]  CM to follow for needs.  Advance Care Planning     General Advance Care Planning (ACP) Conversation    Date of Conversation: 1/19/2025  Conducted with: Patient with Decision Making Capacity  Other persons present: None    Healthcare Decision Maker:   Primary Decision Maker: Columba Moyer - Child - [phone]       Content/Action Overview:  Has ACP document(s) on file - reflects the patient's care preferences  Reviewed DNR/DNI and patient elects Full Code (Attempt Resuscitation)        Length of Voluntary ACP Conversation in minutes:  <16 minutes (Non-Billable)    Katalina Paul RN

## 2025-01-19 NOTE — H&P
V2.0  History and Physical      Name:  Jean Paul Moyer /Age/Sex: 1960  (64 y.o. male)   MRN & CSN:  [id number] & [id number] Encounter Date/Time: 2025 / 21:05   Location:  [facility] PCP: Odilon Mahan MD       Hospital Day: 1    Assessment and Plan:   Jean Paul Moyer is a 64 y.o. male with a pmh of atrial fibrillation status post ablation who presents with Atrial fibrillation with RVR (HCC) and chest pain.    Hospital Problems             Last Modified POA    * (Principal) Atrial fibrillation with RVR (HCC) 2025 Yes    Tobacco abuse 2025 Yes    Acute chest pain 2025 Yes       Plan:  Atrial fibrillation with RVR (HCC)  This patient has been having symptoms of chest pain and palpitations for 3 days prior to admission.  He has a prior history of atrial fibrillation status post ablation.  He has not been taking his medications consistently for the past month due to insurance issues.  On arrival to the emergency room, he was in A-fib with RVR with a heart rate above 120.  He was given some IV beta-blocker which slowed him down.    Plan  1.  Resume home medications including amiodarone and apixaban  2.  Admits to telemetry for further management  3.  Cardiology consultation in the morning     Acute chest pain  This patient has been having symptoms of chest pain and palpitations for 3 days prior to admission.  He denies any prior MIs but he does have a history of atrial fibrillation status post ablation.  He feels the chest pain he is having is related to the rapid heart rate as his pain subsided when the rate was controlled.  High-sensitivity troponins have remained low (12 and 14).  HEART score 5.    Plan  1.  Continue following troponins if symptoms persist  2.  Admit to telemetry  3.  Cardiology consultation.  Likely will require stress testing once his rate is controlled.  4.  Echocardiogram in the morning     Tobacco abuse  This is a chronic condition.  We discussed smoking cessation as

## 2025-01-19 NOTE — CONSULTS
appropriate    []         Post-cath site without hematoma, bruit, tenderness, or thrill.  Distal pulses intact.    Data:      Telemetry: A-fib with RVR      Prior to Admission medications    Medication Sig Start Date End Date Taking? Authorizing Provider   gabapentin (NEURONTIN) 100 MG capsule Take 1 capsule by mouth nightly. Max Daily Amount: 100 mg   Yes Provider, MD Dick   apixaban (ELIQUIS) 5 MG TABS tablet Take 1 tablet by mouth 2 times daily 11/4/20  Yes Automatic Reconciliation, Ar   ketorolac (TORADOL) 10 MG tablet Take 1 tablet by mouth every 6 hours as needed for Pain  Patient not taking: Reported on 1/18/2025 2/25/24   Chastity Connelly PA-C   Meloxicam 10 MG CAPS Take 10 mg by mouth daily  Patient not taking: Reported on 1/18/2025 2/24/24   Bryce Nguyễn PA-C   amiodarone (CORDARONE) 200 MG tablet Take 1 tablet by mouth in the morning and 1 tablet in the evening.  Patient not taking: Reported on 1/18/2025 11/5/20   Automatic Reconciliation, Ar   amLODIPine (NORVASC) 5 MG tablet Take 1 tablet by mouth daily  Patient not taking: Reported on 1/18/2025 9/5/21   Automatic Reconciliation, Ar   aspirin 325 MG tablet Take 1 tablet by mouth daily  Patient not taking: Reported on 1/18/2025 9/5/21   Automatic Reconciliation, Ar   atorvastatin (LIPITOR) 40 MG tablet Take 1 tablet by mouth nightly  Patient not taking: Reported on 1/18/2025 9/4/21   Automatic Reconciliation, Ar       Recent Results (from the past 24 hour(s))   Basic Metabolic Panel    Collection Time: 01/18/25  5:30 PM   Result Value Ref Range    Sodium 137 136 - 145 mmol/L    Potassium 4.3 3.5 - 5.1 mmol/L    Chloride 103 97 - 108 mmol/L    CO2 29 21 - 32 mmol/L    Anion Gap 5 2 - 12 mmol/L    Glucose 136 (H) 65 - 100 mg/dL    BUN 16 6 - 20 mg/dL    Creatinine 1.07 0.70 - 1.30 mg/dL    BUN/Creatinine Ratio 15 12 - 20      Est, Glom Filt Rate 77 >60 ml/min/1.73m2    Calcium 9.3 8.5 - 10.1 mg/dL   CBC with Auto Differential    Collection

## 2025-01-19 NOTE — ASSESSMENT & PLAN NOTE
This patient has been having symptoms of chest pain and palpitations for 3 days prior to admission.  He has a prior history of atrial fibrillation status post ablation.  He has not been taking his medications consistently for the past month due to insurance issues.  On arrival to the emergency room, he was in A-fib with RVR with a heart rate above 120.  He was given some IV beta-blocker which slowed him down.    Plan  1.  Resume home medications including amiodarone and apixaban  2.  Admits to telemetry for further management  3.  Cardiology consultation in the morning

## 2025-01-19 NOTE — ASSESSMENT & PLAN NOTE
This patient has been having symptoms of chest pain and palpitations for 3 days prior to admission.  He denies any prior MIs but he does have a history of atrial fibrillation status post ablation.  He feels the chest pain he is having is related to the rapid heart rate as his pain subsided when the rate was controlled.  High-sensitivity troponins have remained low (12 and 14).  HEART score 5.    Plan  1.  Continue following troponins if symptoms persist  2.  Admit to telemetry  3.  Cardiology consultation.  Likely will require stress testing once his rate is controlled.  4.  Echocardiogram in the morning

## 2025-01-19 NOTE — ASSESSMENT & PLAN NOTE
This is a chronic condition.  We discussed smoking cessation as the patient for was open to do it at this time and willing to commit.    Plan  1.  Nicotine patch once the chest pain issue has been resolved    Spent 5 minutes dedicated to smoking cessation counseling

## 2025-01-20 ENCOUNTER — APPOINTMENT (OUTPATIENT)
Facility: HOSPITAL | Age: 65
DRG: 309 | End: 2025-01-20
Attending: INTERNAL MEDICINE
Payer: MEDICARE

## 2025-01-20 LAB
ECHO AO ASC DIAM: 3.1 CM
ECHO AO ASCENDING AORTA INDEX: 1.56 CM/M2
ECHO AO ROOT DIAM: 3.2 CM
ECHO AO ROOT INDEX: 1.61 CM/M2
ECHO AV AREA PEAK VELOCITY: 1.3 CM2
ECHO AV AREA VTI: 1.3 CM2
ECHO AV AREA/BSA PEAK VELOCITY: 0.7 CM2/M2
ECHO AV AREA/BSA VTI: 0.7 CM2/M2
ECHO AV MEAN GRADIENT: 3 MMHG
ECHO AV MEAN VELOCITY: 0.8 M/S
ECHO AV PEAK GRADIENT: 5 MMHG
ECHO AV PEAK VELOCITY: 1.2 M/S
ECHO AV VELOCITY RATIO: 0.5
ECHO AV VTI: 19.7 CM
ECHO BSA: 2.07 M2
ECHO EST RA PRESSURE: 3 MMHG
ECHO LA AREA 2C: 21.5 CM2
ECHO LA AREA 4C: 18.3 CM2
ECHO LA DIAMETER INDEX: 2.01 CM/M2
ECHO LA DIAMETER: 4 CM
ECHO LA MAJOR AXIS: 5.9 CM
ECHO LA MINOR AXIS: 6.4 CM
ECHO LA TO AORTIC ROOT RATIO: 1.25
ECHO LA VOL BP: 55 ML (ref 18–58)
ECHO LA VOL MOD A2C: 65 ML (ref 18–58)
ECHO LA VOL MOD A4C: 45 ML (ref 18–58)
ECHO LA VOL/BSA BIPLANE: 28 ML/M2 (ref 16–34)
ECHO LA VOLUME INDEX MOD A2C: 33 ML/M2 (ref 16–34)
ECHO LA VOLUME INDEX MOD A4C: 23 ML/M2 (ref 16–34)
ECHO LV E' LATERAL VELOCITY: 6.1 CM/S
ECHO LV E' SEPTAL VELOCITY: 4.24 CM/S
ECHO LV EDV A2C: 136 ML
ECHO LV EDV A4C: 146 ML
ECHO LV EDV INDEX A4C: 73 ML/M2
ECHO LV EDV NDEX A2C: 68 ML/M2
ECHO LV EF PHYSICIAN: 55 %
ECHO LV EJECTION FRACTION A2C: 49 %
ECHO LV EJECTION FRACTION A4C: 48 %
ECHO LV ESV A2C: 70 ML
ECHO LV ESV A4C: 77 ML
ECHO LV ESV INDEX A2C: 35 ML/M2
ECHO LV ESV INDEX A4C: 39 ML/M2
ECHO LV FRACTIONAL SHORTENING: 28 % (ref 28–44)
ECHO LV INTERNAL DIMENSION DIASTOLE INDEX: 2.51 CM/M2
ECHO LV INTERNAL DIMENSION DIASTOLIC: 5 CM (ref 4.2–5.9)
ECHO LV INTERNAL DIMENSION SYSTOLIC INDEX: 1.81 CM/M2
ECHO LV INTERNAL DIMENSION SYSTOLIC: 3.6 CM
ECHO LV IVSD: 1.1 CM (ref 0.6–1)
ECHO LV MASS 2D: 220.3 G (ref 88–224)
ECHO LV MASS INDEX 2D: 110.7 G/M2 (ref 49–115)
ECHO LV POSTERIOR WALL DIASTOLIC: 1.2 CM (ref 0.6–1)
ECHO LV RELATIVE WALL THICKNESS RATIO: 0.48
ECHO LVOT AREA: 2.5 CM2
ECHO LVOT AV VTI INDEX: 0.52
ECHO LVOT DIAM: 1.8 CM
ECHO LVOT MEAN GRADIENT: 1 MMHG
ECHO LVOT PEAK GRADIENT: 1 MMHG
ECHO LVOT PEAK VELOCITY: 0.6 M/S
ECHO LVOT STROKE VOLUME INDEX: 13 ML/M2
ECHO LVOT SV: 25.9 ML
ECHO LVOT VTI: 10.2 CM
ECHO MV AREA VTI: 0.9 CM2
ECHO MV LVOT VTI INDEX: 2.73
ECHO MV MAX VELOCITY: 1 M/S
ECHO MV MEAN GRADIENT: 2 MMHG
ECHO MV MEAN VELOCITY: 0.6 M/S
ECHO MV PEAK GRADIENT: 4 MMHG
ECHO MV REGURGITANT PEAK GRADIENT: 31 MMHG
ECHO MV REGURGITANT PEAK VELOCITY: 2.8 M/S
ECHO MV VTI: 27.8 CM
ECHO PV MAX VELOCITY: 0.8 M/S
ECHO PV MEAN GRADIENT: 1 MMHG
ECHO PV MEAN VELOCITY: 0.5 M/S
ECHO PV PEAK GRADIENT: 3 MMHG
ECHO PV VTI: 14.6 CM
ECHO RA AREA 4C: 28.4 CM2
ECHO RA END SYSTOLIC VOLUME APICAL 4 CHAMBER INDEX BSA: 53 ML/M2
ECHO RA VOLUME: 105 ML
ECHO RIGHT VENTRICULAR SYSTOLIC PRESSURE (RVSP): 16 MMHG
ECHO RV BASAL DIMENSION: 4.2 CM
ECHO RV FREE WALL PEAK S': 9.9 CM/S
ECHO RV TAPSE: 1.4 CM (ref 1.7–?)
ECHO TV REGURGITANT MAX VELOCITY: 1.82 M/S
ECHO TV REGURGITANT PEAK GRADIENT: 13 MMHG
ECHO TV REGURGITANT VTI: 94.6 CM
EKG ATRIAL RATE: 115 BPM
EKG DIAGNOSIS: NORMAL
EKG Q-T INTERVAL: 302 MS
EKG QRS DURATION: 92 MS
EKG QTC CALCULATION (BAZETT): 494 MS
EKG R AXIS: 88 DEGREES
EKG T AXIS: 27 DEGREES
EKG VENTRICULAR RATE: 161 BPM
UFH PPP CHRO-ACNC: 0.26 IU/ML
UFH PPP CHRO-ACNC: 0.27 IU/ML
UFH PPP CHRO-ACNC: 0.36 IU/ML
UFH PPP CHRO-ACNC: 0.39 IU/ML

## 2025-01-20 PROCEDURE — 2060000000 HC ICU INTERMEDIATE R&B

## 2025-01-20 PROCEDURE — 6360000004 HC RX CONTRAST MEDICATION

## 2025-01-20 PROCEDURE — 2500000003 HC RX 250 WO HCPCS: Performed by: INTERNAL MEDICINE

## 2025-01-20 PROCEDURE — 6370000000 HC RX 637 (ALT 250 FOR IP): Performed by: INTERNAL MEDICINE

## 2025-01-20 PROCEDURE — 6360000002 HC RX W HCPCS: Performed by: PHYSICIAN ASSISTANT

## 2025-01-20 PROCEDURE — 2580000003 HC RX 258: Performed by: INTERNAL MEDICINE

## 2025-01-20 PROCEDURE — 6370000000 HC RX 637 (ALT 250 FOR IP): Performed by: STUDENT IN AN ORGANIZED HEALTH CARE EDUCATION/TRAINING PROGRAM

## 2025-01-20 PROCEDURE — C8929 TTE W OR WO FOL WCON,DOPPLER: HCPCS

## 2025-01-20 PROCEDURE — 85520 HEPARIN ASSAY: CPT

## 2025-01-20 RX ORDER — DILTIAZEM HYDROCHLORIDE 5 MG/ML
10 INJECTION INTRAVENOUS ONCE
Status: COMPLETED | OUTPATIENT
Start: 2025-01-20 | End: 2025-01-20

## 2025-01-20 RX ORDER — AMIODARONE HYDROCHLORIDE 200 MG/1
400 TABLET ORAL 3 TIMES DAILY
Status: DISCONTINUED | OUTPATIENT
Start: 2025-01-20 | End: 2025-01-21

## 2025-01-20 RX ADMIN — HEPARIN SODIUM 17 UNITS/KG/HR: 10000 INJECTION, SOLUTION INTRAVENOUS at 08:52

## 2025-01-20 RX ADMIN — DILTIAZEM HYDROCHLORIDE 10 MG: 5 INJECTION, SOLUTION INTRAVENOUS at 11:00

## 2025-01-20 RX ADMIN — ASPIRIN 325 MG: 325 TABLET ORAL at 08:52

## 2025-01-20 RX ADMIN — DILTIAZEM HYDROCHLORIDE 10 MG/HR: 5 INJECTION, SOLUTION INTRAVENOUS at 20:29

## 2025-01-20 RX ADMIN — HEPARIN SODIUM 2000 UNITS: 1000 INJECTION INTRAVENOUS; SUBCUTANEOUS at 13:48

## 2025-01-20 RX ADMIN — HEPARIN SODIUM 2000 UNITS: 1000 INJECTION INTRAVENOUS; SUBCUTANEOUS at 04:13

## 2025-01-20 RX ADMIN — AMLODIPINE BESYLATE 5 MG: 5 TABLET ORAL at 08:52

## 2025-01-20 RX ADMIN — AMIODARONE HYDROCHLORIDE 200 MG: 200 TABLET ORAL at 08:52

## 2025-01-20 RX ADMIN — DILTIAZEM HYDROCHLORIDE 10 MG/HR: 5 INJECTION, SOLUTION INTRAVENOUS at 19:05

## 2025-01-20 RX ADMIN — CARVEDILOL 12.5 MG: 12.5 TABLET, FILM COATED ORAL at 16:18

## 2025-01-20 RX ADMIN — CARVEDILOL 12.5 MG: 12.5 TABLET, FILM COATED ORAL at 08:52

## 2025-01-20 RX ADMIN — AMIODARONE HYDROCHLORIDE 400 MG: 200 TABLET ORAL at 16:18

## 2025-01-20 RX ADMIN — ATORVASTATIN CALCIUM 40 MG: 40 TABLET, FILM COATED ORAL at 20:36

## 2025-01-20 RX ADMIN — AMIODARONE HYDROCHLORIDE 400 MG: 200 TABLET ORAL at 20:36

## 2025-01-20 RX ADMIN — DILTIAZEM HYDROCHLORIDE 10 MG/HR: 5 INJECTION, SOLUTION INTRAVENOUS at 19:39

## 2025-01-20 RX ADMIN — Medication 3 MG: at 20:36

## 2025-01-20 RX ADMIN — PERFLUTREN 2 ML: 6.52 INJECTION, SUSPENSION INTRAVENOUS at 15:54

## 2025-01-20 RX ADMIN — SODIUM CHLORIDE, PRESERVATIVE FREE 10 ML: 5 INJECTION INTRAVENOUS at 20:14

## 2025-01-20 ASSESSMENT — ENCOUNTER SYMPTOMS
BACK PAIN: 0
VOMITING: 0
ABDOMINAL PAIN: 0
NAUSEA: 0
COUGH: 0
WHEEZING: 0
SHORTNESS OF BREATH: 0

## 2025-01-20 ASSESSMENT — PAIN SCALES - GENERAL
PAINLEVEL_OUTOF10: 0
PAINLEVEL_OUTOF10: 0

## 2025-01-20 NOTE — CARE COORDINATION
CM reviewed chart. Followed by cardiology    Diltiazem gtt    Heparin gtt    DCP: home    CM will continue to follow

## 2025-01-21 LAB
ANION GAP SERPL CALC-SCNC: 3 MMOL/L (ref 2–12)
BUN SERPL-MCNC: 22 MG/DL (ref 6–20)
BUN/CREAT SERPL: 18 (ref 12–20)
CA-I BLD-MCNC: 8.8 MG/DL (ref 8.5–10.1)
CHLORIDE SERPL-SCNC: 109 MMOL/L (ref 97–108)
CO2 SERPL-SCNC: 26 MMOL/L (ref 21–32)
CREAT SERPL-MCNC: 1.19 MG/DL (ref 0.7–1.3)
ERYTHROCYTE [DISTWIDTH] IN BLOOD BY AUTOMATED COUNT: 14.4 % (ref 11.5–14.5)
GLUCOSE SERPL-MCNC: 112 MG/DL (ref 65–100)
HCT VFR BLD AUTO: 39.5 % (ref 36.6–50.3)
HGB BLD-MCNC: 12.7 G/DL (ref 12.1–17)
MCH RBC QN AUTO: 29.1 PG (ref 26–34)
MCHC RBC AUTO-ENTMCNC: 32.2 G/DL (ref 30–36.5)
MCV RBC AUTO: 90.6 FL (ref 80–99)
NRBC # BLD: 0 K/UL (ref 0–0.01)
NRBC BLD-RTO: 0 PER 100 WBC
PLATELET # BLD AUTO: 355 K/UL (ref 150–400)
PMV BLD AUTO: 9.8 FL (ref 8.9–12.9)
POTASSIUM SERPL-SCNC: 3.9 MMOL/L (ref 3.5–5.1)
RBC # BLD AUTO: 4.36 M/UL (ref 4.1–5.7)
SODIUM SERPL-SCNC: 138 MMOL/L (ref 136–145)
UFH PPP CHRO-ACNC: 0.32 IU/ML
WBC # BLD AUTO: 10.2 K/UL (ref 4.1–11.1)

## 2025-01-21 PROCEDURE — 6370000000 HC RX 637 (ALT 250 FOR IP): Performed by: INTERNAL MEDICINE

## 2025-01-21 PROCEDURE — 2060000000 HC ICU INTERMEDIATE R&B

## 2025-01-21 PROCEDURE — 36415 COLL VENOUS BLD VENIPUNCTURE: CPT

## 2025-01-21 PROCEDURE — 2500000003 HC RX 250 WO HCPCS: Performed by: INTERNAL MEDICINE

## 2025-01-21 PROCEDURE — 85027 COMPLETE CBC AUTOMATED: CPT

## 2025-01-21 PROCEDURE — 80048 BASIC METABOLIC PNL TOTAL CA: CPT

## 2025-01-21 PROCEDURE — 85520 HEPARIN ASSAY: CPT

## 2025-01-21 PROCEDURE — 6360000002 HC RX W HCPCS: Performed by: PHYSICIAN ASSISTANT

## 2025-01-21 PROCEDURE — 2580000003 HC RX 258: Performed by: INTERNAL MEDICINE

## 2025-01-21 RX ORDER — CARVEDILOL 12.5 MG/1
25 TABLET ORAL 2 TIMES DAILY WITH MEALS
Status: DISCONTINUED | OUTPATIENT
Start: 2025-01-21 | End: 2025-01-24 | Stop reason: HOSPADM

## 2025-01-21 RX ORDER — AMIODARONE HYDROCHLORIDE 200 MG/1
400 TABLET ORAL DAILY
Status: DISCONTINUED | OUTPATIENT
Start: 2025-01-22 | End: 2025-01-24 | Stop reason: HOSPADM

## 2025-01-21 RX ADMIN — AMIODARONE HYDROCHLORIDE 400 MG: 200 TABLET ORAL at 08:36

## 2025-01-21 RX ADMIN — APIXABAN 5 MG: 5 TABLET, FILM COATED ORAL at 12:59

## 2025-01-21 RX ADMIN — CARVEDILOL 25 MG: 12.5 TABLET, FILM COATED ORAL at 17:36

## 2025-01-21 RX ADMIN — APIXABAN 5 MG: 5 TABLET, FILM COATED ORAL at 21:14

## 2025-01-21 RX ADMIN — CARVEDILOL 12.5 MG: 12.5 TABLET, FILM COATED ORAL at 08:36

## 2025-01-21 RX ADMIN — SODIUM CHLORIDE, PRESERVATIVE FREE 10 ML: 5 INJECTION INTRAVENOUS at 21:12

## 2025-01-21 RX ADMIN — DILTIAZEM HYDROCHLORIDE 10 MG/HR: 5 INJECTION, SOLUTION INTRAVENOUS at 07:17

## 2025-01-21 RX ADMIN — ASPIRIN 325 MG: 325 TABLET ORAL at 08:36

## 2025-01-21 RX ADMIN — HEPARIN SODIUM 19 UNITS/KG/HR: 10000 INJECTION, SOLUTION INTRAVENOUS at 01:01

## 2025-01-21 RX ADMIN — ATORVASTATIN CALCIUM 40 MG: 40 TABLET, FILM COATED ORAL at 21:14

## 2025-01-21 RX ADMIN — Medication 3 MG: at 21:14

## 2025-01-21 ASSESSMENT — ENCOUNTER SYMPTOMS
SHORTNESS OF BREATH: 0
COUGH: 0
NAUSEA: 0
ABDOMINAL PAIN: 0
VOMITING: 0
BACK PAIN: 0
WHEEZING: 0

## 2025-01-21 ASSESSMENT — PAIN SCALES - GENERAL
PAINLEVEL_OUTOF10: 0

## 2025-01-21 NOTE — PLAN OF CARE
Problem: Chronic Conditions and Co-morbidities  Goal: Patient's chronic conditions and co-morbidity symptoms are monitored and maintained or improved  1/20/2025 2015 by Kelly Boothe RN  Outcome: Progressing  1/20/2025 0953 by Ana Irby RN  Outcome: Progressing     Problem: Discharge Planning  Goal: Discharge to home or other facility with appropriate resources  1/20/2025 2015 by Kelly Boothe RN  Outcome: Progressing  1/20/2025 0953 by Ana Irby RN  Outcome: Progressing     Problem: Pain  Goal: Verbalizes/displays adequate comfort level or baseline comfort level  1/20/2025 2015 by Kelly Boothe RN  Outcome: Progressing  1/20/2025 0953 by Ana Irby RN  Outcome: Progressing     Problem: ABCDS Injury Assessment  Goal: Absence of physical injury  1/20/2025 2015 by Kelly Boothe RN  Outcome: Progressing  1/20/2025 0953 by Ana Irby RN  Outcome: Progressing

## 2025-01-22 LAB
ANION GAP SERPL CALC-SCNC: 4 MMOL/L (ref 2–12)
BUN SERPL-MCNC: 16 MG/DL (ref 6–20)
BUN/CREAT SERPL: 15 (ref 12–20)
CA-I BLD-MCNC: 8.8 MG/DL (ref 8.5–10.1)
CHLORIDE SERPL-SCNC: 108 MMOL/L (ref 97–108)
CO2 SERPL-SCNC: 26 MMOL/L (ref 21–32)
CREAT SERPL-MCNC: 1.08 MG/DL (ref 0.7–1.3)
EKG ATRIAL RATE: 174 BPM
EKG DIAGNOSIS: NORMAL
EKG Q-T INTERVAL: 322 MS
EKG QRS DURATION: 100 MS
EKG QTC CALCULATION (BAZETT): 510 MS
EKG R AXIS: 87 DEGREES
EKG T AXIS: 15 DEGREES
EKG VENTRICULAR RATE: 151 BPM
GLUCOSE BLD STRIP.AUTO-MCNC: 155 MG/DL (ref 65–100)
GLUCOSE SERPL-MCNC: 90 MG/DL (ref 65–100)
PERFORMED BY:: ABNORMAL
POTASSIUM SERPL-SCNC: 4.4 MMOL/L (ref 3.5–5.1)
SODIUM SERPL-SCNC: 138 MMOL/L (ref 136–145)

## 2025-01-22 PROCEDURE — 82962 GLUCOSE BLOOD TEST: CPT

## 2025-01-22 PROCEDURE — 93005 ELECTROCARDIOGRAM TRACING: CPT | Performed by: PHYSICIAN ASSISTANT

## 2025-01-22 PROCEDURE — 36415 COLL VENOUS BLD VENIPUNCTURE: CPT

## 2025-01-22 PROCEDURE — 2500000003 HC RX 250 WO HCPCS: Performed by: PHYSICIAN ASSISTANT

## 2025-01-22 PROCEDURE — 80048 BASIC METABOLIC PNL TOTAL CA: CPT

## 2025-01-22 PROCEDURE — 6370000000 HC RX 637 (ALT 250 FOR IP): Performed by: INTERNAL MEDICINE

## 2025-01-22 PROCEDURE — 2500000003 HC RX 250 WO HCPCS: Performed by: INTERNAL MEDICINE

## 2025-01-22 PROCEDURE — 2060000000 HC ICU INTERMEDIATE R&B

## 2025-01-22 RX ORDER — METOPROLOL TARTRATE 1 MG/ML
5 INJECTION, SOLUTION INTRAVENOUS EVERY 6 HOURS PRN
Status: DISCONTINUED | OUTPATIENT
Start: 2025-01-22 | End: 2025-01-24 | Stop reason: HOSPADM

## 2025-01-22 RX ORDER — GUAIFENESIN 200 MG/10ML
200 LIQUID ORAL EVERY 4 HOURS PRN
Status: DISCONTINUED | OUTPATIENT
Start: 2025-01-22 | End: 2025-01-24 | Stop reason: HOSPADM

## 2025-01-22 RX ADMIN — SODIUM CHLORIDE, PRESERVATIVE FREE 5 ML: 5 INJECTION INTRAVENOUS at 22:56

## 2025-01-22 RX ADMIN — GUAIFENESIN 200 MG: 200 SOLUTION ORAL at 22:51

## 2025-01-22 RX ADMIN — APIXABAN 5 MG: 5 TABLET, FILM COATED ORAL at 22:51

## 2025-01-22 RX ADMIN — SODIUM CHLORIDE, PRESERVATIVE FREE 10 ML: 5 INJECTION INTRAVENOUS at 08:34

## 2025-01-22 RX ADMIN — ASPIRIN 325 MG: 325 TABLET ORAL at 08:34

## 2025-01-22 RX ADMIN — AMIODARONE HYDROCHLORIDE 400 MG: 200 TABLET ORAL at 08:34

## 2025-01-22 RX ADMIN — CARVEDILOL 25 MG: 12.5 TABLET, FILM COATED ORAL at 08:33

## 2025-01-22 RX ADMIN — ATORVASTATIN CALCIUM 40 MG: 40 TABLET, FILM COATED ORAL at 22:51

## 2025-01-22 RX ADMIN — Medication 3 MG: at 22:51

## 2025-01-22 RX ADMIN — APIXABAN 5 MG: 5 TABLET, FILM COATED ORAL at 08:34

## 2025-01-22 RX ADMIN — METOPROLOL TARTRATE 5 MG: 5 INJECTION INTRAVENOUS at 22:52

## 2025-01-22 RX ADMIN — CARVEDILOL 25 MG: 12.5 TABLET, FILM COATED ORAL at 17:44

## 2025-01-22 ASSESSMENT — ENCOUNTER SYMPTOMS
COUGH: 0
BACK PAIN: 0
NAUSEA: 0
WHEEZING: 0
SHORTNESS OF BREATH: 0
ABDOMINAL PAIN: 0
VOMITING: 0

## 2025-01-22 ASSESSMENT — PAIN SCALES - GENERAL: PAINLEVEL_OUTOF10: 0

## 2025-01-22 NOTE — PLAN OF CARE
Problem: Chronic Conditions and Co-morbidities  Goal: Patient's chronic conditions and co-morbidity symptoms are monitored and maintained or improved  1/21/2025 2111 by eKlly Boothe RN  Outcome: Progressing  1/21/2025 1006 by Ana Irby RN  Outcome: Progressing     Problem: Discharge Planning  Goal: Discharge to home or other facility with appropriate resources  1/21/2025 2111 by Kelly Boothe RN  Outcome: Progressing  1/21/2025 1006 by Ana Iryb RN  Outcome: Progressing     Problem: Pain  Goal: Verbalizes/displays adequate comfort level or baseline comfort level  1/21/2025 2111 by Kelly Boothe RN  Outcome: Progressing  1/21/2025 1006 by Ana Irby RN  Outcome: Progressing     Problem: ABCDS Injury Assessment  Goal: Absence of physical injury  1/21/2025 2111 by Kelly Boothe RN  Outcome: Progressing  1/21/2025 1006 by Ana Irby RN  Outcome: Progressing

## 2025-01-23 LAB
ANION GAP SERPL CALC-SCNC: 6 MMOL/L (ref 2–12)
BUN SERPL-MCNC: 16 MG/DL (ref 6–20)
BUN/CREAT SERPL: 13 (ref 12–20)
CA-I BLD-MCNC: 8.6 MG/DL (ref 8.5–10.1)
CHLORIDE SERPL-SCNC: 108 MMOL/L (ref 97–108)
CO2 SERPL-SCNC: 26 MMOL/L (ref 21–32)
CREAT SERPL-MCNC: 1.27 MG/DL (ref 0.7–1.3)
GLUCOSE SERPL-MCNC: 126 MG/DL (ref 65–100)
POTASSIUM SERPL-SCNC: 3.6 MMOL/L (ref 3.5–5.1)
SODIUM SERPL-SCNC: 140 MMOL/L (ref 136–145)

## 2025-01-23 PROCEDURE — 2500000003 HC RX 250 WO HCPCS: Performed by: PHYSICIAN ASSISTANT

## 2025-01-23 PROCEDURE — 6370000000 HC RX 637 (ALT 250 FOR IP): Performed by: PHYSICIAN ASSISTANT

## 2025-01-23 PROCEDURE — 80048 BASIC METABOLIC PNL TOTAL CA: CPT

## 2025-01-23 PROCEDURE — 6370000000 HC RX 637 (ALT 250 FOR IP): Performed by: INTERNAL MEDICINE

## 2025-01-23 PROCEDURE — 2500000003 HC RX 250 WO HCPCS: Performed by: INTERNAL MEDICINE

## 2025-01-23 PROCEDURE — 2060000000 HC ICU INTERMEDIATE R&B

## 2025-01-23 PROCEDURE — 36415 COLL VENOUS BLD VENIPUNCTURE: CPT

## 2025-01-23 RX ORDER — DILTIAZEM HYDROCHLORIDE 30 MG/1
30 TABLET, FILM COATED ORAL EVERY 12 HOURS SCHEDULED
Status: DISCONTINUED | OUTPATIENT
Start: 2025-01-23 | End: 2025-01-24 | Stop reason: HOSPADM

## 2025-01-23 RX ADMIN — APIXABAN 5 MG: 5 TABLET, FILM COATED ORAL at 20:55

## 2025-01-23 RX ADMIN — Medication 3 MG: at 20:55

## 2025-01-23 RX ADMIN — CARVEDILOL 25 MG: 12.5 TABLET, FILM COATED ORAL at 17:04

## 2025-01-23 RX ADMIN — GUAIFENESIN 200 MG: 200 SOLUTION ORAL at 20:55

## 2025-01-23 RX ADMIN — SODIUM CHLORIDE, PRESERVATIVE FREE 5 ML: 5 INJECTION INTRAVENOUS at 20:55

## 2025-01-23 RX ADMIN — DILTIAZEM HYDROCHLORIDE 30 MG: 30 TABLET, FILM COATED ORAL at 20:55

## 2025-01-23 RX ADMIN — AMIODARONE HYDROCHLORIDE 150 MG: 1.5 INJECTION, SOLUTION INTRAVENOUS at 03:04

## 2025-01-23 RX ADMIN — ASPIRIN 325 MG: 325 TABLET ORAL at 09:14

## 2025-01-23 RX ADMIN — AMIODARONE HYDROCHLORIDE 400 MG: 200 TABLET ORAL at 09:14

## 2025-01-23 RX ADMIN — DILTIAZEM HYDROCHLORIDE 30 MG: 30 TABLET, FILM COATED ORAL at 09:14

## 2025-01-23 RX ADMIN — SODIUM CHLORIDE, PRESERVATIVE FREE 10 ML: 5 INJECTION INTRAVENOUS at 09:14

## 2025-01-23 RX ADMIN — CARVEDILOL 25 MG: 12.5 TABLET, FILM COATED ORAL at 09:14

## 2025-01-23 RX ADMIN — ATORVASTATIN CALCIUM 40 MG: 40 TABLET, FILM COATED ORAL at 20:55

## 2025-01-23 RX ADMIN — APIXABAN 5 MG: 5 TABLET, FILM COATED ORAL at 09:14

## 2025-01-23 ASSESSMENT — ENCOUNTER SYMPTOMS
VOMITING: 0
WHEEZING: 0
SHORTNESS OF BREATH: 0
BACK PAIN: 0
NAUSEA: 0
COUGH: 0
ABDOMINAL PAIN: 0

## 2025-01-23 NOTE — PROGRESS NOTES
Progress Note      1/22/2025 12:07 PM  NAME: Jean Paul Moyer   MRN:  [id number]   Admit Diagnosis: Pleural effusion [J90]  Acute chest pain [R07.9]  Atrial fibrillation with RVR (HCC) [I48.91]  Dyspnea, unspecified type [R06.00]  Chest pain, unspecified type [R07.9]  Congestive heart failure, unspecified HF chronicity, unspecified heart failure type (HCC) [I50.9]      Problem List:   A-fib with intermittent RVR  COPD  History of tachycardia induced cardiomyopathy  A-fib ablation 2022  Old CVA  Peripheral neuropathy  Tobacco addiction     Assessment/Plan:   Continue Eliquis  Aim for rate control of A-fib  Home on amiodarone, Eliquis, Coreg  Outpatient follow-up with cardiology for repeat ablation vs.  cardioversion         []       High complexity decision making was performed in this patient at high risk for decompensation with multiple organ involvement.    Subjective:     Jean Paul Moyer with intermittent dyspnea.  Discussed with RN events overnight.     Review of Systems:   Negative except for as noted above.    Objective:      Physical Exam:    Last 24hrs VS reviewed since prior progress note. Most recent are:    /75   Pulse (!) 110   Temp 98.2 °F (36.8 °C) (Oral)   Resp 18   Ht 1.778 m (5' 10\")   Wt 81.1 kg (178 lb 14.4 oz)   SpO2 96%   BMI 25.67 kg/m²     Intake/Output Summary (Last 24 hours) at 1/22/2025 1207  Last data filed at 1/21/2025 2328  Gross per 24 hour   Intake 300 ml   Output 300 ml   Net 0 ml        General Appearance: Alert; no acute distress.  Ears/Nose/Mouth/Throat: moist mucous membranes  Neck: Supple.  Chest: Lungs with scattered wheezes  Cardiovascular: Regular rate and rhythm, S1S2 normal  Abdomen: Soft, non-tender, bowel sounds are active.  Extremities: No edema bilaterally.  Skin: Warm and dry.      PMH/SH reviewed - no change compared to H&P    Telemetry: A-fib with heart rate in the 60s and 70s    EKG:     No valid procedures specified.    No results found for this 
        Progress Note      1/23/2025 11:18 AM  NAME: Jean Paul Moyer   MRN:  [id number]   Admit Diagnosis: Pleural effusion [J90]  Acute chest pain [R07.9]  Atrial fibrillation with RVR (HCC) [I48.91]  Dyspnea, unspecified type [R06.00]  Chest pain, unspecified type [R07.9]  Congestive heart failure, unspecified HF chronicity, unspecified heart failure type (HCC) [I50.9]      Problem List:   A-fib with intermittent RVR  COPD  History of tachycardia induced cardiomyopathy  A-fib ablation 2022  Old CVA  Peripheral neuropathy  Tobacco addiction     Assessment/Plan:   Continue Eliquis  Home on amiodarone, Eliquis, Coreg, and diltiazem  Outpatient follow-up with cardiology for repeat ablation vs. cardioversion         []       High complexity decision making was performed in this patient at high risk for decompensation with multiple organ involvement.    Subjective:     Jean Paul Moyer with intermittent dyspnea.  Discussed with RN events overnight.     Review of Systems:   Negative except for as noted above.    Objective:      Physical Exam:    Last 24hrs VS reviewed since prior progress note. Most recent are:    /72   Pulse (!) 101   Temp 97.9 °F (36.6 °C) (Oral)   Resp 18   Ht 1.778 m (5' 10\")   Wt 81.1 kg (178 lb 14.4 oz)   SpO2 91%   BMI 25.67 kg/m²     Intake/Output Summary (Last 24 hours) at 1/23/2025 1118  Last data filed at 1/23/2025 0544  Gross per 24 hour   Intake 485 ml   Output --   Net 485 ml        General Appearance: Alert; no acute distress.  Ears/Nose/Mouth/Throat: moist mucous membranes  Neck: Supple.  Chest: Lungs with scattered wheezes  Cardiovascular: Regular rate and rhythm, S1S2 normal  Abdomen: Soft, non-tender, bowel sounds are active.  Extremities: No edema bilaterally.  Skin: Warm and dry.      PMH/SH reviewed - no change compared to H&P    Telemetry: A-fib with heart rate in the 60s and 70s    EKG:     No valid procedures specified.    No results found for this or any previous 
      Hospitalist Progress Note    NAME:   Jean Paul Moyer   : 1960   MRN: [id number]     Date/Time: 2025 12:29 PM  Patient PCP: Odilon Mahan MD    Estimated discharge date: 24-48 hrs  Barriers: Clinical improvement, rate control      Assessment / Plan:  Atrial fibrillation  - S/p amiodarone infusion and Lopressor injection without improvement in rate, possibly will need oral beta-blocker  - Now weaned off diltiazem infusion, started on Coreg 25 mg twice daily and amiodarone 40 mg with HR varying, continue to monitor  - Appreciate cardiology assistance  - Heparin infusion discontinued, now on Eliquis 5 mg twice daily  -- Cardiology following, appreciate recs, recommended outpatient follow-up for repeat ablation or cardioversion    Acute chest pain, resolved  - Heart score 5  - Troponin is normal x 2  - Cardiology following, appreciate recs  - Echocardiogram with EF 50 to 55%, reduced systolic function, thickened cusp of aortic valve, mild to moderate regurg of mitral and tricuspid valve    History of HFrEF  - Echo in  with EF 25 to 30%  - Repeat echo significantly improved at 50 to 55%  - Continue GDMT as able    Tobacco use  - Encourage cessation  - Nicotine patch as needed    Subclinical hypothyroidism  - TSH elevated however T4 and T3 WNL    Code Status: Full  DVT Prophylaxis: Heparin  GI Prophylaxis: Not indicated    --------------------------------------------------------------------  [x] High (any 2)    A. Problems (any 1)  [x] Acute/Chronic Illness/injury posing threat to life or bodily function: A-fib with RVR  [] Severe exacerbation of chronic illness:    ---------------------------------------------------------------------  B. Risk of Treatment (any 1)   [] Drugs/treatments that require intensive monitoring for toxicity include:    [] IV ABX requiring serial renal monitoring for nephrotoxicity:     [] IV Narcotic analgesia for adverse drug reaction  [] Aggressive IV diuresis requiring 
      Hospitalist Progress Note    NAME:   Jean Paul Moyer   : 1960   MRN: [id number]     Date/Time: 2025 9:38 AM  Patient PCP: Odilon Mahan MD    Estimated discharge date: 24-48 hrs  Barriers: Clinical improvement, rate control      Assessment / Plan:  Atrial fibrillation  - S/p amiodarone infusion and Lopressor injection without improvement in rate, possibly will need oral beta-blocker  - Now weaned off diltiazem infusion, started on Coreg 25 mg twice daily and amiodarone 40 mg with HR varying, continue to monitor  - Appreciate cardiology assistance  - Heparin infusion discontinued, now on Eliquis 5 mg twice daily  -- Cardiology following, appreciate recs, recommended outpatient follow-up for repeat ablation or cardioversion  -- Patient still intermittently tachycardic, initiate diltiazem 30 mg BID    Acute chest pain, resolved  - Heart score 5  - Troponin is normal x 2  - Cardiology following, appreciate recs  - Echocardiogram with EF 50 to 55%, reduced systolic function, thickened cusp of aortic valve, mild to moderate regurg of mitral and tricuspid valve    History of HFrEF  - Echo in  with EF 25 to 30%  - Repeat echo significantly improved at 50 to 55%  - Continue GDMT as able    Tobacco use  - Encourage cessation  - Nicotine patch as needed    Subclinical hypothyroidism  - TSH elevated however T4 and T3 WNL    Code Status: Full  DVT Prophylaxis: Heparin  GI Prophylaxis: Not indicated    --------------------------------------------------------------------  [x] High (any 2)    A. Problems (any 1)  [x] Acute/Chronic Illness/injury posing threat to life or bodily function: A-fib with RVR  [] Severe exacerbation of chronic illness:    ---------------------------------------------------------------------  B. Risk of Treatment (any 1)   [] Drugs/treatments that require intensive monitoring for toxicity include:    [] IV ABX requiring serial renal monitoring for nephrotoxicity:     [] IV 
      Hospitalist Progress Note    NAME:   Jean Paul Moyer   : 1960   MRN: [id number]     Date/Time: 2025 11:03 AM  Patient PCP: Odilon Mahan MD    Estimated discharge date:48 hrs  Barriers: Clinical improvement, conversion out of RVR      Assessment / Plan:  A-fib with RVR  - Continue oral amiodarone  - S/p amiodarone infusion and Lopressor injection without improvement in rate, possibly will need oral beta-blocker  - Currently on diltiazem infusion  - Discussed with cardiology, increase diltiazem rate to 7.5 mg an hour, give additional digoxin 0.25 mL dose and one-time diltiazem injection 20 mg, will give 10 mg given softer blood pressures  - Appreciate cardiology assistance  - Continue heparin infusion    Acute chest pain  - Heart score 5  - Troponin is normal x 2  - Cardiology consult  - Echocardiogram pending    History of HFrEF  - Echo in  with EF 25 to 30%  - Repeat echo pending  - Initiate GDMT as able    Tobacco use  - Encourage cessation  - Nicotine patch as needed    Subclinical hypothyroidism  - TSH elevated however T4 and T3 WNL    Code Status: Full  DVT Prophylaxis: Heparin  GI Prophylaxis: Not indicated    --------------------------------------------------------------------  [x] High (any 2)    A. Problems (any 1)  [x] Acute/Chronic Illness/injury posing threat to life or bodily function: A-fib with RVR  [] Severe exacerbation of chronic illness:    ---------------------------------------------------------------------  B. Risk of Treatment (any 1)   [] Drugs/treatments that require intensive monitoring for toxicity include:    [] IV ABX requiring serial renal monitoring for nephrotoxicity:     [] IV Narcotic analgesia for adverse drug reaction  [] Aggressive IV diuresis requiring serial monitoring for renal impairment and electrolyte derangements  [] Critical electrolyte abnormalities requiring IV replacement and close serial monitoring  [] Insulin - monitoring serial FSBS for 
4 Eyes Skin Assessment     NAME:  Jean Paul Moyer  YOB: 1960  MEDICAL RECORD NUMBER:  [id number]    The patient is being assessed for  Admission    I agree that at least one RN has performed a thorough Head to Toe Skin Assessment on the patient. ALL assessment sites listed below have been assessed.      Areas assessed by both nurses:    Head, Face, Ears, Shoulders, Back, Chest, Arms, Elbows, Hands, Sacrum. Buttock, Coccyx, Ischium, Legs. Feet and Heels, and Under Medical Devices         Does the Patient have a Wound? No noted wound(s)     Scattered scab/scars on body  Edmund Prevention initiated by RN: No  Wound Care Orders initiated by RN: No    Pressure Injury (Stage 3,4, Unstageable, DTI, NWPT, and Complex wounds) if present, place Wound referral order by RN under : No    New Ostomies, if present place, Ostomy referral order under : No     Nurse 1 eSignature: Electronically signed by Trey kellogg RN on 1/19/25 at 2:47 AM EST    **SHARE this note so that the co-signing nurse can place an eSignature**    Nurse 2 eSignature: Electronically signed by Kelly Boothe RN on 1/19/25 at 5:03 AM EST   
4 Eyes Skin Assessment     NAME:  Jean Paul Moyer  YOB: 1960  MEDICAL RECORD NUMBER:  [id number]    The patient is being assessed for  Other weekly wound assessment    I agree that at least one RN has performed a thorough Head to Toe Skin Assessment on the patient. ALL assessment sites listed below have been assessed.      Areas assessed by both nurses:    Head, Face, Ears, Shoulders, Back, Chest, Arms, Elbows, Hands, Sacrum. Buttock, Coccyx, Ischium, Legs. Feet and Heels, Under Medical Devices , and Other          Does the Patient have a Wound? No noted wound(s)       Edmund Prevention initiated by RN: No  Wound Care Orders initiated by RN: No    Pressure Injury (Stage 3,4, Unstageable, DTI, NWPT, and Complex wounds) if present, place Wound referral order by RN under : No    New Ostomies, if present place, Ostomy referral order under : No     Nurse 1 eSignature: Electronically signed by Narcisa Carmona RN on 1/22/25 at 3:47 PM EST    **SHARE this note so that the co-signing nurse can place an eSignature**    Nurse 2 eSignature: Electronically signed by Nila Saenz RN on 1/22/25 at 4:02 PM EST    
According to Nursing staff; patient states he has not been taking apixaban due to cost barriers. Dose in chart was not administered. Will proceed to switch lab monitoring back to anti-XA at this time.   
Diltiazen infusion was infiltrating, called to pharmacy, not vesicant medication, because of that it is just necessary monitor the area, it does not need any antidote.   
Heparin Infusion Initiation  Jean Paul Moyer is a 64 y.o. male starting heparin for:  atrial fibrillation  Heparin dosing: order for weight based protocol  Initial Dosing Weight: 86.8 kg (Recorded body weight)  Recent Labs     01/18/25  1730   *   HGB 14.5     Factor Xa inhibitor/LMWH use within the past 72 hours? Yes  If yes, date and time of last administration:  1/19/2025 @0040  Hypertriglyceridemia (> 690 mg/dL) or hyperbilirubinemia (> 37 mg/dL conjugated bilirubin, >14 mg/dL unconjugated bilirubin) present? No  No results for input(s): \"BILITOT\" in the last 72 hours.    Invalid input(s): \"TRIGL\"    Assessment/Plan:   Heparin to be monitored using aPTT until 72 hours following last factor Xa inhibitor/LMWH administration, anticipated date for change to anti-Xa monitoring is 1/22/25 @0200  Initial bolus ordered: Yes  Starting rate:  11 unit/kg/hr  PRN boluses entered: Yes       
Night shift nurse reported during end of  shift beside report that pt's HR  has been allover the place and MD was made aware . Tele Room contacted  writer at 07:48 making  me aware of the HR rate 157 made them aware of the information described above and am cardiac medications haven't administered yet as well as on assessment pt denies having any cardiac related symptoms . Then tele room at 08:28 overhead called a Rapid Response for HR . Staff and MD in the room to evaluate pt  he still remains asymptomatic Md instructed to give am cardiac medications and continue to monitor closely and report any changes in condition.  
Patient BP below 100 systolic. Order received to decrease dilt to 5 and add bolus 500 NS fluid  
Patient is complaining of lightheadedness upon ambulating and SOB. Provider on call , TIERRA Bowden notified. Order received for CXR, Oxygen therapy.   
Received Order for Telemetry     Jean Paul Moyer   1960   [id number]   Pleural effusion [J90]  Acute chest pain [R07.9]  Atrial fibrillation with RVR (HCC) [I48.91]  Dyspnea, unspecified type [R06.00]  Chest pain, unspecified type [R07.9]  Congestive heart failure, unspecified HF chronicity, unspecified heart failure type (HCC) [I50.9]   No att. providers found     Tele Box # [id number] placed on patient at  2223 pm  ER Room # FSED  Admitting to Room 470  Verified with Primary Nurse DANIELLA at  2223 pm    
Spiritual Health History and Assessment/Progress Note  [facility]    Spiritual/Emotional Needs,  ,  ,      Name: Jean Paul Moyer MRN: [id number]    Age: 64 y.o.     Sex: male   Language: English   Worship: None   Atrial fibrillation with RVR (HCC)     Date: 1/19/2025            Total Time Calculated: 8 min              Spiritual Assessment began in [facility]        Referral/Consult From: Nurse   Encounter Overview/Reason: Spiritual/Emotional Needs  Service Provided For: Significant other    Aidee, Belief, Meaning:   Patient unable to assess at this time  Family/Friends identify as spiritual      Importance and Influence:  Patient unable to assess at this time  Family/Friends have spiritual/personal beliefs that influence decisions regarding the patient's health    Community:  Patient Other: unable to assess  Family/Friends feel well-supported. Support system includes: Spouse/Partner    Assessment and Plan of Care:     Patient Interventions include: Other: unable to assess  Family/Friends Interventions include: Facilitated expression of thoughts and feelings, Explored spiritual coping/struggle/distress, and Affirmed coping skills/support systems    Patient Plan of Care: Spiritual Care available upon further referral  Family/Friends Plan of Care: Spiritual Care available upon further referral     responded to request by patient's partner. Per patient's partner, she as under the impression chaplains aided in meal vouchers in the hospital.  informed her this was not something chaplains do in the hospital. She was accepting of this. Partner shared about the patient's health concerns and notes her intention to stay with him in the hospital.  provided a supportive presence and prayer as requested. Follow up with patient's RN to discuss patient's care.     Electronically signed by ODETTE ARANA on 1/19/2025 at 3:54 PM    
Continue current course.     Subjective:  Patient seen in room in no acute distress. States he is feeling better today. Currently waiting to get an ECHO done today. No new complaints at this time. Following with cardiology.   Objective:     VITALS:   Last 24hrs VS reviewed since prior progress note. Most recent are:  Patient Vitals for the past 24 hrs:   BP Temp Temp src Pulse Resp SpO2 Weight   01/20/25 0806 116/74 97.5 °F (36.4 °C) Oral (!) 144 18 93 % --   01/20/25 0703 -- -- -- (!) 146 -- -- --   01/20/25 0545 -- -- -- -- -- -- 81.1 kg (178 lb 14.4 oz)   01/20/25 0403 101/79 98.1 °F (36.7 °C) -- 52 19 93 % --   01/20/25 0002 -- -- -- (!) 136 -- -- --   01/20/25 0002 117/72 97.5 °F (36.4 °C) -- (!) 120 19 98 % --   01/19/25 2155 125/76 -- -- 74 -- -- --   01/19/25 1948 96/69 97.9 °F (36.6 °C) Oral 93 17 90 % --   01/19/25 1745 -- 97.5 °F (36.4 °C) Oral -- -- -- --   01/19/25 1537 -- -- -- (!) 128 -- -- --   01/19/25 1513 109/63 -- Oral (!) 148 20 95 % --   01/19/25 1215 113/72 -- -- 95 -- -- --   01/19/25 1201 (!) 111/92 -- -- (!) 118 -- 97 % --         Intake/Output Summary (Last 24 hours) at 1/20/2025 1132  Last data filed at 1/20/2025 1110  Gross per 24 hour   Intake 200 ml   Output 250 ml   Net -50 ml        I had a face to face encounter and independently examined this patient on 1/20/2025, as outlined below:    Review of Systems   Constitutional:  Negative for appetite change, fatigue and fever.   Respiratory:  Negative for cough, shortness of breath and wheezing.    Cardiovascular:  Positive for palpitations. Negative for chest pain and leg swelling.   Gastrointestinal:  Negative for abdominal pain, nausea and vomiting.   Musculoskeletal:  Negative for back pain and myalgias.   Skin:  Negative for rash and wound.   Neurological:  Negative for dizziness and light-headedness.   Psychiatric/Behavioral:  Negative for confusion and dysphoric mood.         PHYSICAL EXAM:  Physical Exam  Constitutional:       
Or    potassium chloride 10 mEq/100 mL IVPB (Peripheral Line)  10 mEq IntraVENous PRN    magnesium sulfate 2000 mg in 50 mL IVPB premix  2,000 mg IntraVENous PRN    ondansetron (ZOFRAN-ODT) disintegrating tablet 4 mg  4 mg Oral Q8H PRN    Or    ondansetron (ZOFRAN) injection 4 mg  4 mg IntraVENous Q6H PRN    polyethylene glycol (GLYCOLAX) packet 17 g  17 g Oral Daily PRN    acetaminophen (TYLENOL) tablet 650 mg  650 mg Oral Q6H PRN    Or    acetaminophen (TYLENOL) suppository 650 mg  650 mg Rectal Q6H PRN    melatonin tablet 3 mg  3 mg Oral Nightly         Catie Bernal MD     
input(s): \"SGOT\", \"GPT\", \"AP\", \"TBIL\", \"ALB\", \"AML\", \"AMYP\", \"LPSE\", \"HLPSE\"  No results for input(s): \"PH\", \"PCO2\", \"PO2\" in the last 72 hours.    Medications Personally Reviewed:    Current Facility-Administered Medications   Medication Dose Route Frequency    amiodarone (CORDARONE) tablet 400 mg  400 mg Oral TID    dilTIAZem injection 10 mg  10 mg IntraVENous Once    heparin 25,000 units in dextrose 5% 250 mL (premix) infusion  5-30 Units/kg/hr IntraVENous Continuous    dilTIAZem 125 mg in sodium chloride 0.9 % 125 mL infusion ([id number])  2.5-15 mg/hr IntraVENous Continuous    midodrine (PROAMATINE) tablet 10 mg  10 mg Oral TID PRN    heparin (porcine) injection 2,000 Units  2,000 Units IntraVENous PRN    heparin (porcine) injection 4,000 Units  4,000 Units IntraVENous PRN    carvedilol (COREG) tablet 12.5 mg  12.5 mg Oral BID WC    aspirin tablet 325 mg  325 mg Oral Daily    atorvastatin (LIPITOR) tablet 40 mg  40 mg Oral Nightly    sodium chloride flush 0.9 % injection 5-40 mL  5-40 mL IntraVENous 2 times per day    sodium chloride flush 0.9 % injection 5-40 mL  5-40 mL IntraVENous PRN    0.9 % sodium chloride infusion   IntraVENous PRN    potassium chloride (KLOR-CON M) extended release tablet 40 mEq  40 mEq Oral PRN    Or    potassium bicarb-citric acid (EFFER-K) effervescent tablet 40 mEq  40 mEq Oral PRN    Or    potassium chloride 10 mEq/100 mL IVPB (Peripheral Line)  10 mEq IntraVENous PRN    magnesium sulfate 2000 mg in 50 mL IVPB premix  2,000 mg IntraVENous PRN    ondansetron (ZOFRAN-ODT) disintegrating tablet 4 mg  4 mg Oral Q8H PRN    Or    ondansetron (ZOFRAN) injection 4 mg  4 mg IntraVENous Q6H PRN    polyethylene glycol (GLYCOLAX) packet 17 g  17 g Oral Daily PRN    acetaminophen (TYLENOL) tablet 650 mg  650 mg Oral Q6H PRN    Or    acetaminophen (TYLENOL) suppository 650 mg  650 mg Rectal Q6H PRN    melatonin tablet 3 mg  3 mg Oral Nightly         Prior to Admission medications    Medication Sig 
light-headedness.   Psychiatric/Behavioral:  Negative for confusion and dysphoric mood.         PHYSICAL EXAM:  Physical Exam  Constitutional:       General: He is not in acute distress.     Appearance: Normal appearance. He is not ill-appearing.   Cardiovascular:      Rate and Rhythm: Normal rate and regular rhythm.   Pulmonary:      Effort: Pulmonary effort is normal. No respiratory distress.      Breath sounds: Normal breath sounds. No wheezing.   Abdominal:      General: Abdomen is flat. There is no distension.      Palpations: Abdomen is soft.   Musculoskeletal:         General: No swelling or tenderness. Normal range of motion.   Skin:     General: Skin is warm and dry.   Neurological:      Mental Status: He is alert and oriented to person, place, and time. Mental status is at baseline.   Psychiatric:         Mood and Affect: Mood normal.         Behavior: Behavior normal.          Reviewed most current lab test results and cultures  YES  Reviewed most current radiology test results   YES  Review and summation of old records today    NO  Reviewed patient's current orders and MAR    YES  PMH/ reviewed - no change compared to H&P  ________________________________________________________________________  Total NON critical care TIME:  35  Minutes    Total CRITICAL CARE TIME Spent:   Minutes non procedure based      Comments   >50% of visit spent in counseling and coordination of care     ________________________________________________________________________  Surinder Sweeney     Procedures: see electronic medical records for all procedures/Xrays and details which were not copied into this note but were reviewed prior to creation of Plan.      LABS:  I reviewed today's most current labs and imaging studies.  Pertinent labs include:  Recent Labs     01/18/25 1730 01/19/25  1008 01/21/25  0350   WBC 10.6 10.2 10.2   HGB 14.5 14.4 12.7   HCT 45.2 44.7 39.5   * 419* 355     Recent Labs     01/18/25 1730

## 2025-01-23 NOTE — PLAN OF CARE
Problem: Chronic Conditions and Co-morbidities  Goal: Patient's chronic conditions and co-morbidity symptoms are monitored and maintained or improved  1/23/2025 0000 by Larry Elizabeth RN  Outcome: Progressing  1/22/2025 1349 by Narcisa Carmona RN  Outcome: Progressing     Problem: Discharge Planning  Goal: Discharge to home or other facility with appropriate resources  1/23/2025 0000 by Larry Elizabeth RN  Outcome: Progressing  1/22/2025 1349 by Narcisa Carmona RN  Outcome: Progressing     Problem: Pain  Goal: Verbalizes/displays adequate comfort level or baseline comfort level  1/23/2025 0000 by Larry Elizabeth RN  Outcome: Progressing  1/22/2025 1349 by Narcisa Carmona RN  Outcome: Progressing     Problem: ABCDS Injury Assessment  Goal: Absence of physical injury  1/23/2025 0000 by Larry Elizabeth RN  Outcome: Progressing  1/22/2025 1349 by Narcisa Carmona RN  Outcome: Progressing

## 2025-01-23 NOTE — PLAN OF CARE
Problem: Chronic Conditions and Co-morbidities  Goal: Patient's chronic conditions and co-morbidity symptoms are monitored and maintained or improved  1/23/2025 1148 by Lorena Mauricio RN  Outcome: Progressing  1/23/2025 0000 by Larry Elizabeth RN  Outcome: Progressing  Flowsheets (Taken 1/22/2025 2300)  Care Plan - Patient's Chronic Conditions and Co-Morbidity Symptoms are Monitored and Maintained or Improved: Monitor and assess patient's chronic conditions and comorbid symptoms for stability, deterioration, or improvement     Problem: Discharge Planning  Goal: Discharge to home or other facility with appropriate resources  1/23/2025 0000 by Larry Elizabeth RN  Outcome: Progressing  Flowsheets (Taken 1/22/2025 2300)  Discharge to home or other facility with appropriate resources: Identify barriers to discharge with patient and caregiver     Problem: Pain  Goal: Verbalizes/displays adequate comfort level or baseline comfort level  1/23/2025 1148 by Lorena Mauricio RN  Outcome: Progressing  1/23/2025 0000 by Larry Elizabeth RN  Outcome: Progressing     Problem: ABCDS Injury Assessment  Goal: Absence of physical injury  1/23/2025 1148 by Lorena Mauricio RN  Outcome: Progressing  1/23/2025 0000 by Larry Elizabeth RN  Outcome: Progressing

## 2025-01-24 VITALS
BODY MASS INDEX: 25.61 KG/M2 | HEART RATE: 77 BPM | RESPIRATION RATE: 18 BRPM | WEIGHT: 178.9 LBS | OXYGEN SATURATION: 96 % | HEIGHT: 70 IN | DIASTOLIC BLOOD PRESSURE: 76 MMHG | TEMPERATURE: 98.1 F | SYSTOLIC BLOOD PRESSURE: 120 MMHG

## 2025-01-24 PROBLEM — E03.8 SUBCLINICAL HYPOTHYROIDISM: Status: ACTIVE | Noted: 2025-01-24

## 2025-01-24 PROBLEM — I48.91 ATRIAL FIBRILLATION WITH RVR (HCC): Status: RESOLVED | Noted: 2020-11-02 | Resolved: 2025-01-24

## 2025-01-24 PROBLEM — I50.30 (HFPEF) HEART FAILURE WITH PRESERVED EJECTION FRACTION (HCC): Status: ACTIVE | Noted: 2025-01-24

## 2025-01-24 PROBLEM — R07.9 ACUTE CHEST PAIN: Status: RESOLVED | Noted: 2025-01-18 | Resolved: 2025-01-24

## 2025-01-24 LAB
ANION GAP SERPL CALC-SCNC: 1 MMOL/L (ref 2–12)
BUN SERPL-MCNC: 14 MG/DL (ref 6–20)
BUN/CREAT SERPL: 12 (ref 12–20)
CA-I BLD-MCNC: 9.3 MG/DL (ref 8.5–10.1)
CHLORIDE SERPL-SCNC: 106 MMOL/L (ref 97–108)
CO2 SERPL-SCNC: 29 MMOL/L (ref 21–32)
CREAT SERPL-MCNC: 1.21 MG/DL (ref 0.7–1.3)
GLUCOSE SERPL-MCNC: 137 MG/DL (ref 65–100)
POTASSIUM SERPL-SCNC: 4.4 MMOL/L (ref 3.5–5.1)
SODIUM SERPL-SCNC: 136 MMOL/L (ref 136–145)

## 2025-01-24 PROCEDURE — 80048 BASIC METABOLIC PNL TOTAL CA: CPT

## 2025-01-24 PROCEDURE — 6370000000 HC RX 637 (ALT 250 FOR IP): Performed by: INTERNAL MEDICINE

## 2025-01-24 PROCEDURE — 6370000000 HC RX 637 (ALT 250 FOR IP): Performed by: PHYSICIAN ASSISTANT

## 2025-01-24 PROCEDURE — 2500000003 HC RX 250 WO HCPCS: Performed by: INTERNAL MEDICINE

## 2025-01-24 PROCEDURE — 36415 COLL VENOUS BLD VENIPUNCTURE: CPT

## 2025-01-24 RX ORDER — DILTIAZEM HYDROCHLORIDE 30 MG/1
30 TABLET, FILM COATED ORAL EVERY 12 HOURS SCHEDULED
Qty: 60 TABLET | Refills: 0 | Status: SHIPPED | OUTPATIENT
Start: 2025-01-24 | End: 2025-02-23

## 2025-01-24 RX ORDER — CARVEDILOL 25 MG/1
25 TABLET ORAL 2 TIMES DAILY WITH MEALS
Qty: 60 TABLET | Refills: 0 | Status: SHIPPED | OUTPATIENT
Start: 2025-01-24 | End: 2025-02-23

## 2025-01-24 RX ORDER — AMIODARONE HYDROCHLORIDE 400 MG/1
400 TABLET ORAL DAILY
Qty: 30 TABLET | Refills: 0 | Status: SHIPPED | OUTPATIENT
Start: 2025-01-25 | End: 2025-02-24

## 2025-01-24 RX ADMIN — DILTIAZEM HYDROCHLORIDE 30 MG: 30 TABLET, FILM COATED ORAL at 09:01

## 2025-01-24 RX ADMIN — ASPIRIN 325 MG: 325 TABLET ORAL at 09:01

## 2025-01-24 RX ADMIN — SODIUM CHLORIDE, PRESERVATIVE FREE 10 ML: 5 INJECTION INTRAVENOUS at 09:01

## 2025-01-24 RX ADMIN — AMIODARONE HYDROCHLORIDE 400 MG: 200 TABLET ORAL at 09:01

## 2025-01-24 RX ADMIN — CARVEDILOL 25 MG: 12.5 TABLET, FILM COATED ORAL at 09:01

## 2025-01-24 RX ADMIN — APIXABAN 5 MG: 5 TABLET, FILM COATED ORAL at 09:01

## 2025-01-24 NOTE — PLAN OF CARE
Problem: Chronic Conditions and Co-morbidities  Goal: Patient's chronic conditions and co-morbidity symptoms are monitored and maintained or improved  1/23/2025 2247 by Larry Elizabeth RN  Outcome: Progressing  1/23/2025 1148 by Lorena Mauricio RN  Outcome: Progressing     Problem: Discharge Planning  Goal: Discharge to home or other facility with appropriate resources  Outcome: Progressing     Problem: Pain  Goal: Verbalizes/displays adequate comfort level or baseline comfort level  1/23/2025 2247 by Larry Elizabeth, RN  Outcome: Progressing  1/23/2025 1148 by Lorena Mauricio RN  Outcome: Progressing     Problem: ABCDS Injury Assessment  Goal: Absence of physical injury  1/23/2025 2247 by Larry Elizabeth RN  Outcome: Progressing  1/23/2025 1148 by Lorena Mauricio, RN  Outcome: Progressing

## 2025-01-24 NOTE — DISCHARGE SUMMARY
and cardiac diet  Recommended activity: activity as tolerated  Wound care: None      Follow up with:   PCP : Odilon Mahan MD [place], Kirby D, MD  [address]  [facility] [address] [phone]    Follow up in 1 week(s)  Patient or Family Member must call and schedule follow-up appointment.    [facility]  [address] [place] [id number] [phone]  Follow up  As needed, If symptoms worsen    Bhupendra Leggett MD  [address] [facility] [address] [phone]    Follow up on 2/10/2025  @3:30pm          Total time in minutes spent coordinating this discharge (includes going over instructions, follow-up, prescriptions, and preparing report for sign off to her PCP) :  35 minutes

## 2025-01-24 NOTE — CARE COORDINATION
Transition of Care Plan:    RUR: 10%  Prior Level of Functioning: independent  Disposition: home   ANAI:   If SNF or IPR: Date FOC offered:   Date FOC received:   Accepting facility:   Date authorization started with reference number:   Date authorization received and expires:   Follow up appointments:   DME needed:   Transportation at discharge: may need cab  IM/IMM Medicare/[place] letter given:   Is patient a  and connected with [facility]?    If yes, was  transfer form completed and [facility] notified?   Caregiver Contact:   Discharge Caregiver contacted prior to discharge? Patient aware  Care Conference needed?   Barriers to discharge: transport issues